# Patient Record
Sex: FEMALE | Race: WHITE | Employment: OTHER | URBAN - NONMETROPOLITAN AREA
[De-identification: names, ages, dates, MRNs, and addresses within clinical notes are randomized per-mention and may not be internally consistent; named-entity substitution may affect disease eponyms.]

---

## 2017-01-13 DIAGNOSIS — E03.9 ACQUIRED HYPOTHYROIDISM: ICD-10-CM

## 2017-01-13 RX ORDER — LEVOTHYROXINE SODIUM 112 UG/1
TABLET ORAL
Qty: 30 TABLET | Refills: 1 | Status: SHIPPED | OUTPATIENT
Start: 2017-01-13 | End: 2017-01-19 | Stop reason: DRUGHIGH

## 2017-01-19 DIAGNOSIS — E03.9 ACQUIRED HYPOTHYROIDISM: Primary | ICD-10-CM

## 2017-01-19 DIAGNOSIS — E03.9 HYPOTHYROIDISM, UNSPECIFIED TYPE: Primary | ICD-10-CM

## 2017-01-19 RX ORDER — LEVOTHYROXINE SODIUM 0.12 MG/1
125 TABLET ORAL DAILY
Qty: 30 TABLET | Refills: 3 | Status: SHIPPED | OUTPATIENT
Start: 2017-01-19 | End: 2017-05-13 | Stop reason: SDUPTHER

## 2017-01-23 ENCOUNTER — OFFICE VISIT (OUTPATIENT)
Dept: FAMILY MEDICINE CLINIC | Age: 67
End: 2017-01-23

## 2017-01-23 VITALS
HEIGHT: 61 IN | HEART RATE: 80 BPM | BODY MASS INDEX: 34.55 KG/M2 | SYSTOLIC BLOOD PRESSURE: 110 MMHG | WEIGHT: 183 LBS | DIASTOLIC BLOOD PRESSURE: 68 MMHG | RESPIRATION RATE: 18 BRPM

## 2017-01-23 DIAGNOSIS — I10 ESSENTIAL HYPERTENSION: ICD-10-CM

## 2017-01-23 DIAGNOSIS — R22.0 SWELLING OF BOTH LIPS: ICD-10-CM

## 2017-01-23 DIAGNOSIS — R73.01 IMPAIRED FASTING GLUCOSE: ICD-10-CM

## 2017-01-23 DIAGNOSIS — E78.00 PURE HYPERCHOLESTEROLEMIA: ICD-10-CM

## 2017-01-23 DIAGNOSIS — R05.9 COUGH: ICD-10-CM

## 2017-01-23 DIAGNOSIS — Z13.9 SCREENING: ICD-10-CM

## 2017-01-23 DIAGNOSIS — Z23 NEED FOR VACCINATION: Primary | ICD-10-CM

## 2017-01-23 DIAGNOSIS — J30.9 ALLERGIC RHINITIS, UNSPECIFIED ALLERGIC RHINITIS TRIGGER, UNSPECIFIED RHINITIS SEASONALITY: ICD-10-CM

## 2017-01-23 DIAGNOSIS — J01.41 ACUTE RECURRENT PANSINUSITIS: ICD-10-CM

## 2017-01-23 PROCEDURE — G8598 ASA/ANTIPLAT THER USED: HCPCS | Performed by: PHYSICIAN ASSISTANT

## 2017-01-23 PROCEDURE — 1090F PRES/ABSN URINE INCON ASSESS: CPT | Performed by: PHYSICIAN ASSISTANT

## 2017-01-23 PROCEDURE — G0008 ADMIN INFLUENZA VIRUS VAC: HCPCS | Performed by: PHYSICIAN ASSISTANT

## 2017-01-23 PROCEDURE — G8419 CALC BMI OUT NRM PARAM NOF/U: HCPCS | Performed by: PHYSICIAN ASSISTANT

## 2017-01-23 PROCEDURE — G8399 PT W/DXA RESULTS DOCUMENT: HCPCS | Performed by: PHYSICIAN ASSISTANT

## 2017-01-23 PROCEDURE — 1123F ACP DISCUSS/DSCN MKR DOCD: CPT | Performed by: PHYSICIAN ASSISTANT

## 2017-01-23 PROCEDURE — 3014F SCREEN MAMMO DOC REV: CPT | Performed by: PHYSICIAN ASSISTANT

## 2017-01-23 PROCEDURE — 90686 IIV4 VACC NO PRSV 0.5 ML IM: CPT | Performed by: PHYSICIAN ASSISTANT

## 2017-01-23 PROCEDURE — 4040F PNEUMOC VAC/ADMIN/RCVD: CPT | Performed by: PHYSICIAN ASSISTANT

## 2017-01-23 PROCEDURE — 3017F COLORECTAL CA SCREEN DOC REV: CPT | Performed by: PHYSICIAN ASSISTANT

## 2017-01-23 PROCEDURE — 99214 OFFICE O/P EST MOD 30 MIN: CPT | Performed by: PHYSICIAN ASSISTANT

## 2017-01-23 PROCEDURE — 1036F TOBACCO NON-USER: CPT | Performed by: PHYSICIAN ASSISTANT

## 2017-01-23 PROCEDURE — G8427 DOCREV CUR MEDS BY ELIG CLIN: HCPCS | Performed by: PHYSICIAN ASSISTANT

## 2017-01-23 PROCEDURE — G8484 FLU IMMUNIZE NO ADMIN: HCPCS | Performed by: PHYSICIAN ASSISTANT

## 2017-01-23 RX ORDER — AZITHROMYCIN 250 MG/1
TABLET, FILM COATED ORAL
Qty: 1 PACKET | Refills: 0 | Status: SHIPPED | OUTPATIENT
Start: 2017-01-23 | End: 2017-02-02

## 2017-01-23 ASSESSMENT — ENCOUNTER SYMPTOMS
SORE THROAT: 0
SINUS PRESSURE: 0
SHORTNESS OF BREATH: 0
RHINORRHEA: 1
CHEST TIGHTNESS: 0
NAUSEA: 0
WHEEZING: 0
DIARRHEA: 0
VOMITING: 0
COUGH: 1

## 2017-01-23 ASSESSMENT — PATIENT HEALTH QUESTIONNAIRE - PHQ9
SUM OF ALL RESPONSES TO PHQ9 QUESTIONS 1 & 2: 0
1. LITTLE INTEREST OR PLEASURE IN DOING THINGS: 0
SUM OF ALL RESPONSES TO PHQ QUESTIONS 1-9: 0
2. FEELING DOWN, DEPRESSED OR HOPELESS: 0

## 2017-01-25 DIAGNOSIS — Z23 NEED FOR VACCINATION: Primary | ICD-10-CM

## 2017-02-22 ENCOUNTER — HOSPITAL ENCOUNTER (OUTPATIENT)
Age: 67
Setting detail: SPECIMEN
Discharge: HOME | End: 2017-02-22
Payer: MEDICARE

## 2017-02-22 ENCOUNTER — OFFICE VISIT (OUTPATIENT)
Dept: ALLERGY | Age: 67
End: 2017-02-22

## 2017-02-22 VITALS
HEIGHT: 61 IN | WEIGHT: 184 LBS | BODY MASS INDEX: 34.74 KG/M2 | DIASTOLIC BLOOD PRESSURE: 72 MMHG | SYSTOLIC BLOOD PRESSURE: 132 MMHG | HEART RATE: 82 BPM

## 2017-02-22 DIAGNOSIS — R05.9 COUGH: Primary | ICD-10-CM

## 2017-02-22 DIAGNOSIS — R06.83 SNORING: ICD-10-CM

## 2017-02-22 DIAGNOSIS — K13.79 ELONGATED UVULA, ACQUIRED: ICD-10-CM

## 2017-02-22 DIAGNOSIS — R09.82 POST-NASAL DRIP: ICD-10-CM

## 2017-02-22 DIAGNOSIS — G47.8 UNREFRESHED BY SLEEP: ICD-10-CM

## 2017-02-22 DIAGNOSIS — G47.30 SLEEP APNEA, UNSPECIFIED TYPE: ICD-10-CM

## 2017-02-22 DIAGNOSIS — R09.89 THROAT CLEARING: ICD-10-CM

## 2017-02-22 PROCEDURE — 3017F COLORECTAL CA SCREEN DOC REV: CPT | Performed by: NURSE PRACTITIONER

## 2017-02-22 PROCEDURE — G8417 CALC BMI ABV UP PARAM F/U: HCPCS | Performed by: NURSE PRACTITIONER

## 2017-02-22 PROCEDURE — 1090F PRES/ABSN URINE INCON ASSESS: CPT | Performed by: NURSE PRACTITIONER

## 2017-02-22 PROCEDURE — 1036F TOBACCO NON-USER: CPT | Performed by: NURSE PRACTITIONER

## 2017-02-22 PROCEDURE — G8598 ASA/ANTIPLAT THER USED: HCPCS | Performed by: NURSE PRACTITIONER

## 2017-02-22 PROCEDURE — 4040F PNEUMOC VAC/ADMIN/RCVD: CPT | Performed by: NURSE PRACTITIONER

## 2017-02-22 PROCEDURE — 99215 OFFICE O/P EST HI 40 MIN: CPT | Performed by: NURSE PRACTITIONER

## 2017-02-22 PROCEDURE — G8427 DOCREV CUR MEDS BY ELIG CLIN: HCPCS | Performed by: NURSE PRACTITIONER

## 2017-02-22 PROCEDURE — G8484 FLU IMMUNIZE NO ADMIN: HCPCS | Performed by: NURSE PRACTITIONER

## 2017-02-22 PROCEDURE — 3014F SCREEN MAMMO DOC REV: CPT | Performed by: NURSE PRACTITIONER

## 2017-02-22 RX ORDER — FLUTICASONE PROPIONATE 50 MCG
SPRAY, SUSPENSION (ML) NASAL
Qty: 1 BOTTLE | Refills: 11 | Status: SHIPPED | OUTPATIENT
Start: 2017-02-22

## 2017-02-24 LAB
2000687N OAK TREE IGE: <0.34 KU/L (ref 0–0.34)
ALLERGEN BERMUDA GRASS IGE: <0.34 KU/L (ref 0–0.34)
ALLERGEN BIRCH IGE: <0.34 KU/L (ref 0–0.34)
ALLERGEN COW MILK IGE: <0.34 KU/L (ref 0–0.34)
ALLERGEN DOG DANDER IGE: <0.34 KU/L (ref 0–0.34)
ALLERGEN GERMAN COCKROACH IGE: <0.34 KU/L (ref 0–0.34)
ALLERGEN HORMODENDRUM IGE: <0.34 KUL/L (ref 0–0.34)
ALLERGEN MOUSE EPITHELIA IGE: <0.34 KU/L (ref 0–0.34)
ALLERGEN PEANUT (F13) IGE: <0.34 KU/L (ref 0–0.34)
ALLERGEN PECAN TREE IGE: <0.34 KU/L (ref 0–0.34)
ALLERGEN PIGWEED ROUGH IGE: <0.34 KU/L (ref 0–0.34)
ALLERGEN SHEEP SORREL (W18) IGE: <0.34 KU/L (ref 0–0.34)
ALLERGEN TREE SYCAMORE: <0.34 KU/L (ref 0–0.34)
ALLERGEN WALNUT TREE IGE: <0.34 KU/L (ref 0–0.34)
ALLERGEN WHITE MULBERRY TREE, IGE: <0.34 KU/L (ref 0–0.34)
ALLERGEN, TREE, WHITE ASH IGE: <0.34 KU/L (ref 0–0.34)
ALTERNARIA ALTERNATA: <0.34 KU/L (ref 0–0.34)
ASPERGILLUS FUMIGATUS: <0.34 KU/L (ref 0–0.34)
CAT DANDER ANTIBODY: <0.34 KU/L (ref 0–0.34)
COTTONWOOD TREE: <0.34 KU/L (ref 0–0.34)
D. FARINAE: <0.34 KU/L (ref 0–0.34)
D. PTERONYSSINUS: <0.34 KU/L (ref 0–0.34)
ELM TREE: <0.34 KU/L (ref 0–0.34)
IGE: 26 IU/ML
MAPLE/BOXELDER TREE: <0.34 KU/L (ref 0–0.34)
MOUNTAIN CEDAR TREE: 1.27 KU/L (ref 0–0.34)
MUCOR RACEMOSUS: <0.34 KU/L (ref 0–0.34)
P. NOTATUM: <0.34 KU/L (ref 0–0.34)
RUSSIAN THISTLE: <0.34 KU/L (ref 0–0.34)
SHORT RAGWD(A ARTEMIS.) IGE: <0.34 KU/L (ref 0–0.34)
TIMOTHY GRASS: <0.34 KU/L (ref 0–0.34)

## 2017-03-14 DIAGNOSIS — E03.9 ACQUIRED HYPOTHYROIDISM: ICD-10-CM

## 2017-03-14 RX ORDER — LEVOTHYROXINE SODIUM 112 UG/1
TABLET ORAL
Qty: 30 TABLET | Refills: 5 | Status: SHIPPED | OUTPATIENT
Start: 2017-03-14 | End: 2017-04-12 | Stop reason: DRUGHIGH

## 2017-03-15 DIAGNOSIS — E03.9 HYPOTHYROIDISM, UNSPECIFIED TYPE: Primary | ICD-10-CM

## 2017-03-21 ENCOUNTER — HOSPITAL ENCOUNTER (OUTPATIENT)
Dept: SLEEP CENTER | Age: 67
Discharge: HOME | End: 2017-03-21
Payer: MEDICARE

## 2017-03-21 PROCEDURE — 95810 POLYSOM 6/> YRS 4/> PARAM: CPT

## 2017-03-22 VITALS
WEIGHT: 181 LBS | HEART RATE: 76 BPM | BODY MASS INDEX: 34.17 KG/M2 | RESPIRATION RATE: 16 BRPM | TEMPERATURE: 97.1 F | DIASTOLIC BLOOD PRESSURE: 83 MMHG | HEIGHT: 61 IN | SYSTOLIC BLOOD PRESSURE: 127 MMHG

## 2017-03-24 DIAGNOSIS — R06.83 SNORING: ICD-10-CM

## 2017-03-24 DIAGNOSIS — G47.8 UNREFRESHED BY SLEEP: ICD-10-CM

## 2017-03-24 DIAGNOSIS — G47.33 OBSTRUCTIVE SLEEP APNEA (ADULT) (PEDIATRIC): Primary | ICD-10-CM

## 2017-03-24 DIAGNOSIS — G47.30 SLEEP APNEA, UNSPECIFIED TYPE: ICD-10-CM

## 2017-04-12 ENCOUNTER — OFFICE VISIT (OUTPATIENT)
Dept: ALLERGY | Age: 67
End: 2017-04-12
Payer: MEDICARE

## 2017-04-12 VITALS
HEART RATE: 76 BPM | WEIGHT: 181 LBS | SYSTOLIC BLOOD PRESSURE: 114 MMHG | RESPIRATION RATE: 18 BRPM | HEIGHT: 61 IN | DIASTOLIC BLOOD PRESSURE: 76 MMHG | BODY MASS INDEX: 34.17 KG/M2

## 2017-04-12 DIAGNOSIS — R09.89 THROAT CLEARING: ICD-10-CM

## 2017-04-12 DIAGNOSIS — G47.33 OBSTRUCTIVE SLEEP APNEA (ADULT) (PEDIATRIC): Primary | ICD-10-CM

## 2017-04-12 DIAGNOSIS — K13.79 ELONGATED UVULA, ACQUIRED: ICD-10-CM

## 2017-04-12 DIAGNOSIS — R05.9 COUGH: ICD-10-CM

## 2017-04-12 DIAGNOSIS — K21.9 LPRD (LARYNGOPHARYNGEAL REFLUX DISEASE): ICD-10-CM

## 2017-04-12 DIAGNOSIS — J30.1 SEASONAL ALLERGIC RHINITIS DUE TO POLLEN: ICD-10-CM

## 2017-04-12 PROCEDURE — 99214 OFFICE O/P EST MOD 30 MIN: CPT | Performed by: NURSE PRACTITIONER

## 2017-04-12 PROCEDURE — 1090F PRES/ABSN URINE INCON ASSESS: CPT | Performed by: NURSE PRACTITIONER

## 2017-04-12 PROCEDURE — 1036F TOBACCO NON-USER: CPT | Performed by: NURSE PRACTITIONER

## 2017-04-12 PROCEDURE — 3014F SCREEN MAMMO DOC REV: CPT | Performed by: NURSE PRACTITIONER

## 2017-04-12 PROCEDURE — 4040F PNEUMOC VAC/ADMIN/RCVD: CPT | Performed by: NURSE PRACTITIONER

## 2017-04-12 PROCEDURE — 1123F ACP DISCUSS/DSCN MKR DOCD: CPT | Performed by: NURSE PRACTITIONER

## 2017-04-12 PROCEDURE — G8598 ASA/ANTIPLAT THER USED: HCPCS | Performed by: NURSE PRACTITIONER

## 2017-04-12 PROCEDURE — G8417 CALC BMI ABV UP PARAM F/U: HCPCS | Performed by: NURSE PRACTITIONER

## 2017-04-12 PROCEDURE — G8427 DOCREV CUR MEDS BY ELIG CLIN: HCPCS | Performed by: NURSE PRACTITIONER

## 2017-04-12 PROCEDURE — G8399 PT W/DXA RESULTS DOCUMENT: HCPCS | Performed by: NURSE PRACTITIONER

## 2017-04-12 PROCEDURE — 3017F COLORECTAL CA SCREEN DOC REV: CPT | Performed by: NURSE PRACTITIONER

## 2017-04-12 RX ORDER — CHOLECALCIFEROL (VITAMIN D3) 125 MCG
5 CAPSULE ORAL NIGHTLY
COMMUNITY
End: 2021-10-27

## 2017-04-19 ENCOUNTER — HOSPITAL ENCOUNTER (OUTPATIENT)
Dept: SLEEP CENTER | Age: 67
Discharge: HOME | End: 2017-04-19
Payer: MEDICARE

## 2017-04-19 DIAGNOSIS — G47.33 OBSTRUCTIVE SLEEP APNEA (ADULT) (PEDIATRIC): ICD-10-CM

## 2017-05-11 ENCOUNTER — OFFICE VISIT (OUTPATIENT)
Dept: NEUROLOGY | Age: 67
End: 2017-05-11
Payer: MEDICARE

## 2017-05-11 VITALS
BODY MASS INDEX: 34.55 KG/M2 | SYSTOLIC BLOOD PRESSURE: 128 MMHG | HEIGHT: 61 IN | HEART RATE: 72 BPM | DIASTOLIC BLOOD PRESSURE: 82 MMHG | WEIGHT: 183 LBS

## 2017-05-11 DIAGNOSIS — H52.4 MYOPIA OF BOTH EYES WITH ASTIGMATISM AND PRESBYOPIA: ICD-10-CM

## 2017-05-11 DIAGNOSIS — I63.81 LACUNAR INFARCTION (HCC): ICD-10-CM

## 2017-05-11 DIAGNOSIS — E78.5 HYPERLIPIDEMIA, UNSPECIFIED HYPERLIPIDEMIA TYPE: ICD-10-CM

## 2017-05-11 DIAGNOSIS — E88.81 INSULIN RESISTANCE: ICD-10-CM

## 2017-05-11 DIAGNOSIS — I63.9 CEREBRAL INFARCTION, UNSPECIFIED MECHANISM (HCC): ICD-10-CM

## 2017-05-11 DIAGNOSIS — G45.9 TRANSIENT CEREBRAL ISCHEMIA, UNSPECIFIED TYPE: ICD-10-CM

## 2017-05-11 DIAGNOSIS — G45.0 VBI (VERTEBROBASILAR INSUFFICIENCY): ICD-10-CM

## 2017-05-11 DIAGNOSIS — G25.81 RLS (RESTLESS LEGS SYNDROME): ICD-10-CM

## 2017-05-11 DIAGNOSIS — R41.3 MEMORY LOSS DUE TO MEDICAL CONDITION: ICD-10-CM

## 2017-05-11 DIAGNOSIS — G57.12 MERALGIA PARESTHETICA, LEFT: ICD-10-CM

## 2017-05-11 DIAGNOSIS — I69.90 LATE EFFECTS OF CVA (CEREBROVASCULAR ACCIDENT): ICD-10-CM

## 2017-05-11 DIAGNOSIS — H52.203 MYOPIA OF BOTH EYES WITH ASTIGMATISM AND PRESBYOPIA: ICD-10-CM

## 2017-05-11 DIAGNOSIS — I63.9 CEREBROVASCULAR ACCIDENT (CVA), UNSPECIFIED MECHANISM (HCC): Primary | ICD-10-CM

## 2017-05-11 DIAGNOSIS — H52.13 MYOPIA OF BOTH EYES WITH ASTIGMATISM AND PRESBYOPIA: ICD-10-CM

## 2017-05-11 DIAGNOSIS — R42 DIZZINESS: ICD-10-CM

## 2017-05-11 PROCEDURE — 4040F PNEUMOC VAC/ADMIN/RCVD: CPT | Performed by: PSYCHIATRY & NEUROLOGY

## 2017-05-11 PROCEDURE — G8598 ASA/ANTIPLAT THER USED: HCPCS | Performed by: PSYCHIATRY & NEUROLOGY

## 2017-05-11 PROCEDURE — 1090F PRES/ABSN URINE INCON ASSESS: CPT | Performed by: PSYCHIATRY & NEUROLOGY

## 2017-05-11 PROCEDURE — 3014F SCREEN MAMMO DOC REV: CPT | Performed by: PSYCHIATRY & NEUROLOGY

## 2017-05-11 PROCEDURE — 1123F ACP DISCUSS/DSCN MKR DOCD: CPT | Performed by: PSYCHIATRY & NEUROLOGY

## 2017-05-11 PROCEDURE — G8427 DOCREV CUR MEDS BY ELIG CLIN: HCPCS | Performed by: PSYCHIATRY & NEUROLOGY

## 2017-05-11 PROCEDURE — G8399 PT W/DXA RESULTS DOCUMENT: HCPCS | Performed by: PSYCHIATRY & NEUROLOGY

## 2017-05-11 PROCEDURE — 3017F COLORECTAL CA SCREEN DOC REV: CPT | Performed by: PSYCHIATRY & NEUROLOGY

## 2017-05-11 PROCEDURE — 99215 OFFICE O/P EST HI 40 MIN: CPT | Performed by: PSYCHIATRY & NEUROLOGY

## 2017-05-11 PROCEDURE — G8417 CALC BMI ABV UP PARAM F/U: HCPCS | Performed by: PSYCHIATRY & NEUROLOGY

## 2017-05-11 PROCEDURE — 1036F TOBACCO NON-USER: CPT | Performed by: PSYCHIATRY & NEUROLOGY

## 2017-05-11 RX ORDER — CLOPIDOGREL BISULFATE 75 MG/1
TABLET ORAL
Qty: 30 TABLET | Refills: 5 | Status: SHIPPED | OUTPATIENT
Start: 2017-05-11 | End: 2017-11-09 | Stop reason: SDUPTHER

## 2017-05-11 RX ORDER — ROPINIROLE 1 MG/1
TABLET, FILM COATED ORAL
Qty: 30 TABLET | Refills: 5 | Status: SHIPPED | OUTPATIENT
Start: 2017-05-11 | End: 2017-11-09 | Stop reason: SDUPTHER

## 2017-05-11 ASSESSMENT — ENCOUNTER SYMPTOMS
PHOTOPHOBIA: 0
CHOKING: 0
WHEEZING: 0
EYE ITCHING: 0
SHORTNESS OF BREATH: 0
EYE REDNESS: 0
CONSTIPATION: 0
BACK PAIN: 0
COLOR CHANGE: 0
TROUBLE SWALLOWING: 0
ABDOMINAL DISTENTION: 0
BLOOD IN STOOL: 0
EYE PAIN: 0
APNEA: 0
EYE DISCHARGE: 0
DIARRHEA: 0
SINUS PRESSURE: 0
CHEST TIGHTNESS: 0
VOICE CHANGE: 0
FACIAL SWELLING: 0

## 2017-05-13 DIAGNOSIS — E03.9 ACQUIRED HYPOTHYROIDISM: ICD-10-CM

## 2017-05-15 RX ORDER — CLOPIDOGREL BISULFATE 75 MG/1
TABLET ORAL
Qty: 30 TABLET | Refills: 0 | OUTPATIENT
Start: 2017-05-15

## 2017-05-15 RX ORDER — ROPINIROLE 1 MG/1
TABLET, FILM COATED ORAL
Qty: 30 TABLET | Refills: 0 | OUTPATIENT
Start: 2017-05-15

## 2017-05-16 RX ORDER — LEVOTHYROXINE SODIUM 0.12 MG/1
TABLET ORAL
Qty: 30 TABLET | Refills: 5 | Status: SHIPPED | OUTPATIENT
Start: 2017-05-16 | End: 2017-11-09 | Stop reason: SDUPTHER

## 2017-06-14 ENCOUNTER — TELEPHONE (OUTPATIENT)
Dept: ALLERGY | Age: 67
End: 2017-06-14

## 2017-06-14 ENCOUNTER — OFFICE VISIT (OUTPATIENT)
Dept: ALLERGY | Age: 67
End: 2017-06-14
Payer: MEDICARE

## 2017-06-14 VITALS
WEIGHT: 181 LBS | SYSTOLIC BLOOD PRESSURE: 116 MMHG | DIASTOLIC BLOOD PRESSURE: 78 MMHG | HEART RATE: 70 BPM | HEIGHT: 61 IN | BODY MASS INDEX: 34.17 KG/M2

## 2017-06-14 DIAGNOSIS — K13.79 ELONGATED UVULA, ACQUIRED: ICD-10-CM

## 2017-06-14 DIAGNOSIS — E03.9 HYPOTHYROIDISM, UNSPECIFIED TYPE: Primary | ICD-10-CM

## 2017-06-14 DIAGNOSIS — G47.33 OBSTRUCTIVE SLEEP APNEA (ADULT) (PEDIATRIC): ICD-10-CM

## 2017-06-14 DIAGNOSIS — J30.1 SEASONAL ALLERGIC RHINITIS DUE TO POLLEN: ICD-10-CM

## 2017-06-14 DIAGNOSIS — K21.9 LPRD (LARYNGOPHARYNGEAL REFLUX DISEASE): Primary | ICD-10-CM

## 2017-06-14 DIAGNOSIS — R09.89 THROAT CLEARING: ICD-10-CM

## 2017-06-14 DIAGNOSIS — R05.9 COUGH: ICD-10-CM

## 2017-06-14 PROCEDURE — G8598 ASA/ANTIPLAT THER USED: HCPCS | Performed by: NURSE PRACTITIONER

## 2017-06-14 PROCEDURE — 3017F COLORECTAL CA SCREEN DOC REV: CPT | Performed by: NURSE PRACTITIONER

## 2017-06-14 PROCEDURE — 1036F TOBACCO NON-USER: CPT | Performed by: NURSE PRACTITIONER

## 2017-06-14 PROCEDURE — 4040F PNEUMOC VAC/ADMIN/RCVD: CPT | Performed by: NURSE PRACTITIONER

## 2017-06-14 PROCEDURE — 1090F PRES/ABSN URINE INCON ASSESS: CPT | Performed by: NURSE PRACTITIONER

## 2017-06-14 PROCEDURE — 99213 OFFICE O/P EST LOW 20 MIN: CPT | Performed by: NURSE PRACTITIONER

## 2017-06-14 PROCEDURE — 1123F ACP DISCUSS/DSCN MKR DOCD: CPT | Performed by: NURSE PRACTITIONER

## 2017-06-14 PROCEDURE — G8427 DOCREV CUR MEDS BY ELIG CLIN: HCPCS | Performed by: NURSE PRACTITIONER

## 2017-06-14 PROCEDURE — G8417 CALC BMI ABV UP PARAM F/U: HCPCS | Performed by: NURSE PRACTITIONER

## 2017-06-14 PROCEDURE — G8399 PT W/DXA RESULTS DOCUMENT: HCPCS | Performed by: NURSE PRACTITIONER

## 2017-06-14 PROCEDURE — 3014F SCREEN MAMMO DOC REV: CPT | Performed by: NURSE PRACTITIONER

## 2017-06-27 ENCOUNTER — OFFICE VISIT (OUTPATIENT)
Dept: CARDIOLOGY | Age: 67
End: 2017-06-27
Payer: MEDICARE

## 2017-06-27 VITALS
DIASTOLIC BLOOD PRESSURE: 60 MMHG | HEIGHT: 61 IN | HEART RATE: 64 BPM | SYSTOLIC BLOOD PRESSURE: 104 MMHG | WEIGHT: 178 LBS | BODY MASS INDEX: 33.61 KG/M2

## 2017-06-27 DIAGNOSIS — G45.9 TRANSIENT CEREBRAL ISCHEMIA, UNSPECIFIED TYPE: ICD-10-CM

## 2017-06-27 DIAGNOSIS — I10 ESSENTIAL HYPERTENSION: ICD-10-CM

## 2017-06-27 DIAGNOSIS — E78.5 HYPERLIPIDEMIA, UNSPECIFIED HYPERLIPIDEMIA TYPE: Primary | ICD-10-CM

## 2017-06-27 PROCEDURE — G8417 CALC BMI ABV UP PARAM F/U: HCPCS | Performed by: INTERNAL MEDICINE

## 2017-06-27 PROCEDURE — 3014F SCREEN MAMMO DOC REV: CPT | Performed by: INTERNAL MEDICINE

## 2017-06-27 PROCEDURE — 99214 OFFICE O/P EST MOD 30 MIN: CPT | Performed by: INTERNAL MEDICINE

## 2017-06-27 PROCEDURE — 1036F TOBACCO NON-USER: CPT | Performed by: INTERNAL MEDICINE

## 2017-06-27 PROCEDURE — 1123F ACP DISCUSS/DSCN MKR DOCD: CPT | Performed by: INTERNAL MEDICINE

## 2017-06-27 PROCEDURE — 1090F PRES/ABSN URINE INCON ASSESS: CPT | Performed by: INTERNAL MEDICINE

## 2017-06-27 PROCEDURE — G8399 PT W/DXA RESULTS DOCUMENT: HCPCS | Performed by: INTERNAL MEDICINE

## 2017-06-27 PROCEDURE — G8598 ASA/ANTIPLAT THER USED: HCPCS | Performed by: INTERNAL MEDICINE

## 2017-06-27 PROCEDURE — 4040F PNEUMOC VAC/ADMIN/RCVD: CPT | Performed by: INTERNAL MEDICINE

## 2017-06-27 PROCEDURE — 93000 ELECTROCARDIOGRAM COMPLETE: CPT | Performed by: INTERNAL MEDICINE

## 2017-06-27 PROCEDURE — 3017F COLORECTAL CA SCREEN DOC REV: CPT | Performed by: INTERNAL MEDICINE

## 2017-06-27 PROCEDURE — G8427 DOCREV CUR MEDS BY ELIG CLIN: HCPCS | Performed by: INTERNAL MEDICINE

## 2017-07-24 ENCOUNTER — OFFICE VISIT (OUTPATIENT)
Dept: FAMILY MEDICINE CLINIC | Age: 67
End: 2017-07-24
Payer: MEDICARE

## 2017-07-24 VITALS
HEIGHT: 61 IN | SYSTOLIC BLOOD PRESSURE: 105 MMHG | WEIGHT: 180 LBS | OXYGEN SATURATION: 98 % | BODY MASS INDEX: 33.99 KG/M2 | DIASTOLIC BLOOD PRESSURE: 68 MMHG | HEART RATE: 82 BPM

## 2017-07-24 DIAGNOSIS — E78.1 PURE HYPERGLYCERIDEMIA: ICD-10-CM

## 2017-07-24 DIAGNOSIS — G25.81 RESTLESS LEG SYNDROME: ICD-10-CM

## 2017-07-24 DIAGNOSIS — E03.9 ACQUIRED HYPOTHYROIDISM: ICD-10-CM

## 2017-07-24 DIAGNOSIS — T78.3XXA ANGIOEDEMA, INITIAL ENCOUNTER: Primary | ICD-10-CM

## 2017-07-24 DIAGNOSIS — Z86.73 HISTORY OF TIA (TRANSIENT ISCHEMIC ATTACK): ICD-10-CM

## 2017-07-24 PROCEDURE — 3014F SCREEN MAMMO DOC REV: CPT | Performed by: PHYSICIAN ASSISTANT

## 2017-07-24 PROCEDURE — 3017F COLORECTAL CA SCREEN DOC REV: CPT | Performed by: PHYSICIAN ASSISTANT

## 2017-07-24 PROCEDURE — 99214 OFFICE O/P EST MOD 30 MIN: CPT | Performed by: PHYSICIAN ASSISTANT

## 2017-07-24 PROCEDURE — 1036F TOBACCO NON-USER: CPT | Performed by: PHYSICIAN ASSISTANT

## 2017-07-24 PROCEDURE — 4040F PNEUMOC VAC/ADMIN/RCVD: CPT | Performed by: PHYSICIAN ASSISTANT

## 2017-07-24 PROCEDURE — G8427 DOCREV CUR MEDS BY ELIG CLIN: HCPCS | Performed by: PHYSICIAN ASSISTANT

## 2017-07-24 PROCEDURE — G8399 PT W/DXA RESULTS DOCUMENT: HCPCS | Performed by: PHYSICIAN ASSISTANT

## 2017-07-24 PROCEDURE — G8598 ASA/ANTIPLAT THER USED: HCPCS | Performed by: PHYSICIAN ASSISTANT

## 2017-07-24 PROCEDURE — G8417 CALC BMI ABV UP PARAM F/U: HCPCS | Performed by: PHYSICIAN ASSISTANT

## 2017-07-24 PROCEDURE — 1123F ACP DISCUSS/DSCN MKR DOCD: CPT | Performed by: PHYSICIAN ASSISTANT

## 2017-07-24 PROCEDURE — 1090F PRES/ABSN URINE INCON ASSESS: CPT | Performed by: PHYSICIAN ASSISTANT

## 2017-07-24 RX ORDER — RANITIDINE 300 MG/1
300 TABLET ORAL NIGHTLY
Qty: 30 TABLET | Refills: 3 | Status: SHIPPED | OUTPATIENT
Start: 2017-07-24 | End: 2017-12-13

## 2017-07-24 RX ORDER — CETIRIZINE HYDROCHLORIDE 10 MG/1
10 TABLET ORAL NIGHTLY PRN
Qty: 30 TABLET | Refills: 3 | Status: SHIPPED | OUTPATIENT
Start: 2017-07-24 | End: 2017-12-13 | Stop reason: ALTCHOICE

## 2017-07-24 ASSESSMENT — ENCOUNTER SYMPTOMS
DIARRHEA: 0
CONSTIPATION: 0
NAUSEA: 0
RESPIRATORY NEGATIVE: 1
VOMITING: 0

## 2017-09-11 ENCOUNTER — OFFICE VISIT (OUTPATIENT)
Dept: OPTOMETRY | Age: 67
End: 2017-09-11
Payer: MEDICARE

## 2017-09-11 DIAGNOSIS — H52.13 MYOPIA OF BOTH EYES WITH ASTIGMATISM AND PRESBYOPIA: ICD-10-CM

## 2017-09-11 DIAGNOSIS — H52.4 MYOPIA OF BOTH EYES WITH ASTIGMATISM AND PRESBYOPIA: ICD-10-CM

## 2017-09-11 DIAGNOSIS — H53.8 BLURRED VISION, BILATERAL: ICD-10-CM

## 2017-09-11 DIAGNOSIS — H25.13 NUCLEAR SCLEROSIS OF BOTH EYES: Primary | ICD-10-CM

## 2017-09-11 DIAGNOSIS — H52.203 MYOPIA OF BOTH EYES WITH ASTIGMATISM AND PRESBYOPIA: ICD-10-CM

## 2017-09-11 PROCEDURE — G8417 CALC BMI ABV UP PARAM F/U: HCPCS | Performed by: OPTOMETRIST

## 2017-09-11 PROCEDURE — G8399 PT W/DXA RESULTS DOCUMENT: HCPCS | Performed by: OPTOMETRIST

## 2017-09-11 PROCEDURE — 4040F PNEUMOC VAC/ADMIN/RCVD: CPT | Performed by: OPTOMETRIST

## 2017-09-11 PROCEDURE — 1036F TOBACCO NON-USER: CPT | Performed by: OPTOMETRIST

## 2017-09-11 PROCEDURE — G8427 DOCREV CUR MEDS BY ELIG CLIN: HCPCS | Performed by: OPTOMETRIST

## 2017-09-11 PROCEDURE — 99213 OFFICE O/P EST LOW 20 MIN: CPT | Performed by: OPTOMETRIST

## 2017-09-11 PROCEDURE — 3014F SCREEN MAMMO DOC REV: CPT | Performed by: OPTOMETRIST

## 2017-09-11 PROCEDURE — 3017F COLORECTAL CA SCREEN DOC REV: CPT | Performed by: OPTOMETRIST

## 2017-09-11 PROCEDURE — G8598 ASA/ANTIPLAT THER USED: HCPCS | Performed by: OPTOMETRIST

## 2017-09-11 PROCEDURE — 1123F ACP DISCUSS/DSCN MKR DOCD: CPT | Performed by: OPTOMETRIST

## 2017-09-11 PROCEDURE — 1090F PRES/ABSN URINE INCON ASSESS: CPT | Performed by: OPTOMETRIST

## 2017-09-11 RX ORDER — BENOXINATE HCL/FLUORESCEIN SOD 0.4%-0.25%
1 DROPS OPHTHALMIC (EYE) ONCE
Status: COMPLETED | OUTPATIENT
Start: 2017-09-11 | End: 2017-09-11

## 2017-09-11 RX ADMIN — Medication 1 DROP: at 09:29

## 2017-09-11 ASSESSMENT — KERATOMETRY
OS_AXISANGLE_DEGREES: 090
OS_AXISANGLE2_DEGREES: 000
OS_K2POWER_DIOPTERS: 44.25
OS_K1POWER_DIOPTERS: 44.25
OD_AXISANGLE_DEGREES: 090
OD_AXISANGLE2_DEGREES: 000
OD_K1POWER_DIOPTERS: 44.25
OD_K2POWER_DIOPTERS: 44.25

## 2017-09-11 ASSESSMENT — SLIT LAMP EXAM - LIDS
COMMENTS: NORMAL
COMMENTS: NORMAL

## 2017-09-11 ASSESSMENT — REFRACTION_WEARINGRX
OD_SPHERE: PLANO
OD_ADD: +2.00
OS_SPHERE: PLANO
OS_ADD: +2.00

## 2017-09-11 ASSESSMENT — REFRACTION_CURRENTRX
OS_BRAND: COOPERVISION: BIOFINITY
OD_BASECURVE: 8.6
OD_SPHERE: -4.75
OS_BASECURVE: 8.6
OS_SPHERE: -5.00
OD_BRAND: COOPERVISION: BIOFINITY

## 2017-09-11 ASSESSMENT — REFRACTION_MANIFEST
OS_CYLINDER: -1.00
OD_AXIS: 090
OD_CYLINDER: -0.75
OD_SPHERE: -5.00
OS_AXIS: 095
OS_SPHERE: -4.50

## 2017-09-11 ASSESSMENT — VISUAL ACUITY
CORRECTION_TYPE: GLASSES, CONTACTS
OD_CC: 20/25 OU
OS_CC: 20/30
METHOD: SNELLEN - LINEAR

## 2017-09-11 ASSESSMENT — TONOMETRY
IOP_METHOD: APPLANATION W FLURESS DROP
OS_IOP_MMHG: 19
OD_IOP_MMHG: 19

## 2017-10-11 RX ORDER — ATORVASTATIN CALCIUM 10 MG/1
TABLET, FILM COATED ORAL
Qty: 30 TABLET | Refills: 11 | Status: SHIPPED | OUTPATIENT
Start: 2017-10-11 | End: 2018-09-12 | Stop reason: SDUPTHER

## 2017-11-09 DIAGNOSIS — E03.9 ACQUIRED HYPOTHYROIDISM: ICD-10-CM

## 2017-11-09 RX ORDER — LEVOTHYROXINE SODIUM 0.12 MG/1
TABLET ORAL
Qty: 30 TABLET | Refills: 5 | Status: SHIPPED | OUTPATIENT
Start: 2017-11-09 | End: 2018-04-18 | Stop reason: SDUPTHER

## 2017-11-09 RX ORDER — CLOPIDOGREL BISULFATE 75 MG/1
TABLET ORAL
Qty: 30 TABLET | Refills: 0 | Status: SHIPPED | OUTPATIENT
Start: 2017-11-09 | End: 2017-12-09 | Stop reason: SDUPTHER

## 2017-11-09 RX ORDER — ROPINIROLE 1 MG/1
TABLET, FILM COATED ORAL
Qty: 30 TABLET | Refills: 0 | Status: SHIPPED | OUTPATIENT
Start: 2017-11-09 | End: 2017-12-09 | Stop reason: SDUPTHER

## 2017-11-14 ENCOUNTER — NURSE ONLY (OUTPATIENT)
Dept: LAB | Age: 67
End: 2017-11-14
Payer: MEDICARE

## 2017-11-14 ENCOUNTER — OFFICE VISIT (OUTPATIENT)
Dept: NEUROLOGY | Age: 67
End: 2017-11-14
Payer: MEDICARE

## 2017-11-14 VITALS
OXYGEN SATURATION: 96 % | HEIGHT: 61 IN | SYSTOLIC BLOOD PRESSURE: 124 MMHG | BODY MASS INDEX: 33.96 KG/M2 | WEIGHT: 179.9 LBS | HEART RATE: 69 BPM | DIASTOLIC BLOOD PRESSURE: 74 MMHG

## 2017-11-14 DIAGNOSIS — E78.5 HYPERLIPIDEMIA, UNSPECIFIED HYPERLIPIDEMIA TYPE: ICD-10-CM

## 2017-11-14 DIAGNOSIS — E03.9 HYPOTHYROIDISM, UNSPECIFIED TYPE: ICD-10-CM

## 2017-11-14 DIAGNOSIS — R42 DIZZINESS: Primary | ICD-10-CM

## 2017-11-14 DIAGNOSIS — I69.90 LATE EFFECTS OF CVA (CEREBROVASCULAR ACCIDENT): ICD-10-CM

## 2017-11-14 DIAGNOSIS — Z23 NEED FOR VACCINATION: Primary | ICD-10-CM

## 2017-11-14 DIAGNOSIS — I63.00 CEREBROVASCULAR ACCIDENT (CVA) DUE TO THROMBOSIS OF PRECEREBRAL ARTERY (HCC): ICD-10-CM

## 2017-11-14 DIAGNOSIS — G45.8 OTHER SPECIFIED TRANSIENT CEREBRAL ISCHEMIAS: ICD-10-CM

## 2017-11-14 DIAGNOSIS — R41.3 MEMORY LOSS DUE TO MEDICAL CONDITION: ICD-10-CM

## 2017-11-14 DIAGNOSIS — I63.81 LACUNAR INFARCTION (HCC): ICD-10-CM

## 2017-11-14 DIAGNOSIS — G25.81 RLS (RESTLESS LEGS SYNDROME): ICD-10-CM

## 2017-11-14 DIAGNOSIS — G57.12 MERALGIA PARESTHETICA OF LEFT SIDE: ICD-10-CM

## 2017-11-14 PROCEDURE — 4040F PNEUMOC VAC/ADMIN/RCVD: CPT | Performed by: PSYCHIATRY & NEUROLOGY

## 2017-11-14 PROCEDURE — G8399 PT W/DXA RESULTS DOCUMENT: HCPCS | Performed by: PSYCHIATRY & NEUROLOGY

## 2017-11-14 PROCEDURE — G8484 FLU IMMUNIZE NO ADMIN: HCPCS | Performed by: PSYCHIATRY & NEUROLOGY

## 2017-11-14 PROCEDURE — 1036F TOBACCO NON-USER: CPT | Performed by: PSYCHIATRY & NEUROLOGY

## 2017-11-14 PROCEDURE — 1123F ACP DISCUSS/DSCN MKR DOCD: CPT | Performed by: PSYCHIATRY & NEUROLOGY

## 2017-11-14 PROCEDURE — 1090F PRES/ABSN URINE INCON ASSESS: CPT | Performed by: PSYCHIATRY & NEUROLOGY

## 2017-11-14 PROCEDURE — G8427 DOCREV CUR MEDS BY ELIG CLIN: HCPCS | Performed by: PSYCHIATRY & NEUROLOGY

## 2017-11-14 PROCEDURE — 90662 IIV NO PRSV INCREASED AG IM: CPT | Performed by: PHYSICIAN ASSISTANT

## 2017-11-14 PROCEDURE — G8417 CALC BMI ABV UP PARAM F/U: HCPCS | Performed by: PSYCHIATRY & NEUROLOGY

## 2017-11-14 PROCEDURE — 99215 OFFICE O/P EST HI 40 MIN: CPT | Performed by: PSYCHIATRY & NEUROLOGY

## 2017-11-14 PROCEDURE — 3014F SCREEN MAMMO DOC REV: CPT | Performed by: PSYCHIATRY & NEUROLOGY

## 2017-11-14 PROCEDURE — 3017F COLORECTAL CA SCREEN DOC REV: CPT | Performed by: PSYCHIATRY & NEUROLOGY

## 2017-11-14 PROCEDURE — G0008 ADMIN INFLUENZA VIRUS VAC: HCPCS | Performed by: PHYSICIAN ASSISTANT

## 2017-11-14 PROCEDURE — G8598 ASA/ANTIPLAT THER USED: HCPCS | Performed by: PSYCHIATRY & NEUROLOGY

## 2017-11-14 ASSESSMENT — ENCOUNTER SYMPTOMS
EYE DISCHARGE: 0
EYE PAIN: 0
VOMITING: 0
CONSTIPATION: 0
ABDOMINAL PAIN: 0
APNEA: 0
NAUSEA: 0
BOWEL INCONTINENCE: 0
EYE REDNESS: 0
CHEST TIGHTNESS: 0
COLOR CHANGE: 0
TROUBLE SWALLOWING: 0
BLOOD IN STOOL: 0
BACK PAIN: 0
WHEEZING: 0
FACIAL SWELLING: 0
SWOLLEN GLANDS: 0
VOICE CHANGE: 0
SINUS PRESSURE: 0
COUGH: 0
EYE ITCHING: 0
PHOTOPHOBIA: 0
VISUAL CHANGE: 0
ABDOMINAL DISTENTION: 0
SHORTNESS OF BREATH: 0
DIARRHEA: 0
CHANGE IN BOWEL HABIT: 0
SORE THROAT: 0
CHOKING: 0

## 2017-11-14 NOTE — PROGRESS NOTES
Have you had an allergic reaction to the flu (influenza) shot? no  Are you allergic to eggs or any component of the flu vaccine? no  Do you have a history of Guillain-Geneva Syndrome (GBS), which is paralysis after receiving the flu vaccine? no  Are you feeling well today? yes  Flu vaccine given as ordered. Patient tolerated it well. No questions re: VIS information.

## 2017-11-14 NOTE — PROGRESS NOTES
Subjective:      Patient ID: Riley Brown is a 69 y.o. RIGHT   HANDED female. Cerebrovascular Accident   This is a chronic problem. Episode onset: TIA  IN  2008   AND  CVA  IN  2014. The problem occurs rarely. The problem has been resolved. Pertinent negatives include no abdominal pain, anorexia, arthralgias, change in bowel habit, chest pain, chills, congestion, coughing, diaphoresis, fatigue, fever, headaches, joint swelling, myalgias, nausea, neck pain, numbness, rash, sore throat, swollen glands, urinary symptoms, vertigo, visual change, vomiting or weakness. Nothing aggravates the symptoms. Treatments tried: ASA,  PLAVIX. The treatment provided significant relief. Neurologic Problem   The patient's pertinent negatives include no altered mental status, clumsiness, focal sensory loss, focal weakness, loss of balance, memory loss, near-syncope, slurred speech, syncope, visual change or weakness. Primary symptoms comment: MILD SHORT  TERM  MEMORY  PROBLEMS  . This is a chronic problem. The current episode started more than 1 year ago. The neurological problem developed insidiously. The problem is unchanged. There was no focality noted. Pertinent negatives include no abdominal pain, auditory change, aura, back pain, bladder incontinence, bowel incontinence, chest pain, confusion, diaphoresis, dizziness, fatigue, fever, headaches, light-headedness, nausea, neck pain, palpitations, shortness of breath, vertigo or vomiting. Past treatments include nothing. The treatment provided no relief. Her past medical history is significant for a CVA. There is no history of a bleeding disorder, a clotting disorder, dementia, head trauma, liver disease, mood changes or seizures. History obtained from  The patient   and other  available medical records were  Also  reviewed. 1)    TIA    WITH  MEMORY  LOSS  AND  CONFUSION     IN      2008    2)   STROKE    IN   January 2014.     HAD   INFARCT  IN MEDIAL absent              focal  Weakness     absent             Tremors         absent              Sleep  Problems     absent             History  Of   Recent   Head  Injury     absent             History  Of   Recent  TIA     absent             History  Of   Recent    Stroke     absent             Neck  Pain and  Neck muscle  Spasms  absent             Radiating  down   And   Weakness           absent            Lower back   Pain  And     Spasms  absent            Radiating    Down   And   Weakness          absent                H/O   FALLS        absent               History  Of   Visual  Symptoms    absent                Associated   Diplopia       absent                                                                    Also   Additional   Symptoms   Present    As  Documented    In   The detailed    Review  Of  Systems   And    Please   Refer   To    Them for   Additional  Information. Any components  That are either  Unobtainable  Or  Limited  In   HPI, ROS  And/or PFSH   Are   Due   To   Patient's  Medical  Problems,  Clinical  Condition and/or lack of other  Alternate resources.             RECORDS   REVIEWED:  historical medical records, lab reports, office notes and radiology reports         INFORMATION   REVIEWED:     MEDICAL   HISTORY,     SURGICAL   HISTORY,   MEDICATIONS   LIST,   ALLERGIES AND  DRUG  INTOLERANCES,     FAMILY   HISTORY,  SOCIAL  HISTORY,    PROBLEM  LIST   FOR  PATIENT  CARE   COORDINATION         Past Medical History:   Diagnosis Date    Acute confusion     CVA (cerebral infarction)     Hyperlipidemia     Hypothyroidism     Lac[facility]ar infarction ([facility] [address]     Memory loss due to medical condition     Meralgia paresthetica     left side    Myopia with astigmatism and presbyopia     Perennial allergic rhinitis     RLS (restless legs syndrome)     TIA (transient ischemic attack) 2008, 1/06/2014    with brief confusion    VBI (vertebrobasilar insufficiency) Past Surgical History:   Procedure Laterality Date    CARD[facility]AC [facility]  9/[facility]    [facility]   nl cath    COLONOSCOPY  02/25/2015    mild diverticulosis:  Kari BERNARD    INSERTABLE CARDIAC MONITOR  4/22/16    removed    PRE-MALIGNANT / BENIGN SKIN LESION EXCISION      hemangioma removal on right foot 2nd toe    WISDOM TOOTH EXTRACTION  1981                 Current Outpatient Prescriptions   Medication Sig Dispense Refill    levothyroxine (SYNTHROID) 125 MCG tablet take 1 tablet by mouth once daily 30 tablet 5    rOPINIRole (REQUIP) 1 MG tablet take 1 tablet by mouth at bedtime 30 tablet 0    clopidogrel (PLAVIX) 75 MG tablet take 1 tablet by mouth once daily 30 tablet 0    atorvastatin (LIPITOR) 10 MG tablet take 1 tablet by mouth once daily 30 tablet 11    ranitidine (ZANTAC) 300 MG tablet Take 1 tablet by mouth nightly 30 tablet 3    cetirizine (ZYRTEC ALLERGY) 10 MG tablet Take 1 tablet by mouth nightly as needed for Allergies 30 tablet 3    metoprolol tartrate (LOPRESSOR) 25 MG tablet Take 0.5 tablets by mouth 2 times daily 180 tablet 3    melatonin 5 MG TABS tablet Take 5 mg by mouth nightly      fluticasone (FLONASE) 50 MCG/ACT nasal spray 2 sprays to each nostril once daily. 1 Bottle 11    FIBER PO Take 1 tablet by mouth daily      albuterol (PROVENTIL HFA) 108 (90 BASE) MCG/ACT inhaler Inhale 2 puffs into the lungs every 6 hours as needed for Wheezing or Shortness of Breath. 1 Inhaler 0    Ascorbic Acid (VITAMIN C) 500 MG tablet Take 1,000 mg by mouth daily.  FISH OIL Take by mouth daily       CORAL CALCIUM PO Take 500 mg by mouth daily.  aspirin 81 MG tablet Take 81 mg by mouth daily.  Vitamin D (CHOLECALCIFEROL) 1000 UNITS CAPS capsule Take 3,000 Units by mouth daily       Multiple Vitamin (MULTI-VITAMINS PO) Take 1 tablet by mouth Daily. No current facility-administered medications for this visit.               Allergies   Allergen Reactions    Sulfamethoxazole-Trimethoprim Swelling     lips    Penicillins Rash               Family History   Problem Relation Age of Onset   [facility] Stroke Mother     Other Mother       from pneumonia    Liver Disease Father      due to alcohol    Heart Disease Father      valve disease    Diabetes Father     Thyroid Disease Sister     Cancer Sister      breast cancer older sister    Diabetes Brother     High Cholesterol Sister     Diabetes Brother     Glaucoma Neg Hx     Cataracts Neg Hx              Social History     Social History    Marital status:      Spouse name: N/A    Number of children: N/A    Years of education: N/A     Occupational History    retired       Social History Main Topics    Smoking status: Never Smoker    Smokeless tobacco: Never Used    Alcohol use No    Drug use: No    Sexual activity: Not on file     Other Topics Concern    Not on file     Social History Narrative    No narrative on file       Vitals:    17 0936   BP: 124/74   Pulse: 69   SpO2: 96%         Wt Readings from Last 3 Encounters:   17 179 lb 14.3 oz (81.6 kg)   17 180 lb (81.6 kg)   17 178 lb (80.7 kg)         BP Readings from Last 3 Encounters:   17 124/74   17 105/68   17 104/60       Hematology and Coagulation  Lab Results   Component Value Date    WBC 7.8 01/15/2016    RBC 4.97 01/15/2016    HGB 14.3 01/15/2016    HCT 42.1 01/15/2016    MCV 84.8 01/15/2016    MCH 28.9 01/15/2016    MCHC 34.1 01/15/2016    RDW 13.6 01/15/2016     01/15/2016    MPV 7.8 01/15/2016       Chemistries  Lab Results   Component Value Date     2017    K 4.2 2017     2017    CO2 26 2017    BUN 19 2017    CREATININE 0.65 2017    CALCIUM 9.2 2017    PROT 6.8 2017    LABALBU 3.8 2017    BILITOT 0.70 2017    ALKPHOS 77 2017    AST 22 2017    ALT 19 2017     Lab Results   Component Value Date homicidal plans. She is communicative. She exhibits normal recent memory and normal remote memory. She is attentive. NEUROLOGICAL EXAMINATION :    A) MENTAL STATUS:                   Alert and  oriented  To time, place  And  Person. No Aphasia. No  Dysarthria. Able   To  Follow three  Step commands without   Any  Difficulty. No right  To left confusion. Normal  Speech  And language function. Insight and  Judgment ,Fund  Of  Knowledge   within normal limits              Recent  And  Remote memory  within normal limits              Attention &Concentration are within normal limits                                                 B) CRANIAL NERVES :             2 CN : Visual  Acuity  And  Visual fields  within normal limits                      Fundi  Could  Not  Be  Could  Not  Be  Evaluated. 3,4,6 CN : Both  Pupils are   Reactive and  Equal.                          Extraocular   Movements  Are  Intact. No  Nystagmus. No  TESSA. No  Afferent  Pupillary  Defect noted. 5 CN :  Normal  Facial sensations and Corneal  Reflexes         7 CN :  Normal  Facial  Symmetry  And  Strength. No facial  Weakness. 8 CN :  Hearing  Appears subnormal        9, 10 CN: Normal spontaneous, reflex palate movements       11 CN:   Normal  Shoulder shrug and  strength       12 CN :   Normal  Tongue movements and  Tongue  In midline                      No tongue   Fasciculations or atrophy     C) MOTOR  EXAM:                 Strength  In upper  And  Lower extremities   within normal limits             No  Drift. No  Atrophy             Rapid alternating  And  repetitions  Movements  within normal limits               Muscle  Tone  In upper  And  Lower  Extremities  within normal limits              No rigidity. No  Spasticity. Bradykinesia   absent               No  Asterixis. Sustention  Tremor , Resting  Tremor   absent                  No other  Abnormal  Movements noted         D) SENSORY :             light touch, pinprick, position  And  Vibration  subnormal      E) REFLEXES:                 Deep  Tendon  Reflexes within normal limits                  No pathological  Reflexes  Bilaterally. F) COORDINATION  AND  GAIT :                              Station and  Gait  within normal limits                                      Romberg's test negative                        Ataxia negative      Assessment:       Patient Active Problem List   Diagnosis    Hypothyroidism    Hyperlipidemia    Cerebral infarction ([address]    Memory loss due to medical condition    TIA (transient ischemic attack)    Meralgia paresthetica    Cataract    Myopia of both eyes with astigmatism and presbyopia    RLS (restless legs syndrome)    Myopia of both eyes    Lacunar infarction ([address]    CVA (cerebral vascular accident) ([address]    VBI (vertebrobasilar insufficiency)    JOHNATHAN due to [facility]           Bilateral carotid and vertebral arteries Doppler ultrasound is performed using grayscale, color Doppler and pulse-wave Doppler. Indication: 38-year-old female with a history of cerebral infarction, meralgia paresthetica, memory loss, hyperlipidemia, hypothyroidism, no vertebral basilar insufficiency and history of prior TIAs. Comparison: Prior carotid ultrasound from 2/16/09. Findings: Mild intimal thickening and slight mostly non-echogenic plaque identified. Left carotid artery:      1. The maximum peak systolic velocity in the proximal CCA is 92 cm/s, mid 66cm/s and distal 65cm/s. There is no diameter reduction. 2. The maximum peak systolic velocity in the proximal ICA 55cm/s, mid ICA 69 cm/s and distal ICA 114cm/s. There is no diameter reduction at the proximal ICA.       3. The maximum peak systolic velocity in the left external carotid artery is 110 cm/s. ICA/CCA =0.8. Right carotid artery:      1. The maximum peak systolic velocity in the proximal CCA is 93 cm/s, mid 64 cm/s and distal 65 cm/s. There is no diameter reduction at the right carotid bulb. 2. The maximum peak systolic velocity in the proximal [id number]/s, mid ICA 64 cm/s and distal ICA 68cm/s. There is no diameter reduction at the proximal ICA. 3. The maximum peak systolic velocity in the left external carotid artery is 64 cm/s. ICA/CCA= 1.2. There is evidence of bilateral antegrade flow in the vertebral arteries. Impression: No carotid artery stenosis or significant plaque disease. Stable examination compared to 2009. Final report electronically signed by Avis Lujan M.D. on 12/4/2015        . Plan:          VISITING DIAGNOSIS:      ICD-10-CM ICD-9-CM    1. Cerebrovascular accident (CVA) due to thrombosis of precerebral artery ([facility] [address] I63.00 433.91    2. Hypothyroidism, unspecified type E03.9 244.9    3. Lacunar infarction ([facility] [address] I63.9 434.91    4. Other specified transient cerebral ischemias G45.8 435.8    5. Memory loss due to medical condition R41.3 780.93    6. Meralgia paresthetica of left side G57.12 355.1    7. RLS (restless legs syndrome) G25.81 333.94    8. Late effects of CVA (cerebrovascular accident) I69.90 438.9    9. Hyperlipidemia, unspecified hyperlipidemia type E78.5 272.4    10. Dizziness R42 780.4               CONCERNS   &   INCREASED   RISK   FOR         * TIA,  CEREBRO  VASCULAR  ISCHEMIA, STROKE       *   COGNITIVE  &   MEMORY PROBLEMS  AND  DEMENTIAS                   VARIOUS  RISK   FACTORS   WERE  REVIEWED   AND   DISCUSSED. PLAN:       Reina Talamantes  Of  The  Diagnoses,  The  Management & Treatment  Options           AND    Care  plan  Were        Reviewed and   Discussed   With  patient. * Goals  And  Expectations  Of  The  Therapy  Discussed   And  Reviewed.        *   Benefits   And   Side  Effect  Profile Of  Medication/s   Were   Discussed             * Need   For  Further   Follow up For  The  Various  Problems  Were discussed. * Results  Of  The  Previous  Diagnostic tests were reviewed and questions answered. patient  understand the same. Medical  Decision  Making  Was  Made  Based on the   Complexity  Of  Above  Mentioned  Diagnoses,    Data reviewed   & diagnostic  Tests  Reviewed,  Risk  Of  Significant   Co morbidities and complicating   Factors. Medical  Decision  Was   High  Complexity  Due   To  The  Patient's  Multiple  Symptoms,  Advancing   Disease,  Complex  Treatment  Regimen,  Multiple medications and   Risk  Of   Side  Effects,  Difficulty  In  Medication  Management  And  Diagnostic  Challenges   In  View  Of  The  Associated   Co  Morbid  Conditions   And  Problems. * FALL   PRECAUTIONS. THESE  REVIEWED   AND  DISCUSSED      *   BE  CAREFUL  WITH  ACTIVITIES         *   ADEQUATE   FLUID  INTAKE   AND  ELECTROLYTE  BALANCE         * KEEP  DAIRY  OF   THE  NEUROLOGICAL  SYMPTOMS      RECORDING THE    DURATION  AND  FREQUENCY. *  AVOID    CONDITIONS  AND  FACTORS   THAT  MAKE   NEUROLOGICAL  SYMPTOMS  WORSE. *  TO  MAINTAIN  REGULAR  SLEEP  WAKE  CYCLES. *   TO  HAVE  ADEQUATE  REST  AND   SLEEP    HOURS.          *    TO   AVOID   TO  SLEEP  IN   SUPINE  POSITION. *      WEIGHT   LOSS. *    AVOID  ANY USAGE OF                 TOBACCO,  EXCESSIVE  ALCOHOL  AND   ILLEGAL   SUBSTANCES      *  CONTINUE MEDICATIONS PRESCRIBED BY NEUROLOGIST AS    RECOMMENDED     *   Compliance   With  Medications   And  Instructions      * CURRENTLY  TOLERATING  THE  PRESCRIBED   MEDICATIONS. WITHOUT  ANY  SIGNIFICANT  SIDE  EFFECTS   &  GETTING BENEFIT. *   MILD  SHORT TERM  MEMORY PROBLEMS    FOR    2  YEARS          PATIENT  NOT  CONCERNED.    IF  THERE  IS  ANY  CONCERN   PATIENT  COULD BE  TRIED  WITH  MEDICATION, IF  PATIENT  IS  WILLING  FOR  THE  SAME. *  May   Use  Pill  Box,    If  Needed      *    To  Continue  The    Antiplatelet  therapy    As   Recommended  Was   Discussed      *    Prophylactic  Use   Of     Vitamin   B   Complex,  Folic  Acid,    Vitamin  B12    Multivitamin   Tablet  Daily    Supplementations   Over  The  Counter  Discussed       *  PATIENT  IS  ALSO   COUNSELED   TO  KEEP    ACTIVITIES       A)   SIMPLE      B)  ORGANIZED      C)  WRITE   DOWN                  Orders Placed This Encounter   Procedures    US Transcranial Doppler Complete    VL DUP CAROTID BILATERAL             *PATIENT   TO  FOLLOW  UP  WITH   PRIMARY  CARE   AND   OTHER  CONSULTANTS  AS  BEFORE. *  Maintain   Healthy  Life Style    With   Periodic  Monitoring  Of    Any  Medical  Conditions  Including   Elevated  Blood  Pressure,  Lipid  Profile,   Blood  Sugar levels  And   Heart  Disease. *   Period   Screening  For  Cancers  Involving  Breast,  Colon,  Prostate, lungs  And  Other  Organs  As  Applicable,  In consultation   With  Your  Primary Care Providers. * Second  Neurological  Opinion  And  Evaluations  In  [facility]  Setting  If  Patient  Is  Interested. *  If  The  Patient remains  Neurologically  Stable   Return   To  [facility] Department   IN  3-6 MONTHS  TIME   FOR  FURTHER  FOLLOW UP.                 *  If   There is  Any  Significant  Worsening   Of  Current  Symptoms  And  Or  If patient  Develops   Any additional  New  Neurological  Symptoms  Or  Significant  Concerns   Should  Call  911 or  Go  To  Emergency  Department  For  Further  Immediate  Evaluation. *   The  Neurological   Findings,  Possible  Diagnosis,  Differential diagnoses   And  Options  For    Further   Investigations   And  management   Are  Discussed  Comprehensively.      Medications   And  Prescription   Risks  And  Side effects  Are Drink more water when you are thirsty. Eat at least 5 servings of fruits and vegetables every day. It may seem like a lot, but it is not hard to reach this goal. A serving or helping is 1 piece of fruit, 1 cup of vegetables, or 2 cups of leafy, raw vegetables. Have an apple or some carrot sticks as an afternoon snack instead of a candy bar. Try to have fruits and/or vegetables at every meal.  Make exercise part of your daily routine. You may want to start with simple activities, such as walking, bicycling, or slow swimming. Try to be active 30 to 60 minutes every day. You do not need to do all 30 to 60 minutes all at once. For example, you can exercise 3 times a day for 10 or 20 minutes. Moderate exercise is safe for most people, but it is always a good idea to talk to your doctor before starting an exercise program.  Keep moving. Estephania Dieudonne the lawn, work in the garden, or [employer]. Take the stairs instead of the elevator at work. If you smoke, quit. People who smoke have an increased risk for heart attack, stroke, cancer, and other lung illnesses. Quitting is hard, but there are ways to boost your chance of quitting tobacco for good. Use nicotine gum, patches, or lozenges. Ask your doctor about stop-smoking programs and medicines. Keep trying. In addition to reducing your risk of diseases in the future, you will notice some benefits soon after you stop using tobacco. If you have shortness of breath or asthma symptoms, they will likely get better within a few weeks after you quit. Limit how much alcohol you drink. Moderate amounts of alcohol (up to 2 drinks a day for men, 1 drink a day for women) are okay. But drinking too much can lead to liver problems, high blood pressure, and other health problems. Family health  If you have a family, there are many things you can do together to improve your health. Eat meals together as a family as often as possible. Eat healthy foods.  This includes fruits, vegetables, lean meats and dairy, and whole grains. Include your family in your fitness plan. Most people think of activities such as jogging or tennis as the way to fitness, but there are many ways you and your family can be more active. Anything that makes you breathe hard and gets your heart pumping is exercise. Here are some tips:  Walk to do errands or to take your child to school or the bus. Go for a family bike ride after dinner instead of watching TV. Where can you learn more? Go to https://[employer]. org and sign in to your [employer] account. Enter P280 in the [employer] box to learn more about \"A Healthy Lifestyle: Care Instructions. \"     If you do not have an account, please click on the \"Sign Up Now\" link. Current as of: July 26, 2016  Content Version: 11.2  © 1596-8982 [employer]. Care instructions adapted under license by [facility] ([facility]). If you have questions about a medical condition or this instruction, always ask your healthcare professional. [address] any warranty or liability for your use of this information.

## 2017-11-20 ENCOUNTER — HOSPITAL ENCOUNTER (OUTPATIENT)
Dept: NEUROLOGY | Age: 67
Discharge: HOME | End: 2017-11-20
Payer: MEDICARE

## 2017-11-20 ENCOUNTER — HOSPITAL ENCOUNTER (OUTPATIENT)
Dept: INTERVENTIONAL RADIOLOGY/VASCULAR | Age: 67
Discharge: HOME | End: 2017-11-20
Payer: MEDICARE

## 2017-11-20 DIAGNOSIS — R42 DIZZINESS: ICD-10-CM

## 2017-11-20 DIAGNOSIS — I63.81 LACUNAR INFARCTION (HCC): ICD-10-CM

## 2017-11-20 DIAGNOSIS — R41.3 MEMORY LOSS DUE TO MEDICAL CONDITION: ICD-10-CM

## 2017-11-20 DIAGNOSIS — I63.00 CEREBROVASCULAR ACCIDENT (CVA) DUE TO THROMBOSIS OF PRECEREBRAL ARTERY (HCC): ICD-10-CM

## 2017-11-20 PROCEDURE — 93886 INTRACRANIAL COMPLETE STUDY: CPT

## 2017-11-20 PROCEDURE — 93880 EXTRACRANIAL BILAT STUDY: CPT

## 2017-11-20 PROCEDURE — 93892 TCD EMBOLI DETECT W/O INJ: CPT

## 2017-12-11 RX ORDER — CLOPIDOGREL BISULFATE 75 MG/1
TABLET ORAL
Qty: 30 TABLET | Refills: 5 | Status: SHIPPED | OUTPATIENT
Start: 2017-12-11 | End: 2018-05-14 | Stop reason: SDUPTHER

## 2017-12-11 RX ORDER — ROPINIROLE 1 MG/1
TABLET, FILM COATED ORAL
Qty: 30 TABLET | Refills: 5 | Status: SHIPPED | OUTPATIENT
Start: 2017-12-11 | End: 2018-05-14 | Stop reason: SDUPTHER

## 2017-12-13 ENCOUNTER — OFFICE VISIT (OUTPATIENT)
Dept: ALLERGY | Age: 67
End: 2017-12-13
Payer: MEDICARE

## 2017-12-13 VITALS
SYSTOLIC BLOOD PRESSURE: 132 MMHG | HEIGHT: 61 IN | WEIGHT: 181 LBS | HEART RATE: 68 BPM | DIASTOLIC BLOOD PRESSURE: 78 MMHG | BODY MASS INDEX: 34.17 KG/M2

## 2017-12-13 DIAGNOSIS — J30.1 CHRONIC SEASONAL ALLERGIC RHINITIS DUE TO POLLEN: ICD-10-CM

## 2017-12-13 DIAGNOSIS — K21.9 LPRD (LARYNGOPHARYNGEAL REFLUX DISEASE): Primary | ICD-10-CM

## 2017-12-13 DIAGNOSIS — G47.30 SLEEP APNEA, UNSPECIFIED TYPE: ICD-10-CM

## 2017-12-13 PROCEDURE — 3014F SCREEN MAMMO DOC REV: CPT | Performed by: NURSE PRACTITIONER

## 2017-12-13 PROCEDURE — 4040F PNEUMOC VAC/ADMIN/RCVD: CPT | Performed by: NURSE PRACTITIONER

## 2017-12-13 PROCEDURE — G8427 DOCREV CUR MEDS BY ELIG CLIN: HCPCS | Performed by: NURSE PRACTITIONER

## 2017-12-13 PROCEDURE — G8399 PT W/DXA RESULTS DOCUMENT: HCPCS | Performed by: NURSE PRACTITIONER

## 2017-12-13 PROCEDURE — 3017F COLORECTAL CA SCREEN DOC REV: CPT | Performed by: NURSE PRACTITIONER

## 2017-12-13 PROCEDURE — G8417 CALC BMI ABV UP PARAM F/U: HCPCS | Performed by: NURSE PRACTITIONER

## 2017-12-13 PROCEDURE — 1090F PRES/ABSN URINE INCON ASSESS: CPT | Performed by: NURSE PRACTITIONER

## 2017-12-13 PROCEDURE — 1123F ACP DISCUSS/DSCN MKR DOCD: CPT | Performed by: NURSE PRACTITIONER

## 2017-12-13 PROCEDURE — G8484 FLU IMMUNIZE NO ADMIN: HCPCS | Performed by: NURSE PRACTITIONER

## 2017-12-13 PROCEDURE — 1036F TOBACCO NON-USER: CPT | Performed by: NURSE PRACTITIONER

## 2017-12-13 PROCEDURE — G8598 ASA/ANTIPLAT THER USED: HCPCS | Performed by: NURSE PRACTITIONER

## 2017-12-13 PROCEDURE — 99213 OFFICE O/P EST LOW 20 MIN: CPT | Performed by: NURSE PRACTITIONER

## 2018-01-25 ENCOUNTER — OFFICE VISIT (OUTPATIENT)
Dept: FAMILY MEDICINE CLINIC | Age: 68
End: 2018-01-25
Payer: MEDICARE

## 2018-01-25 VITALS
OXYGEN SATURATION: 97 % | HEIGHT: 61 IN | WEIGHT: 180 LBS | DIASTOLIC BLOOD PRESSURE: 68 MMHG | BODY MASS INDEX: 33.99 KG/M2 | HEART RATE: 77 BPM | SYSTOLIC BLOOD PRESSURE: 110 MMHG

## 2018-01-25 DIAGNOSIS — E78.00 PURE HYPERCHOLESTEROLEMIA: ICD-10-CM

## 2018-01-25 DIAGNOSIS — G45.9 TRANSIENT CEREBRAL ISCHEMIA, UNSPECIFIED TYPE: ICD-10-CM

## 2018-01-25 DIAGNOSIS — I10 ESSENTIAL HYPERTENSION: ICD-10-CM

## 2018-01-25 DIAGNOSIS — E55.9 VITAMIN D DEFICIENCY: ICD-10-CM

## 2018-01-25 DIAGNOSIS — Z12.39 SCREENING FOR BREAST CANCER: Primary | ICD-10-CM

## 2018-01-25 DIAGNOSIS — R73.01 IMPAIRED FASTING GLUCOSE: ICD-10-CM

## 2018-01-25 DIAGNOSIS — E03.9 ACQUIRED HYPOTHYROIDISM: ICD-10-CM

## 2018-01-25 PROCEDURE — G8427 DOCREV CUR MEDS BY ELIG CLIN: HCPCS | Performed by: PHYSICIAN ASSISTANT

## 2018-01-25 PROCEDURE — 1123F ACP DISCUSS/DSCN MKR DOCD: CPT | Performed by: PHYSICIAN ASSISTANT

## 2018-01-25 PROCEDURE — 99214 OFFICE O/P EST MOD 30 MIN: CPT | Performed by: PHYSICIAN ASSISTANT

## 2018-01-25 PROCEDURE — 4040F PNEUMOC VAC/ADMIN/RCVD: CPT | Performed by: PHYSICIAN ASSISTANT

## 2018-01-25 PROCEDURE — 3017F COLORECTAL CA SCREEN DOC REV: CPT | Performed by: PHYSICIAN ASSISTANT

## 2018-01-25 PROCEDURE — G8417 CALC BMI ABV UP PARAM F/U: HCPCS | Performed by: PHYSICIAN ASSISTANT

## 2018-01-25 PROCEDURE — G8484 FLU IMMUNIZE NO ADMIN: HCPCS | Performed by: PHYSICIAN ASSISTANT

## 2018-01-25 PROCEDURE — 3014F SCREEN MAMMO DOC REV: CPT | Performed by: PHYSICIAN ASSISTANT

## 2018-01-25 PROCEDURE — 1090F PRES/ABSN URINE INCON ASSESS: CPT | Performed by: PHYSICIAN ASSISTANT

## 2018-01-25 PROCEDURE — G8598 ASA/ANTIPLAT THER USED: HCPCS | Performed by: PHYSICIAN ASSISTANT

## 2018-01-25 PROCEDURE — G8399 PT W/DXA RESULTS DOCUMENT: HCPCS | Performed by: PHYSICIAN ASSISTANT

## 2018-01-25 PROCEDURE — 1036F TOBACCO NON-USER: CPT | Performed by: PHYSICIAN ASSISTANT

## 2018-01-25 ASSESSMENT — ENCOUNTER SYMPTOMS
RHINORRHEA: 1
GASTROINTESTINAL NEGATIVE: 1
RESPIRATORY NEGATIVE: 1

## 2018-01-25 ASSESSMENT — PATIENT HEALTH QUESTIONNAIRE - PHQ9
SUM OF ALL RESPONSES TO PHQ9 QUESTIONS 1 & 2: 0
SUM OF ALL RESPONSES TO PHQ QUESTIONS 1-9: 0
1. LITTLE INTEREST OR PLEASURE IN DOING THINGS: 0
2. FEELING DOWN, DEPRESSED OR HOPELESS: 0

## 2018-01-25 NOTE — PROGRESS NOTES
bruits noted. Cardiovascular: Normal rate, regular rhythm and normal heart sounds. No murmur heard. Pulmonary/Chest: Effort normal and breath sounds normal. She has no wheezes. She has no rales. Abdominal: Soft. Bowel sounds are normal. She exhibits no distension and no mass. There is no tenderness. There is no rebound and no guarding. Musculoskeletal: She exhibits no edema. Lymphadenopathy:     She has no cervical adenopathy. Neurological: She is alert and oriented to person, place, and time. Skin: Skin is warm and dry. No rash noted. Psychiatric: She has a normal mood and affect. Her behavior is normal. Judgment and thought content normal.   Nursing note and vitals reviewed. Assessment & Plan:      1. Screening for breast cancer  [facility] DIGITAL SCREEN W CAD BILATERAL   2. Pure hypercholesterolemia  CBC Auto Differential    Comprehensive Metabolic Panel    Lipid Panel   3. Vitamin D deficiency  Vitamin D 25 Hydroxy   4. Impaired fasting glucose  CBC Auto Differential    Comprehensive Metabolic Panel    Hemoglobin A1C   5. Essential hypertension  Comprehensive Metabolic Panel   6. Acquired hypothyroidism  TSH without Reflex    T4, Free   7. Transient cerebral ischemia, unspecified type       She is doing really well follow-up in 6 months sooner if problems  Stay active like she is she still walks every day  Good nutrition hydration  She will be notified of all results  Further treatment based on results  Follow-up with cardiology and neurology as scheduled    Ольга received counseling on the following healthy behaviors: nutrition, exercise and medication adherence    Patient given educational materials on Diabetes, Hyperlipidemia, Nutrition, Exercise and Hypertension    I have instructed Jack Trinidad to complete a self tracking handout on Blood Sugars , Blood Pressures  and Weights and instructed them to bring it with them to her next appointment.      Discussed use, benefit, and side effects of

## 2018-04-18 DIAGNOSIS — E03.9 ACQUIRED HYPOTHYROIDISM: ICD-10-CM

## 2018-04-18 RX ORDER — LEVOTHYROXINE SODIUM 0.12 MG/1
TABLET ORAL
Qty: 30 TABLET | Refills: 5 | Status: SHIPPED | OUTPATIENT
Start: 2018-04-18 | End: 2018-10-08 | Stop reason: SDUPTHER

## 2018-05-14 ENCOUNTER — OFFICE VISIT (OUTPATIENT)
Dept: NEUROLOGY | Age: 68
End: 2018-05-14
Payer: MEDICARE

## 2018-05-14 VITALS
HEIGHT: 61 IN | WEIGHT: 179 LBS | SYSTOLIC BLOOD PRESSURE: 122 MMHG | OXYGEN SATURATION: 98 % | BODY MASS INDEX: 33.79 KG/M2 | HEART RATE: 63 BPM | DIASTOLIC BLOOD PRESSURE: 64 MMHG

## 2018-05-14 DIAGNOSIS — E88.81 INSULIN RESISTANCE: ICD-10-CM

## 2018-05-14 DIAGNOSIS — I63.9 CEREBROVASCULAR ACCIDENT (CVA), UNSPECIFIED MECHANISM (HCC): ICD-10-CM

## 2018-05-14 DIAGNOSIS — I63.00 CEREBRAL INFARCTION DUE TO THROMBOSIS OF PRECEREBRAL ARTERY (HCC): Primary | ICD-10-CM

## 2018-05-14 DIAGNOSIS — G57.12 MERALGIA PARESTHETICA OF LEFT SIDE: ICD-10-CM

## 2018-05-14 DIAGNOSIS — E78.5 HYPERLIPIDEMIA, UNSPECIFIED HYPERLIPIDEMIA TYPE: ICD-10-CM

## 2018-05-14 DIAGNOSIS — R41.3 MEMORY LOSS DUE TO MEDICAL CONDITION: ICD-10-CM

## 2018-05-14 DIAGNOSIS — G45.0 VBI (VERTEBROBASILAR INSUFFICIENCY): ICD-10-CM

## 2018-05-14 DIAGNOSIS — G25.81 RLS (RESTLESS LEGS SYNDROME): ICD-10-CM

## 2018-05-14 DIAGNOSIS — G45.8 OTHER SPECIFIED TRANSIENT CEREBRAL ISCHEMIAS: ICD-10-CM

## 2018-05-14 DIAGNOSIS — E78.49 OTHER HYPERLIPIDEMIA: ICD-10-CM

## 2018-05-14 DIAGNOSIS — R42 DIZZINESS: ICD-10-CM

## 2018-05-14 DIAGNOSIS — E03.9 ACQUIRED HYPOTHYROIDISM: ICD-10-CM

## 2018-05-14 DIAGNOSIS — I63.81 LACUNAR INFARCTION (HCC): ICD-10-CM

## 2018-05-14 DIAGNOSIS — G45.9 TRANSIENT CEREBRAL ISCHEMIA, UNSPECIFIED TYPE: ICD-10-CM

## 2018-05-14 PROCEDURE — G8427 DOCREV CUR MEDS BY ELIG CLIN: HCPCS | Performed by: PSYCHIATRY & NEUROLOGY

## 2018-05-14 PROCEDURE — 3017F COLORECTAL CA SCREEN DOC REV: CPT | Performed by: PSYCHIATRY & NEUROLOGY

## 2018-05-14 PROCEDURE — 1090F PRES/ABSN URINE INCON ASSESS: CPT | Performed by: PSYCHIATRY & NEUROLOGY

## 2018-05-14 PROCEDURE — G8399 PT W/DXA RESULTS DOCUMENT: HCPCS | Performed by: PSYCHIATRY & NEUROLOGY

## 2018-05-14 PROCEDURE — G8417 CALC BMI ABV UP PARAM F/U: HCPCS | Performed by: PSYCHIATRY & NEUROLOGY

## 2018-05-14 PROCEDURE — 4040F PNEUMOC VAC/ADMIN/RCVD: CPT | Performed by: PSYCHIATRY & NEUROLOGY

## 2018-05-14 PROCEDURE — 1036F TOBACCO NON-USER: CPT | Performed by: PSYCHIATRY & NEUROLOGY

## 2018-05-14 PROCEDURE — 99215 OFFICE O/P EST HI 40 MIN: CPT | Performed by: PSYCHIATRY & NEUROLOGY

## 2018-05-14 PROCEDURE — G8598 ASA/ANTIPLAT THER USED: HCPCS | Performed by: PSYCHIATRY & NEUROLOGY

## 2018-05-14 PROCEDURE — 1123F ACP DISCUSS/DSCN MKR DOCD: CPT | Performed by: PSYCHIATRY & NEUROLOGY

## 2018-05-14 RX ORDER — ROPINIROLE 1 MG/1
TABLET, FILM COATED ORAL
Qty: 30 TABLET | Refills: 5 | Status: SHIPPED | OUTPATIENT
Start: 2018-05-14 | End: 2018-11-12 | Stop reason: SDUPTHER

## 2018-05-14 RX ORDER — CLOPIDOGREL BISULFATE 75 MG/1
TABLET ORAL
Qty: 30 TABLET | Refills: 5 | Status: SHIPPED | OUTPATIENT
Start: 2018-05-14 | End: 2018-11-12 | Stop reason: SDUPTHER

## 2018-05-14 ASSESSMENT — ENCOUNTER SYMPTOMS
EYE DISCHARGE: 0
EYE REDNESS: 0
CONSTIPATION: 0
TROUBLE SWALLOWING: 0
CHANGE IN BOWEL HABIT: 0
COUGH: 0
WHEEZING: 0
APNEA: 0
CHEST TIGHTNESS: 0
CHOKING: 0
EYE ITCHING: 0
SWOLLEN GLANDS: 0
VISUAL CHANGE: 0
COLOR CHANGE: 0
VOICE CHANGE: 0
PHOTOPHOBIA: 0
ABDOMINAL PAIN: 0
SINUS PRESSURE: 0
DIARRHEA: 0
NAUSEA: 0
FACIAL SWELLING: 0
EYE PAIN: 0
BLOOD IN STOOL: 0
ABDOMINAL DISTENTION: 0
BACK PAIN: 0
SHORTNESS OF BREATH: 0
VOMITING: 0
BOWEL INCONTINENCE: 0
SORE THROAT: 0

## 2018-05-29 ENCOUNTER — TELEPHONE (OUTPATIENT)
Dept: OPTOMETRY | Age: 68
End: 2018-05-29

## 2018-06-26 ENCOUNTER — OFFICE VISIT (OUTPATIENT)
Dept: CARDIOLOGY | Age: 68
End: 2018-06-26
Payer: MEDICARE

## 2018-06-26 VITALS
BODY MASS INDEX: 33.79 KG/M2 | DIASTOLIC BLOOD PRESSURE: 44 MMHG | SYSTOLIC BLOOD PRESSURE: 110 MMHG | HEIGHT: 61 IN | WEIGHT: 179 LBS | HEART RATE: 75 BPM

## 2018-06-26 DIAGNOSIS — I63.00 CEREBROVASCULAR ACCIDENT (CVA) DUE TO THROMBOSIS OF PRECEREBRAL ARTERY (HCC): ICD-10-CM

## 2018-06-26 DIAGNOSIS — E78.49 OTHER HYPERLIPIDEMIA: Primary | ICD-10-CM

## 2018-06-26 DIAGNOSIS — G45.8 OTHER SPECIFIED TRANSIENT CEREBRAL ISCHEMIAS: ICD-10-CM

## 2018-06-26 PROCEDURE — 93000 ELECTROCARDIOGRAM COMPLETE: CPT | Performed by: INTERNAL MEDICINE

## 2018-06-26 PROCEDURE — 99213 OFFICE O/P EST LOW 20 MIN: CPT | Performed by: INTERNAL MEDICINE

## 2018-07-12 RX ORDER — ROPINIROLE 1 MG/1
TABLET, FILM COATED ORAL
Qty: 30 TABLET | Refills: 5 | Status: SHIPPED | OUTPATIENT
Start: 2018-07-12 | End: 2018-11-12 | Stop reason: SDUPTHER

## 2018-07-16 ASSESSMENT — REFRACTION_MANIFEST
OS_ADD: +2.25
OD_ADD: +2.25

## 2018-07-23 ENCOUNTER — HOSPITAL ENCOUNTER (OUTPATIENT)
Dept: LAB | Age: 68
Setting detail: SPECIMEN
Discharge: HOME | End: 2018-07-23
Payer: MEDICARE

## 2018-07-23 DIAGNOSIS — E55.9 VITAMIN D DEFICIENCY: ICD-10-CM

## 2018-07-23 DIAGNOSIS — E78.00 PURE HYPERCHOLESTEROLEMIA: ICD-10-CM

## 2018-07-23 DIAGNOSIS — E03.9 ACQUIRED HYPOTHYROIDISM: ICD-10-CM

## 2018-07-23 DIAGNOSIS — R73.01 IMPAIRED FASTING GLUCOSE: ICD-10-CM

## 2018-07-23 DIAGNOSIS — I10 ESSENTIAL HYPERTENSION: ICD-10-CM

## 2018-07-23 LAB
ABSOLUTE EOS #: 0.3 K/UL (ref 0–0.4)
ABSOLUTE IMMATURE GRANULOCYTE: ABNORMAL K/UL (ref 0–0.3)
ABSOLUTE LYMPH #: 1.8 K/UL (ref 1–4.8)
ABSOLUTE MONO #: 1 K/UL (ref 0.1–1.2)
ALBUMIN SERPL-MCNC: 4.1 G/DL (ref 3.5–5.2)
ALBUMIN/GLOBULIN RATIO: 1.2 (ref 1–2.5)
ALP BLD-CCNC: 85 U/L (ref 35–104)
ALT SERPL-CCNC: 23 U/L (ref 5–33)
ANION GAP SERPL CALCULATED.3IONS-SCNC: 11 MMOL/L (ref 9–17)
AST SERPL-CCNC: 23 U/L
BASOPHILS # BLD: 1 % (ref 0–1)
BASOPHILS ABSOLUTE: 0.1 K/UL (ref 0–0.2)
BILIRUB SERPL-MCNC: 1.28 MG/DL (ref 0.3–1.2)
BUN BLDV-MCNC: 17 MG/DL (ref 8–23)
BUN/CREAT BLD: 27 (ref 9–20)
CALCIUM SERPL-MCNC: 9.7 MG/DL (ref 8.6–10.4)
CHLORIDE BLD-SCNC: 103 MMOL/L (ref 98–107)
CHOLESTEROL/HDL RATIO: 3.2
CHOLESTEROL: 175 MG/DL
CO2: 28 MMOL/L (ref 20–31)
CREAT SERPL-MCNC: 0.64 MG/DL (ref 0.5–0.9)
DIFFERENTIAL TYPE: ABNORMAL
EOSINOPHILS RELATIVE PERCENT: 4 % (ref 1–7)
ESTIMATED AVERAGE GLUCOSE: 114 MG/DL
GFR AFRICAN AMERICAN: >60 ML/MIN
GFR NON-AFRICAN AMERICAN: >60 ML/MIN
GFR SERPL CREATININE-BSD FRML MDRD: ABNORMAL ML/MIN/{1.73_M2}
GFR SERPL CREATININE-BSD FRML MDRD: ABNORMAL ML/MIN/{1.73_M2}
GLUCOSE BLD-MCNC: 103 MG/DL (ref 70–99)
HBA1C MFR BLD: 5.6 % (ref 4.8–5.9)
HCT VFR BLD CALC: 39.6 % (ref 36–46)
HDLC SERPL-MCNC: 54 MG/DL
HEMOGLOBIN: 13.3 G/DL (ref 12–16)
IMMATURE GRANULOCYTES: ABNORMAL %
LDL CHOLESTEROL: 91 MG/DL (ref 0–130)
LYMPHOCYTES # BLD: 21 % (ref 16–46)
MCH RBC QN AUTO: 28.9 PG (ref 26–34)
MCHC RBC AUTO-ENTMCNC: 33.6 G/DL (ref 31–37)
MCV RBC AUTO: 86.2 FL (ref 80–100)
MONOCYTES # BLD: 12 % (ref 4–11)
NRBC AUTOMATED: ABNORMAL PER 100 WBC
PDW BLD-RTO: 14.3 % (ref 11–14.5)
PLATELET # BLD: 312 K/UL (ref 140–450)
PLATELET ESTIMATE: ABNORMAL
PMV BLD AUTO: 8.3 FL (ref 6–12)
POTASSIUM SERPL-SCNC: 4.3 MMOL/L (ref 3.7–5.3)
RBC # BLD: 4.6 M/UL (ref 4–5.2)
RBC # BLD: ABNORMAL 10*6/UL
SEG NEUTROPHILS: 62 % (ref 43–77)
SEGMENTED NEUTROPHILS ABSOLUTE COUNT: 5.3 K/UL (ref 1.8–7.7)
SODIUM BLD-SCNC: 142 MMOL/L (ref 135–144)
THYROXINE, FREE: 1.49 NG/DL (ref 0.93–1.7)
TOTAL PROTEIN: 7.6 G/DL (ref 6.4–8.3)
TRIGL SERPL-MCNC: 151 MG/DL
TSH SERPL DL<=0.05 MIU/L-ACNC: 3.76 MIU/L (ref 0.3–5)
VITAMIN D 25-HYDROXY: 45.6 NG/ML (ref 30–100)
VLDLC SERPL CALC-MCNC: ABNORMAL MG/DL (ref 1–30)
WBC # BLD: 8.6 K/UL (ref 3.5–11)
WBC # BLD: ABNORMAL 10*3/UL

## 2018-07-23 PROCEDURE — 84439 ASSAY OF FREE THYROXINE: CPT

## 2018-07-23 PROCEDURE — 85025 COMPLETE CBC W/AUTO DIFF WBC: CPT

## 2018-07-23 PROCEDURE — 80053 COMPREHEN METABOLIC PANEL: CPT

## 2018-07-23 PROCEDURE — 82306 VITAMIN D 25 HYDROXY: CPT

## 2018-07-23 PROCEDURE — 84443 ASSAY THYROID STIM HORMONE: CPT

## 2018-07-23 PROCEDURE — 80061 LIPID PANEL: CPT

## 2018-07-23 PROCEDURE — 36415 COLL VENOUS BLD VENIPUNCTURE: CPT

## 2018-07-23 PROCEDURE — 83036 HEMOGLOBIN GLYCOSYLATED A1C: CPT

## 2018-07-26 ENCOUNTER — OFFICE VISIT (OUTPATIENT)
Dept: FAMILY MEDICINE CLINIC | Age: 68
End: 2018-07-26
Payer: MEDICARE

## 2018-07-26 VITALS
SYSTOLIC BLOOD PRESSURE: 120 MMHG | OXYGEN SATURATION: 94 % | HEART RATE: 76 BPM | WEIGHT: 177 LBS | BODY MASS INDEX: 33.42 KG/M2 | HEIGHT: 61 IN | DIASTOLIC BLOOD PRESSURE: 60 MMHG

## 2018-07-26 DIAGNOSIS — I63.9 CEREBELLAR INFARCTION (HCC): ICD-10-CM

## 2018-07-26 DIAGNOSIS — E03.9 HYPOTHYROIDISM, UNSPECIFIED TYPE: ICD-10-CM

## 2018-07-26 DIAGNOSIS — E78.49 OTHER HYPERLIPIDEMIA: Primary | ICD-10-CM

## 2018-07-26 DIAGNOSIS — G25.81 RLS (RESTLESS LEGS SYNDROME): ICD-10-CM

## 2018-07-26 DIAGNOSIS — Z86.73 HISTORY OF TRANSIENT ISCHEMIC ATTACK (TIA): ICD-10-CM

## 2018-07-26 DIAGNOSIS — I63.81 LACUNAR INFARCTION (HCC): ICD-10-CM

## 2018-07-26 DIAGNOSIS — R73.09 ELEVATED GLUCOSE: ICD-10-CM

## 2018-07-26 DIAGNOSIS — Z23 NEED FOR SHINGLES VACCINE: ICD-10-CM

## 2018-07-26 PROCEDURE — 3017F COLORECTAL CA SCREEN DOC REV: CPT | Performed by: PHYSICIAN ASSISTANT

## 2018-07-26 PROCEDURE — 99214 OFFICE O/P EST MOD 30 MIN: CPT | Performed by: PHYSICIAN ASSISTANT

## 2018-07-26 PROCEDURE — 4040F PNEUMOC VAC/ADMIN/RCVD: CPT | Performed by: PHYSICIAN ASSISTANT

## 2018-07-26 PROCEDURE — 1123F ACP DISCUSS/DSCN MKR DOCD: CPT | Performed by: PHYSICIAN ASSISTANT

## 2018-07-26 PROCEDURE — 1101F PT FALLS ASSESS-DOCD LE1/YR: CPT | Performed by: PHYSICIAN ASSISTANT

## 2018-07-26 PROCEDURE — G8598 ASA/ANTIPLAT THER USED: HCPCS | Performed by: PHYSICIAN ASSISTANT

## 2018-07-26 PROCEDURE — 1036F TOBACCO NON-USER: CPT | Performed by: PHYSICIAN ASSISTANT

## 2018-07-26 PROCEDURE — G8417 CALC BMI ABV UP PARAM F/U: HCPCS | Performed by: PHYSICIAN ASSISTANT

## 2018-07-26 PROCEDURE — 1090F PRES/ABSN URINE INCON ASSESS: CPT | Performed by: PHYSICIAN ASSISTANT

## 2018-07-26 PROCEDURE — G8427 DOCREV CUR MEDS BY ELIG CLIN: HCPCS | Performed by: PHYSICIAN ASSISTANT

## 2018-07-26 PROCEDURE — G8399 PT W/DXA RESULTS DOCUMENT: HCPCS | Performed by: PHYSICIAN ASSISTANT

## 2018-07-26 ASSESSMENT — ENCOUNTER SYMPTOMS
GASTROINTESTINAL NEGATIVE: 1
RESPIRATORY NEGATIVE: 1

## 2018-07-26 NOTE — PROGRESS NOTES
[facility]    Subjective:      Patient ID: Toni Wood is a 77 y.o. y.o. female. Patient is seen for six month follow up on HTN she is doing well. Just had recent lab work as well here to review. No recent illness is doing fine and no concerns. No dizziness, numbness tingling or shortness of breath. Following up with neurology and cardiology as schedule and they have not had any concerns either. Is very active and walking a lot as usual with no problems.         Past Medical History:   Diagnosis Date    Acute confusion     CVA (cerebral infarction)     Hyperlipidemia     Hypothyroidism     Lacunar infarction ([facility] [address]     Memory loss due to medical condition     Meralgia paresthetica     left side    Myopia with astigmatism and presbyopia     Perennial allergic rhinitis     RLS (restless legs syndrome)     TIA (transient ischemic attack) , 2014    with brief confusion    VBI (vertebrobasilar insufficiency)        Past Surgical History:   Procedure Laterality Date    CARDIAC CATHETERIZATION  2014    [facility] cath    COLONOSCOPY  2015    mild diverticulosis:  Kari BERNARD    INSERTABLE CARDIAC MONITOR  16    removed    PRE-MALIGNANT / BENIGN SKIN LESION EXCISION      hemangioma removal on right foot 2nd toe    WISDOM TOOTH EXTRACTION  1981       Family History   Problem Relation Age of Onset    Stroke Mother     Other Mother          from pneumonia    Liver Disease Father         due to alcohol    Heart Disease Father         valve disease    Diabetes Father     Thyroid Disease Sister     Cancer Sister         breast cancer older sister    Diabetes Brother     High Cholesterol Sister     Diabetes Brother     Other Paternal Aunt         late onset alzheimers    Glaucoma Neg Hx     Cataracts Neg Hx        Allergies   Allergen Reactions    Sulfamethoxazole-Trimethoprim Swelling     lips    Penicillins Rash       Current Outpatient Prescriptions   Medication Sig Dispense Refill    rOPINIRole (REQUIP) 1 MG tablet take 1 tablet by mouth at bedtime 30 tablet 5    clopidogrel (PLAVIX) 75 MG tablet take 1 tablet by mouth once daily 30 tablet 5    rOPINIRole (REQUIP) 1 MG tablet take 1 tablet by mouth at bedtime 30 tablet 5    levothyroxine (SYNTHROID) 125 MCG tablet take 1 tablet by mouth once daily 30 tablet 5    metoprolol tartrate (LOPRESSOR) 25 MG tablet take 1/2 tablet by mouth twice a day 180 tablet 3    atorvastatin (LIPITOR) 10 MG tablet take 1 tablet by mouth once daily 30 tablet 11    melatonin 5 MG TABS tablet Take 5 mg by mouth nightly      fluticasone (FLONASE) 50 MCG/ACT nasal spray 2 sprays to each nostril once daily. 1 Bottle 11    aspirin 81 MG tablet Take 81 mg by mouth daily.  Vitamin D (CHOLECALCIFEROL) 1000 UNITS CAPS capsule Take 3,000 Units by mouth daily       Multiple Vitamin (MULTI-VITAMINS PO) Take 1 tablet by mouth Daily. No current facility-administered medications for this visit. Review of Systems   Constitutional: Negative. Negative for appetite change, fatigue and fever. HENT: Negative. Negative for congestion, sore throat and trouble swallowing. Eyes: Negative for visual disturbance. ANGELA is scheduled had one this year sees Dr. Nona Pham. Has floaters. Respiratory: Negative. Negative for cough, chest tightness, shortness of breath and wheezing. Cardiovascular: Negative. Negative for chest pain, palpitations and leg swelling. Her varicose veins to not bother. Gastrointestinal: Negative. Negative for constipation, diarrhea, nausea and vomiting. Genitourinary: Negative for difficulty urinating, dysuria and frequency. Musculoskeletal: Negative. Negative for arthralgias, gait problem and myalgias. Skin: Negative. Negative for rash. Neurological: Negative.   Negative for tremors, syncope, speech difficulty, weakness, light-headedness, numbness and headaches. Occasionally will feel a tremor slightly in the left hand. Nothing to impressive. Psychiatric/Behavioral: Negative for sleep disturbance. The patient is not nervous/anxious. Has SAMUEL will not use CPAP. Lives with . Objective:      /60   Pulse 76   Ht 5' 1\" (1.549 m)   Wt 177 lb (80.3 kg)   LMP 01/01/1998   SpO2 94%   BMI 33.44 kg/m²     Physical Exam   Constitutional: She is oriented to person, place, and time. She appears well-developed and well-nourished. No distress. HENT:   Head: Normocephalic and atraumatic. Nose: Nose normal.   Mouth/Throat: Oropharynx is clear and moist. No oropharyngeal exudate. Erythema face and nasal bridge noted and frontal area. Eyes: Conjunctivae are normal. No scleral icterus. Neck: Normal range of motion. Neck supple. No JVD present. No thyromegaly present. Cardiovascular: Normal rate, regular rhythm and normal heart sounds. No murmur heard. Pulmonary/Chest: Effort normal and breath sounds normal. She has no wheezes. Abdominal: Soft. Bowel sounds are normal. She exhibits no distension and no mass. There is no tenderness. Musculoskeletal: She exhibits no edema. Lymphadenopathy:     She has no cervical adenopathy. Neurological: She is alert and oriented to person, place, and time. Skin: Skin is warm and dry. No rash noted. Psychiatric: She has a normal mood and affect. Her behavior is normal. Judgment and thought content normal.   Nursing note and vitals reviewed.     Hospital Outpatient Visit on 07/23/2018   Component Date Value Ref Range Status    WBC 07/23/2018 8.6  3.5 - 11.0 k/uL Final    RBC 07/23/2018 4.60  4.0 - 5.2 m/uL Final    Hemoglobin 07/23/2018 13.3  12.0 - 16.0 g/dL Final    Hematocrit 07/23/2018 39.6  36 - 46 % Final    MCV 07/23/2018 86.2  80 - 100 fL Final    MCH 07/23/2018 28.9  26 - 34 pg Final    MCHC 07/23/2018 33.6  31 - 37 g/dL Final    RDW 07/23/2018 14.3  11.0 - 14.5 % Final    Platelets 52/48/9193 312  140 - 450 k/uL Final    MPV 07/23/2018 8.3  6.0 - 12.0 fL Final    NRBC Automated 07/23/2018 NOT REPORTED  per 100 WBC Final    Differential Type 07/23/2018 NOT REPORTED   Final    Immature Granulocytes 07/23/2018 NOT REPORTED  0 % Final    Absolute Immature Granulocyte 07/23/2018 NOT REPORTED  0.00 - 0.30 k/uL Final    WBC Morphology 07/23/2018 NOT REPORTED   Final    RBC Morphology 07/23/2018 NOT REPORTED   Final    Platelet Estimate 91/39/7338 NOT REPORTED   Final    Seg Neutrophils 07/23/2018 62  43 - 77 % Final    Lymphocytes 07/23/2018 21  16 - 46 % Final    Monocytes 07/23/2018 12* 4 - 11 % Final    Eosinophils % 07/23/2018 4  1 - 7 % Final    Basophils 07/23/2018 1  0 - 1 % Final    Segs Absolute 07/23/2018 5.30  1.8 - 7.7 k/uL Final    Absolute Lymph # 07/23/2018 1.80  1.0 - 4.8 k/uL Final    Absolute Mono # 07/23/2018 1.00  0.1 - 1.2 k/uL Final    Absolute Eos # 07/23/2018 0.30  0.0 - 0.4 k/uL Final    Basophils # 07/23/2018 0.10  0.0 - 0.2 k/uL Final    Glucose 07/23/2018 103* 70 - 99 mg/dL Final    BUN 07/23/2018 17  8 - 23 mg/dL Final    CREATININE 07/23/2018 0.64  0.50 - 0.90 mg/dL Final    Bun/Cre Ratio 07/23/2018 27* 9 - 20 Final    Calcium 07/23/2018 9.7  8.6 - 10.4 mg/dL Final    Sodium 07/23/2018 142  135 - 144 mmol/L Final    Potassium 07/23/2018 4.3  3.7 - 5.3 mmol/L Final    Chloride 07/23/2018 103  98 - 107 mmol/L Final    CO2 07/23/2018 28  20 - 31 mmol/L Final    Anion Gap 07/23/2018 11  9 - 17 mmol/L Final    Alkaline Phosphatase 07/23/2018 85  35 - 104 U/L Final    ALT 07/23/2018 23  5 - 33 U/L Final    AST 07/23/2018 23  <32 U/L Final    Total Bilirubin 07/23/2018 1.28* 0.3 - 1.2 mg/dL Final    Total Protein 07/23/2018 7.6  6.4 - 8.3 g/dL Final    Alb 07/23/2018 4.1  3.5 - 5.2 g/dL Final    Albumin/Globulin Ratio 07/23/2018 1.2  1.0 - 2.5 Final    GFR Non- 07/23/2018 >60  >60 mL/min Final   

## 2018-08-01 ASSESSMENT — ENCOUNTER SYMPTOMS
TROUBLE SWALLOWING: 0
NAUSEA: 0
SHORTNESS OF BREATH: 0
CONSTIPATION: 0
DIARRHEA: 0
CHEST TIGHTNESS: 0
SORE THROAT: 0
WHEEZING: 0
VOMITING: 0
COUGH: 0

## 2018-09-12 ENCOUNTER — OFFICE VISIT (OUTPATIENT)
Dept: OPTOMETRY | Age: 68
End: 2018-09-12
Payer: MEDICARE

## 2018-09-12 DIAGNOSIS — H52.203 MYOPIA OF BOTH EYES WITH ASTIGMATISM: ICD-10-CM

## 2018-09-12 DIAGNOSIS — H52.13 MYOPIA OF BOTH EYES WITH ASTIGMATISM: ICD-10-CM

## 2018-09-12 DIAGNOSIS — H25.13 NUCLEAR SCLEROSIS OF BOTH EYES: Primary | ICD-10-CM

## 2018-09-12 PROCEDURE — 1090F PRES/ABSN URINE INCON ASSESS: CPT | Performed by: OPTOMETRIST

## 2018-09-12 PROCEDURE — G8417 CALC BMI ABV UP PARAM F/U: HCPCS | Performed by: OPTOMETRIST

## 2018-09-12 PROCEDURE — 1101F PT FALLS ASSESS-DOCD LE1/YR: CPT | Performed by: OPTOMETRIST

## 2018-09-12 PROCEDURE — 1036F TOBACCO NON-USER: CPT | Performed by: OPTOMETRIST

## 2018-09-12 PROCEDURE — 1123F ACP DISCUSS/DSCN MKR DOCD: CPT | Performed by: OPTOMETRIST

## 2018-09-12 PROCEDURE — 99213 OFFICE O/P EST LOW 20 MIN: CPT | Performed by: OPTOMETRIST

## 2018-09-12 PROCEDURE — G8598 ASA/ANTIPLAT THER USED: HCPCS | Performed by: OPTOMETRIST

## 2018-09-12 PROCEDURE — 4040F PNEUMOC VAC/ADMIN/RCVD: CPT | Performed by: OPTOMETRIST

## 2018-09-12 PROCEDURE — G8427 DOCREV CUR MEDS BY ELIG CLIN: HCPCS | Performed by: OPTOMETRIST

## 2018-09-12 PROCEDURE — 3017F COLORECTAL CA SCREEN DOC REV: CPT | Performed by: OPTOMETRIST

## 2018-09-12 PROCEDURE — G8399 PT W/DXA RESULTS DOCUMENT: HCPCS | Performed by: OPTOMETRIST

## 2018-09-12 RX ORDER — ATORVASTATIN CALCIUM 10 MG/1
TABLET, FILM COATED ORAL
Qty: 30 TABLET | Refills: 11 | Status: SHIPPED | OUTPATIENT
Start: 2018-09-12 | End: 2019-10-31 | Stop reason: SDUPTHER

## 2018-09-12 ASSESSMENT — REFRACTION_WEARINGRX
OS_SPHERE: PLANO
SPECS_TYPE: BIFOCAL
OD_SPHERE: PLANO
OD_ADD: +2.25
OS_ADD: +2.25

## 2018-09-12 ASSESSMENT — VISUAL ACUITY
OD_CC: 20/20 OU
OS_CC: 20/25
METHOD: SNELLEN - LINEAR
OS_CC+: -1
CORRECTION_TYPE: CONTACTS

## 2018-09-12 ASSESSMENT — KERATOMETRY
OS_AXISANGLE_DEGREES: 090
OD_K1POWER_DIOPTERS: 44.75
OD_K2POWER_DIOPTERS: 44.75
OS_K1POWER_DIOPTERS: 44.25
OD_AXISANGLE_DEGREES: 090
OS_K2POWER_DIOPTERS: 44.25
OD_AXISANGLE2_DEGREES: 000
OS_AXISANGLE2_DEGREES: 000

## 2018-09-12 ASSESSMENT — REFRACTION_MANIFEST
OS_SPHERE: -4.50
OD_AXIS: 090
OD_SPHERE: -5.00
OD_ADD: +2.25
OS_AXIS: 100
OS_CYLINDER: +0.75
OD_CYLINDER: -0.75
OS_ADD: +2.25

## 2018-09-12 ASSESSMENT — TONOMETRY
OD_IOP_MMHG: 17
OS_IOP_MMHG: 21
IOP_METHOD: NON-CONTACT AIR PUFF

## 2018-09-12 ASSESSMENT — SLIT LAMP EXAM - LIDS
COMMENTS: NORMAL
COMMENTS: NORMAL

## 2018-09-12 ASSESSMENT — REFRACTION_CURRENTRX
OD_SPHERE: -4.75
OD_BRAND: COOPERVISION: BIOFINITY
OS_SPHERE: -5.00
OS_BRAND: COOPERVISION: BIOFINITY

## 2018-09-12 NOTE — PROGRESS NOTES
-4.50 -1.25 108                   Final Rx       Sphere Cylinder Axis Add    Right -5.00 -0.75 090 +2.25    Left -4.50 -0.75 100 +2.25    Expiration Date:  9/12/2020           Contact Lens Current Rx     Current Contact Lens Rx (Ordered)       Brand Sphere    Right Coopervision: Biofinity -4.75    Left Coopervision: Biofinity -5.00                CALL TO ORDER WHEN IN NEED   Final Contact Lens Rx       Brand Sphere    Right Coopervision: Biofinity -4.75    Left Coopervision: Biofinity -5.00    Expiration Date:  9/13/2019    Replacement:  Monthly    Wearing Schedule:  Daily wear    Final  Call to order            Plan:     1. Nuclear sclerosis of both eyes    2.  Myopia of both eyes with astigmatism             Patient Instructions   New contact lenses given ;     Return in about 1 year (around 9/12/2019) for complete eye exam.

## 2018-09-26 ENCOUNTER — HOSPITAL ENCOUNTER (OUTPATIENT)
Dept: MAMMOGRAPHY | Age: 68
Discharge: HOME | End: 2018-09-28
Payer: MEDICARE

## 2018-09-26 DIAGNOSIS — Z12.39 SCREENING FOR BREAST CANCER: ICD-10-CM

## 2018-09-26 PROCEDURE — 77067 SCR MAMMO BI INCL CAD: CPT

## 2018-10-08 DIAGNOSIS — E03.9 ACQUIRED HYPOTHYROIDISM: ICD-10-CM

## 2018-10-09 RX ORDER — LEVOTHYROXINE SODIUM 0.12 MG/1
TABLET ORAL
Qty: 30 TABLET | Refills: 5 | Status: SHIPPED | OUTPATIENT
Start: 2018-10-09 | End: 2019-04-08 | Stop reason: SDUPTHER

## 2018-10-26 ENCOUNTER — HOSPITAL ENCOUNTER (OUTPATIENT)
Dept: LAB | Age: 68
Discharge: HOME | End: 2018-10-26
Payer: MEDICARE

## 2018-10-26 DIAGNOSIS — E03.9 HYPOTHYROIDISM, UNSPECIFIED TYPE: ICD-10-CM

## 2018-10-26 LAB
THYROXINE, FREE: 1.48 NG/DL (ref 0.93–1.7)
TSH SERPL DL<=0.05 MIU/L-ACNC: 1.22 MIU/L (ref 0.3–5)

## 2018-10-26 PROCEDURE — 84439 ASSAY OF FREE THYROXINE: CPT

## 2018-10-26 PROCEDURE — 84443 ASSAY THYROID STIM HORMONE: CPT

## 2018-10-26 PROCEDURE — 36415 COLL VENOUS BLD VENIPUNCTURE: CPT

## 2018-11-12 ENCOUNTER — OFFICE VISIT (OUTPATIENT)
Dept: NEUROLOGY | Age: 68
End: 2018-11-12
Payer: MEDICARE

## 2018-11-12 VITALS
WEIGHT: 182 LBS | OXYGEN SATURATION: 98 % | DIASTOLIC BLOOD PRESSURE: 74 MMHG | SYSTOLIC BLOOD PRESSURE: 128 MMHG | BODY MASS INDEX: 34.36 KG/M2 | HEART RATE: 62 BPM | HEIGHT: 61 IN

## 2018-11-12 DIAGNOSIS — I63.9 CEREBRAL INFARCTION, UNSPECIFIED MECHANISM (HCC): ICD-10-CM

## 2018-11-12 DIAGNOSIS — E03.9 HYPOTHYROIDISM, UNSPECIFIED TYPE: ICD-10-CM

## 2018-11-12 DIAGNOSIS — I63.019 CEREBRAL INFARCTION DUE TO THROMBOSIS OF VERTEBRAL ARTERY, UNSPECIFIED BLOOD VESSEL LATERALITY (HCC): ICD-10-CM

## 2018-11-12 DIAGNOSIS — E78.49 OTHER HYPERLIPIDEMIA: ICD-10-CM

## 2018-11-12 DIAGNOSIS — I63.81 LACUNAR INFARCTION (HCC): ICD-10-CM

## 2018-11-12 DIAGNOSIS — I63.00 CEREBRAL INFARCTION DUE TO THROMBOSIS OF PRECEREBRAL ARTERY (HCC): ICD-10-CM

## 2018-11-12 DIAGNOSIS — G57.10 MERALGIA PARESTHETICA, UNSPECIFIED LATERALITY: ICD-10-CM

## 2018-11-12 DIAGNOSIS — I69.90 LATE EFFECTS OF CVA (CEREBROVASCULAR ACCIDENT): ICD-10-CM

## 2018-11-12 DIAGNOSIS — G45.0 VBI (VERTEBROBASILAR INSUFFICIENCY): ICD-10-CM

## 2018-11-12 DIAGNOSIS — E78.2 MIXED HYPERLIPIDEMIA: ICD-10-CM

## 2018-11-12 DIAGNOSIS — G25.81 RLS (RESTLESS LEGS SYNDROME): ICD-10-CM

## 2018-11-12 DIAGNOSIS — I63.00 CEREBROVASCULAR ACCIDENT (CVA) DUE TO THROMBOSIS OF PRECEREBRAL ARTERY (HCC): Primary | ICD-10-CM

## 2018-11-12 DIAGNOSIS — R42 DIZZINESS: ICD-10-CM

## 2018-11-12 DIAGNOSIS — R41.3 MEMORY LOSS DUE TO MEDICAL CONDITION: ICD-10-CM

## 2018-11-12 DIAGNOSIS — E03.9 ACQUIRED HYPOTHYROIDISM: ICD-10-CM

## 2018-11-12 DIAGNOSIS — G45.9 TIA (TRANSIENT ISCHEMIC ATTACK): ICD-10-CM

## 2018-11-12 PROCEDURE — 1123F ACP DISCUSS/DSCN MKR DOCD: CPT | Performed by: PSYCHIATRY & NEUROLOGY

## 2018-11-12 PROCEDURE — G8417 CALC BMI ABV UP PARAM F/U: HCPCS | Performed by: PSYCHIATRY & NEUROLOGY

## 2018-11-12 PROCEDURE — G8399 PT W/DXA RESULTS DOCUMENT: HCPCS | Performed by: PSYCHIATRY & NEUROLOGY

## 2018-11-12 PROCEDURE — 4040F PNEUMOC VAC/ADMIN/RCVD: CPT | Performed by: PSYCHIATRY & NEUROLOGY

## 2018-11-12 PROCEDURE — G8598 ASA/ANTIPLAT THER USED: HCPCS | Performed by: PSYCHIATRY & NEUROLOGY

## 2018-11-12 PROCEDURE — 1090F PRES/ABSN URINE INCON ASSESS: CPT | Performed by: PSYCHIATRY & NEUROLOGY

## 2018-11-12 PROCEDURE — G8484 FLU IMMUNIZE NO ADMIN: HCPCS | Performed by: PSYCHIATRY & NEUROLOGY

## 2018-11-12 PROCEDURE — 3017F COLORECTAL CA SCREEN DOC REV: CPT | Performed by: PSYCHIATRY & NEUROLOGY

## 2018-11-12 PROCEDURE — 99215 OFFICE O/P EST HI 40 MIN: CPT | Performed by: PSYCHIATRY & NEUROLOGY

## 2018-11-12 PROCEDURE — 1036F TOBACCO NON-USER: CPT | Performed by: PSYCHIATRY & NEUROLOGY

## 2018-11-12 PROCEDURE — 1101F PT FALLS ASSESS-DOCD LE1/YR: CPT | Performed by: PSYCHIATRY & NEUROLOGY

## 2018-11-12 PROCEDURE — G8427 DOCREV CUR MEDS BY ELIG CLIN: HCPCS | Performed by: PSYCHIATRY & NEUROLOGY

## 2018-11-12 RX ORDER — ROPINIROLE 1 MG/1
TABLET, FILM COATED ORAL
Qty: 30 TABLET | Refills: 5 | Status: SHIPPED | OUTPATIENT
Start: 2018-11-12 | End: 2019-07-08 | Stop reason: SDUPTHER

## 2018-11-12 RX ORDER — CLOPIDOGREL BISULFATE 75 MG/1
TABLET ORAL
Qty: 30 TABLET | Refills: 5 | Status: SHIPPED | OUTPATIENT
Start: 2018-11-12 | End: 2019-04-08 | Stop reason: SDUPTHER

## 2018-11-12 ASSESSMENT — ENCOUNTER SYMPTOMS
SHORTNESS OF BREATH: 0
SWOLLEN GLANDS: 0
EYE PAIN: 0
BOWEL INCONTINENCE: 0
DIARRHEA: 0
CHANGE IN BOWEL HABIT: 0
ABDOMINAL PAIN: 0
PHOTOPHOBIA: 0
NAUSEA: 0
CONSTIPATION: 0
COLOR CHANGE: 0
COUGH: 0
BACK PAIN: 0
VISUAL CHANGE: 0
EYE DISCHARGE: 0
BLOOD IN STOOL: 0
TROUBLE SWALLOWING: 0
CHEST TIGHTNESS: 0
VOICE CHANGE: 0
CHOKING: 0
SINUS PRESSURE: 0
FACIAL SWELLING: 0
EYE REDNESS: 0
WHEEZING: 0
SORE THROAT: 0
APNEA: 0
EYE ITCHING: 0
VOMITING: 0
ABDOMINAL DISTENTION: 0

## 2018-11-12 NOTE — PROGRESS NOTES
fatigue, fever, headaches, light-headedness, nausea, neck pain, palpitations, shortness of breath, vertigo or vomiting. Past treatments include nothing. The treatment provided no relief. Her past medical history is significant for a CVA. There is no history of a bleeding disorder, a clotting disorder, dementia, head trauma, liver disease, mood changes or seizures. History obtained from  The patient   and other  available medical records were  Also  reviewed. 1)    TIA    WITH  MEMORY  LOSS  AND  CONFUSION     IN      2008                      -   RESOLVED          2)   STROKE    IN   January 2014. H/O   INFARCT  IN MEDIAL   TEMPORAL  LOBE               HAD    CONFUSION  AND  MEMORY  LOSS                        FOR   5  HOURS  AT  THAT  TIME. 3)  ON    ASA   AND  PLAVIX    AND  TOLERATING  THE  SAME. NO  RECURRENCE   OF  TIA OR  CVA      4)  RESTLESS  LEG  SYNDROME               FOR  MORE  THAN    30  YEARS           BETTER  CONTROLLED   ON  REQUIP          5)   NO   H/O  CLINICAL  SEIZURE  ACTIVITY. NO  RECURRENCE  OF  MEMORY  LOSS                     OR  CONFUSION. 6)   NO   EVIDENCE  OF  PARKINSONIAN  FEATURES          PATIENT  DENIES  ANY  GAIT  DIFFICULTIES                      OR  BALANCE  PROBLEMS          7)   MERALGIA   PARESTHETICA  ON   THE  LEFT  THIGH                       -   STABLE          8)  MULTIPLE   CO  MORBID  MEDICAL  CONDITIONS. BEING  FOLLOWED  BY  HER  PCP. 9)   MILD  SHORT TERM  MEMORY PROBLEMS                         --  FOR    2  YEARS          PATIENT  NOT  CONCERNED. PATIENT  NOT  INTERESTED  IN  MEDICATION  FOR  DEMENTIA           PATIENT  COULD BE  TRIED  WITH  MEDICATION,           IF  PATIENT  IS  WILLING  FOR  THE  SAME.           10)     H/O    DIZZINESS  IN  THE  PAST                     -   NO  RECURRENCE          11)   PATIENT  AWARE  OF  VARIOUS  RISK daily 30 tablet 11    metoprolol tartrate (LOPRESSOR) 25 MG tablet take 1/2 tablet by mouth twice a day 180 tablet 3    melatonin 5 MG TABS tablet Take 5 mg by mouth nightly      aspirin 81 MG tablet Take 81 mg by mouth daily.  Vitamin D (CHOLECALCIFEROL) 1000 UNITS CAPS capsule Take 3,000 Units by mouth daily       Multiple Vitamin (MULTI-VITAMINS PO) Take 1 tablet by mouth Daily.  fluticasone (FLONASE) 50 MCG/ACT nasal spray 2 sprays to each nostril once daily. 1 Bottle 11     No current facility-administered medications for this visit.               Allergies   Allergen Reactions    Sulfamethoxazole-Trimethoprim Swelling     lips    Penicillins Rash               Family History   Problem Relation Age of Onset   [facility] Stroke Mother     Other Mother          from pneumonia    Liver Disease Father         due to alcohol    Heart Disease Father         valve disease    Diabetes Father     Thyroid Disease Sister     Cancer Sister         breast cancer older sister   [facility] Diabetes Brother     High Cholesterol Sister     Diabetes Brother     Other Paternal Aunt         late onset alzheimers    Glaucoma Neg Hx     Cataracts Neg Hx              Social History     Social History    Marital status:      Spouse name: N/A    Number of children: N/A    Years of education: N/A     Occupational History    retired       Social History Main Topics    Smoking status: Never Smoker    Smokeless tobacco: Never Used    Alcohol use No    Drug use: No    Sexual activity: Not on file     Other Topics Concern    Not on file     Social History Narrative    No narrative on file       Vitals:    18 0850   BP: 128/74   Pulse: 62   SpO2: 98%         Wt Readings from Last 3 Encounters:   18 182 lb (82.6 kg)   18 177 lb (80.3 kg)   18 179 lb (81.2 kg)         BP Readings from Last 3 Encounters:   18 128/74   18 120/60   18 (!) 110/44       Hematology and infarction, unspecified mechanism ([facility] [address] I63.9    11. Late effects of CVA (cerebrovascular accident) I69.90    12. VBI (vertebrobasilar insufficiency) G45.0    13. Dizziness R42    14. Other hyperlipidemia E78.49    15. Acquired hypothyroidism E03.9    16. Cerebral infarction due to thrombosis of precerebral artery (HCC) I63.00               CONCERNS   &   INCREASED   RISK   FOR         * TIA,  CEREBRO  VASCULAR  ISCHEMIA, STROKE       *   COGNITIVE  &   MEMORY PROBLEMS  AND  DEMENTIAS               VARIOUS  RISK   FACTORS   WERE  REVIEWED   AND   DISCUSSED. PLAN:       Beverly Yi  Of  The  Diagnoses,  The  Management & Treatment  Options           AND    Care  plan  Were        Reviewed and   Discussed   With  patient. * Goals  And  Expectations  Of  The  Therapy  Discussed   And  Reviewed. *   Benefits   And   Side  Effect  Profile  Of  Medication/s   Were   Discussed             * Need   For  Further   Follow up For  The  Various  Problems  Were discussed. * Results  Of  The  Previous  Diagnostic tests were reviewed and questions answered. patient  understand the same. Medical  Decision  Making  Was  Made  Based on the   Complexity  Of  Above  Mentioned  Diagnoses,    Data reviewed   & diagnostic  Tests  Reviewed,  Risk  Of  Significant   Co morbidities and complicating   Factors. Medical  Decision  Was   High  Complexity  Due   To  The  Patient's  Multiple  Symptoms,  Advancing   Disease,  Complex  Treatment  Regimen,  Multiple medications and   Risk  Of   Side  Effects,  Difficulty  In  Medication  Management  And  Diagnostic  Challenges   In  View  Of  The  Associated   Co  Morbid  Conditions   And  Problems. * FALL   PRECAUTIONS.       THESE  REVIEWED   AND  DISCUSSED      *   BE  CAREFUL  WITH  ACTIVITIES         *   ADEQUATE   FLUID  INTAKE   AND  ELECTROLYTE  BALANCE         * KEEP  DAIRY  OF   THE  NEUROLOGICAL  SYMPTOMS      RECORDING

## 2019-01-11 ENCOUNTER — HOSPITAL ENCOUNTER (OUTPATIENT)
Dept: INTERVENTIONAL RADIOLOGY/VASCULAR | Age: 69
Discharge: HOME | End: 2019-01-13
Payer: MEDICARE

## 2019-01-11 ENCOUNTER — TELEPHONE (OUTPATIENT)
Dept: OPTOMETRY | Age: 69
End: 2019-01-11

## 2019-01-11 ENCOUNTER — HOSPITAL ENCOUNTER (OUTPATIENT)
Dept: NEUROLOGY | Age: 69
Discharge: HOME | End: 2019-01-11
Payer: MEDICARE

## 2019-01-11 DIAGNOSIS — I63.81 LACUNAR INFARCTION (HCC): ICD-10-CM

## 2019-01-11 DIAGNOSIS — I63.019 CEREBRAL INFARCTION DUE TO THROMBOSIS OF VERTEBRAL ARTERY, UNSPECIFIED BLOOD VESSEL LATERALITY (HCC): ICD-10-CM

## 2019-01-11 DIAGNOSIS — G45.0 VBI (VERTEBROBASILAR INSUFFICIENCY): ICD-10-CM

## 2019-01-11 DIAGNOSIS — I63.00 CEREBRAL INFARCTION DUE TO THROMBOSIS OF PRECEREBRAL ARTERY (HCC): ICD-10-CM

## 2019-01-11 DIAGNOSIS — G45.9 TIA (TRANSIENT ISCHEMIC ATTACK): ICD-10-CM

## 2019-01-11 DIAGNOSIS — R42 DIZZINESS: ICD-10-CM

## 2019-01-11 DIAGNOSIS — I69.90 LATE EFFECTS OF CVA (CEREBROVASCULAR ACCIDENT): ICD-10-CM

## 2019-01-11 DIAGNOSIS — I63.9 CEREBRAL INFARCTION, UNSPECIFIED MECHANISM (HCC): ICD-10-CM

## 2019-01-11 DIAGNOSIS — I63.00 CEREBROVASCULAR ACCIDENT (CVA) DUE TO THROMBOSIS OF PRECEREBRAL ARTERY (HCC): ICD-10-CM

## 2019-01-11 PROCEDURE — 93880 EXTRACRANIAL BILAT STUDY: CPT

## 2019-01-11 PROCEDURE — 93886 INTRACRANIAL COMPLETE STUDY: CPT

## 2019-01-11 PROCEDURE — 93892 TCD EMBOLI DETECT W/O INJ: CPT

## 2019-01-15 ENCOUNTER — OFFICE VISIT (OUTPATIENT)
Dept: CARDIOLOGY | Age: 69
End: 2019-01-15
Payer: MEDICARE

## 2019-01-15 VITALS
BODY MASS INDEX: 34.36 KG/M2 | WEIGHT: 182 LBS | SYSTOLIC BLOOD PRESSURE: 120 MMHG | HEIGHT: 61 IN | HEART RATE: 69 BPM | DIASTOLIC BLOOD PRESSURE: 70 MMHG

## 2019-01-15 DIAGNOSIS — I63.00 CEREBROVASCULAR ACCIDENT (CVA) DUE TO THROMBOSIS OF PRECEREBRAL ARTERY (HCC): ICD-10-CM

## 2019-01-15 DIAGNOSIS — Z01.818 PRE-OP EVALUATION: ICD-10-CM

## 2019-01-15 DIAGNOSIS — E78.2 MIXED HYPERLIPIDEMIA: Primary | ICD-10-CM

## 2019-01-15 DIAGNOSIS — G45.9 TIA (TRANSIENT ISCHEMIC ATTACK): ICD-10-CM

## 2019-01-15 PROCEDURE — 93000 ELECTROCARDIOGRAM COMPLETE: CPT | Performed by: INTERNAL MEDICINE

## 2019-01-15 PROCEDURE — 1101F PT FALLS ASSESS-DOCD LE1/YR: CPT | Performed by: INTERNAL MEDICINE

## 2019-01-15 PROCEDURE — 99213 OFFICE O/P EST LOW 20 MIN: CPT | Performed by: INTERNAL MEDICINE

## 2019-01-15 PROCEDURE — G8399 PT W/DXA RESULTS DOCUMENT: HCPCS | Performed by: INTERNAL MEDICINE

## 2019-01-15 PROCEDURE — 1036F TOBACCO NON-USER: CPT | Performed by: INTERNAL MEDICINE

## 2019-01-15 PROCEDURE — G8427 DOCREV CUR MEDS BY ELIG CLIN: HCPCS | Performed by: INTERNAL MEDICINE

## 2019-01-15 PROCEDURE — 1123F ACP DISCUSS/DSCN MKR DOCD: CPT | Performed by: INTERNAL MEDICINE

## 2019-01-15 PROCEDURE — G8484 FLU IMMUNIZE NO ADMIN: HCPCS | Performed by: INTERNAL MEDICINE

## 2019-01-15 PROCEDURE — G8598 ASA/ANTIPLAT THER USED: HCPCS | Performed by: INTERNAL MEDICINE

## 2019-01-15 PROCEDURE — 4040F PNEUMOC VAC/ADMIN/RCVD: CPT | Performed by: INTERNAL MEDICINE

## 2019-01-15 PROCEDURE — 1090F PRES/ABSN URINE INCON ASSESS: CPT | Performed by: INTERNAL MEDICINE

## 2019-01-15 PROCEDURE — G8417 CALC BMI ABV UP PARAM F/U: HCPCS | Performed by: INTERNAL MEDICINE

## 2019-01-15 PROCEDURE — 3017F COLORECTAL CA SCREEN DOC REV: CPT | Performed by: INTERNAL MEDICINE

## 2019-01-28 ENCOUNTER — OFFICE VISIT (OUTPATIENT)
Dept: FAMILY MEDICINE CLINIC | Age: 69
End: 2019-01-28
Payer: MEDICARE

## 2019-01-28 VITALS
HEIGHT: 61 IN | WEIGHT: 183 LBS | HEART RATE: 78 BPM | BODY MASS INDEX: 34.55 KG/M2 | OXYGEN SATURATION: 96 % | DIASTOLIC BLOOD PRESSURE: 62 MMHG | RESPIRATION RATE: 16 BRPM | SYSTOLIC BLOOD PRESSURE: 114 MMHG

## 2019-01-28 DIAGNOSIS — Z86.73 HISTORY OF CVA (CEREBROVASCULAR ACCIDENT): ICD-10-CM

## 2019-01-28 DIAGNOSIS — G25.81 RLS (RESTLESS LEGS SYNDROME): ICD-10-CM

## 2019-01-28 DIAGNOSIS — Z23 NEED FOR VACCINATION: Primary | ICD-10-CM

## 2019-01-28 DIAGNOSIS — E03.9 ACQUIRED HYPOTHYROIDISM: ICD-10-CM

## 2019-01-28 DIAGNOSIS — E78.00 PURE HYPERCHOLESTEROLEMIA: ICD-10-CM

## 2019-01-28 DIAGNOSIS — Z86.73 HISTORY OF TRANSIENT ISCHEMIC ATTACK (TIA): ICD-10-CM

## 2019-01-28 PROCEDURE — G8399 PT W/DXA RESULTS DOCUMENT: HCPCS | Performed by: PHYSICIAN ASSISTANT

## 2019-01-28 PROCEDURE — 90715 TDAP VACCINE 7 YRS/> IM: CPT | Performed by: PHYSICIAN ASSISTANT

## 2019-01-28 PROCEDURE — 4040F PNEUMOC VAC/ADMIN/RCVD: CPT | Performed by: PHYSICIAN ASSISTANT

## 2019-01-28 PROCEDURE — 1101F PT FALLS ASSESS-DOCD LE1/YR: CPT | Performed by: PHYSICIAN ASSISTANT

## 2019-01-28 PROCEDURE — 3017F COLORECTAL CA SCREEN DOC REV: CPT | Performed by: PHYSICIAN ASSISTANT

## 2019-01-28 PROCEDURE — 1090F PRES/ABSN URINE INCON ASSESS: CPT | Performed by: PHYSICIAN ASSISTANT

## 2019-01-28 PROCEDURE — 1036F TOBACCO NON-USER: CPT | Performed by: PHYSICIAN ASSISTANT

## 2019-01-28 PROCEDURE — 1123F ACP DISCUSS/DSCN MKR DOCD: CPT | Performed by: PHYSICIAN ASSISTANT

## 2019-01-28 PROCEDURE — G8427 DOCREV CUR MEDS BY ELIG CLIN: HCPCS | Performed by: PHYSICIAN ASSISTANT

## 2019-01-28 PROCEDURE — 90471 IMMUNIZATION ADMIN: CPT | Performed by: PHYSICIAN ASSISTANT

## 2019-01-28 PROCEDURE — G8417 CALC BMI ABV UP PARAM F/U: HCPCS | Performed by: PHYSICIAN ASSISTANT

## 2019-01-28 PROCEDURE — G8482 FLU IMMUNIZE ORDER/ADMIN: HCPCS | Performed by: PHYSICIAN ASSISTANT

## 2019-01-28 PROCEDURE — G8598 ASA/ANTIPLAT THER USED: HCPCS | Performed by: PHYSICIAN ASSISTANT

## 2019-01-28 PROCEDURE — 99214 OFFICE O/P EST MOD 30 MIN: CPT | Performed by: PHYSICIAN ASSISTANT

## 2019-01-28 ASSESSMENT — ENCOUNTER SYMPTOMS
DIARRHEA: 0
WHEEZING: 0
COUGH: 1
NAUSEA: 0
RHINORRHEA: 1
SORE THROAT: 1
VOMITING: 0
SHORTNESS OF BREATH: 0

## 2019-01-28 ASSESSMENT — PATIENT HEALTH QUESTIONNAIRE - PHQ9
SUM OF ALL RESPONSES TO PHQ9 QUESTIONS 1 & 2: 0
SUM OF ALL RESPONSES TO PHQ QUESTIONS 1-9: 0
1. LITTLE INTEREST OR PLEASURE IN DOING THINGS: 0
SUM OF ALL RESPONSES TO PHQ QUESTIONS 1-9: 0
2. FEELING DOWN, DEPRESSED OR HOPELESS: 0

## 2019-02-04 ENCOUNTER — OFFICE VISIT (OUTPATIENT)
Dept: FAMILY MEDICINE CLINIC | Age: 69
End: 2019-02-04
Payer: MEDICARE

## 2019-02-04 VITALS
HEIGHT: 61 IN | BODY MASS INDEX: 34.74 KG/M2 | RESPIRATION RATE: 14 BRPM | DIASTOLIC BLOOD PRESSURE: 62 MMHG | TEMPERATURE: 98.3 F | WEIGHT: 184 LBS | SYSTOLIC BLOOD PRESSURE: 104 MMHG | HEART RATE: 88 BPM

## 2019-02-04 DIAGNOSIS — J01.41 ACUTE RECURRENT PANSINUSITIS: Primary | ICD-10-CM

## 2019-02-04 DIAGNOSIS — H66.001 ACUTE SUPPURATIVE OTITIS MEDIA OF RIGHT EAR WITHOUT SPONTANEOUS RUPTURE OF TYMPANIC MEMBRANE, RECURRENCE NOT SPECIFIED: ICD-10-CM

## 2019-02-04 DIAGNOSIS — R05.9 COUGH: ICD-10-CM

## 2019-02-04 DIAGNOSIS — J20.9 ACUTE BRONCHITIS, UNSPECIFIED ORGANISM: ICD-10-CM

## 2019-02-04 PROCEDURE — G8399 PT W/DXA RESULTS DOCUMENT: HCPCS | Performed by: PHYSICIAN ASSISTANT

## 2019-02-04 PROCEDURE — G8427 DOCREV CUR MEDS BY ELIG CLIN: HCPCS | Performed by: PHYSICIAN ASSISTANT

## 2019-02-04 PROCEDURE — 1036F TOBACCO NON-USER: CPT | Performed by: PHYSICIAN ASSISTANT

## 2019-02-04 PROCEDURE — 3017F COLORECTAL CA SCREEN DOC REV: CPT | Performed by: PHYSICIAN ASSISTANT

## 2019-02-04 PROCEDURE — 1101F PT FALLS ASSESS-DOCD LE1/YR: CPT | Performed by: PHYSICIAN ASSISTANT

## 2019-02-04 PROCEDURE — 1090F PRES/ABSN URINE INCON ASSESS: CPT | Performed by: PHYSICIAN ASSISTANT

## 2019-02-04 PROCEDURE — G8482 FLU IMMUNIZE ORDER/ADMIN: HCPCS | Performed by: PHYSICIAN ASSISTANT

## 2019-02-04 PROCEDURE — G8510 SCR DEP NEG, NO PLAN REQD: HCPCS | Performed by: PHYSICIAN ASSISTANT

## 2019-02-04 PROCEDURE — G8598 ASA/ANTIPLAT THER USED: HCPCS | Performed by: PHYSICIAN ASSISTANT

## 2019-02-04 PROCEDURE — 4040F PNEUMOC VAC/ADMIN/RCVD: CPT | Performed by: PHYSICIAN ASSISTANT

## 2019-02-04 PROCEDURE — 99213 OFFICE O/P EST LOW 20 MIN: CPT | Performed by: PHYSICIAN ASSISTANT

## 2019-02-04 PROCEDURE — 1123F ACP DISCUSS/DSCN MKR DOCD: CPT | Performed by: PHYSICIAN ASSISTANT

## 2019-02-04 PROCEDURE — G8417 CALC BMI ABV UP PARAM F/U: HCPCS | Performed by: PHYSICIAN ASSISTANT

## 2019-02-04 RX ORDER — BENZONATATE 200 MG/1
200 CAPSULE ORAL 3 TIMES DAILY PRN
Qty: 30 CAPSULE | Refills: 1 | Status: SHIPPED | OUTPATIENT
Start: 2019-02-04 | End: 2019-02-11

## 2019-02-04 RX ORDER — PREDNISONE 10 MG/1
10 TABLET ORAL 2 TIMES DAILY
Qty: 10 TABLET | Refills: 0 | Status: SHIPPED | OUTPATIENT
Start: 2019-02-04 | End: 2019-02-09

## 2019-02-04 RX ORDER — AZITHROMYCIN 250 MG/1
250 TABLET, FILM COATED ORAL SEE ADMIN INSTRUCTIONS
Qty: 6 TABLET | Refills: 0 | Status: SHIPPED | OUTPATIENT
Start: 2019-02-04 | End: 2019-02-09

## 2019-02-04 ASSESSMENT — ENCOUNTER SYMPTOMS
WHEEZING: 1
SINUS PAIN: 1
GASTROINTESTINAL NEGATIVE: 1
SHORTNESS OF BREATH: 0
SORE THROAT: 0
SINUS PRESSURE: 1
RHINORRHEA: 1
COUGH: 1

## 2019-02-04 ASSESSMENT — PATIENT HEALTH QUESTIONNAIRE - PHQ9
SUM OF ALL RESPONSES TO PHQ QUESTIONS 1-9: 0
2. FEELING DOWN, DEPRESSED OR HOPELESS: 0
SUM OF ALL RESPONSES TO PHQ QUESTIONS 1-9: 0
1. LITTLE INTEREST OR PLEASURE IN DOING THINGS: 0
SUM OF ALL RESPONSES TO PHQ9 QUESTIONS 1 & 2: 0

## 2019-02-05 ENCOUNTER — HOSPITAL ENCOUNTER (OUTPATIENT)
Dept: LAB | Age: 69
Discharge: HOME | End: 2019-02-05
Payer: MEDICARE

## 2019-02-05 DIAGNOSIS — E78.49 OTHER HYPERLIPIDEMIA: ICD-10-CM

## 2019-02-05 DIAGNOSIS — R73.09 ELEVATED GLUCOSE: ICD-10-CM

## 2019-02-05 DIAGNOSIS — E03.9 HYPOTHYROIDISM, UNSPECIFIED TYPE: ICD-10-CM

## 2019-02-05 LAB
ALBUMIN SERPL-MCNC: 4.4 G/DL (ref 3.5–5.2)
ALBUMIN/GLOBULIN RATIO: 1.4 (ref 1–2.5)
ALP BLD-CCNC: 101 U/L (ref 35–104)
ALT SERPL-CCNC: 24 U/L (ref 5–33)
ANION GAP SERPL CALCULATED.3IONS-SCNC: 13 MMOL/L (ref 9–17)
AST SERPL-CCNC: 22 U/L
BILIRUB SERPL-MCNC: 1.1 MG/DL (ref 0.3–1.2)
BUN BLDV-MCNC: 16 MG/DL (ref 8–23)
BUN/CREAT BLD: 23 (ref 9–20)
CALCIUM SERPL-MCNC: 9.6 MG/DL (ref 8.6–10.4)
CHLORIDE BLD-SCNC: 106 MMOL/L (ref 98–107)
CHOLESTEROL/HDL RATIO: 3.2
CHOLESTEROL: 166 MG/DL
CO2: 28 MMOL/L (ref 20–31)
CREAT SERPL-MCNC: 0.7 MG/DL (ref 0.5–0.9)
ESTIMATED AVERAGE GLUCOSE: 114 MG/DL
GFR AFRICAN AMERICAN: >60 ML/MIN
GFR NON-AFRICAN AMERICAN: >60 ML/MIN
GFR SERPL CREATININE-BSD FRML MDRD: ABNORMAL ML/MIN/{1.73_M2}
GFR SERPL CREATININE-BSD FRML MDRD: ABNORMAL ML/MIN/{1.73_M2}
GLUCOSE BLD-MCNC: 106 MG/DL (ref 70–99)
HBA1C MFR BLD: 5.6 % (ref 4.8–5.9)
HDLC SERPL-MCNC: 52 MG/DL
LDL CHOLESTEROL: 88 MG/DL (ref 0–130)
POTASSIUM SERPL-SCNC: 4.1 MMOL/L (ref 3.7–5.3)
SODIUM BLD-SCNC: 147 MMOL/L (ref 135–144)
TOTAL PROTEIN: 7.5 G/DL (ref 6.4–8.3)
TRIGL SERPL-MCNC: 130 MG/DL
VLDLC SERPL CALC-MCNC: NORMAL MG/DL (ref 1–30)

## 2019-02-05 PROCEDURE — 83036 HEMOGLOBIN GLYCOSYLATED A1C: CPT

## 2019-02-05 PROCEDURE — 80053 COMPREHEN METABOLIC PANEL: CPT

## 2019-02-05 PROCEDURE — 36415 COLL VENOUS BLD VENIPUNCTURE: CPT

## 2019-02-05 PROCEDURE — 80061 LIPID PANEL: CPT

## 2019-04-08 DIAGNOSIS — E03.9 ACQUIRED HYPOTHYROIDISM: ICD-10-CM

## 2019-04-08 RX ORDER — CLOPIDOGREL BISULFATE 75 MG/1
TABLET ORAL
Qty: 90 TABLET | Refills: 1 | Status: SHIPPED | OUTPATIENT
Start: 2019-04-08 | End: 2019-11-19 | Stop reason: SDUPTHER

## 2019-04-08 RX ORDER — LEVOTHYROXINE SODIUM 0.12 MG/1
TABLET ORAL
Qty: 30 TABLET | Refills: 5 | Status: SHIPPED | OUTPATIENT
Start: 2019-04-08 | End: 2019-10-31 | Stop reason: SDUPTHER

## 2019-04-08 NOTE — TELEPHONE ENCOUNTER
Dr. Floyd Goes is out of the office until April 15,2019. Can you please provide a short term supply of Plavix for this patient. Thanks .

## 2019-05-14 ENCOUNTER — OFFICE VISIT (OUTPATIENT)
Dept: NEUROLOGY | Age: 69
End: 2019-05-14
Payer: MEDICARE

## 2019-05-14 VITALS
DIASTOLIC BLOOD PRESSURE: 80 MMHG | WEIGHT: 183 LBS | HEIGHT: 61 IN | HEART RATE: 70 BPM | BODY MASS INDEX: 34.55 KG/M2 | OXYGEN SATURATION: 95 % | SYSTOLIC BLOOD PRESSURE: 126 MMHG

## 2019-05-14 DIAGNOSIS — G57.10 MERALGIA PARESTHETICA, UNSPECIFIED LATERALITY: ICD-10-CM

## 2019-05-14 DIAGNOSIS — E03.9 ACQUIRED HYPOTHYROIDISM: ICD-10-CM

## 2019-05-14 DIAGNOSIS — R42 DIZZINESS: ICD-10-CM

## 2019-05-14 DIAGNOSIS — G45.0 VBI (VERTEBROBASILAR INSUFFICIENCY): ICD-10-CM

## 2019-05-14 DIAGNOSIS — G45.9 TIA (TRANSIENT ISCHEMIC ATTACK): ICD-10-CM

## 2019-05-14 DIAGNOSIS — I63.81 LACUNAR INFARCTION (HCC): ICD-10-CM

## 2019-05-14 DIAGNOSIS — I63.019 CEREBRAL INFARCTION DUE TO THROMBOSIS OF VERTEBRAL ARTERY, UNSPECIFIED BLOOD VESSEL LATERALITY (HCC): Primary | ICD-10-CM

## 2019-05-14 DIAGNOSIS — I63.00 CEREBROVASCULAR ACCIDENT (CVA) DUE TO THROMBOSIS OF PRECEREBRAL ARTERY (HCC): ICD-10-CM

## 2019-05-14 DIAGNOSIS — I69.90 LATE EFFECTS OF CVA (CEREBROVASCULAR ACCIDENT): ICD-10-CM

## 2019-05-14 DIAGNOSIS — G25.81 RLS (RESTLESS LEGS SYNDROME): ICD-10-CM

## 2019-05-14 DIAGNOSIS — E03.9 HYPOTHYROIDISM, UNSPECIFIED TYPE: ICD-10-CM

## 2019-05-14 DIAGNOSIS — E78.49 OTHER HYPERLIPIDEMIA: ICD-10-CM

## 2019-05-14 DIAGNOSIS — R41.3 MEMORY LOSS DUE TO MEDICAL CONDITION: ICD-10-CM

## 2019-05-14 DIAGNOSIS — E78.2 MIXED HYPERLIPIDEMIA: ICD-10-CM

## 2019-05-14 PROCEDURE — G8417 CALC BMI ABV UP PARAM F/U: HCPCS | Performed by: PSYCHIATRY & NEUROLOGY

## 2019-05-14 PROCEDURE — 1123F ACP DISCUSS/DSCN MKR DOCD: CPT | Performed by: PSYCHIATRY & NEUROLOGY

## 2019-05-14 PROCEDURE — G8399 PT W/DXA RESULTS DOCUMENT: HCPCS | Performed by: PSYCHIATRY & NEUROLOGY

## 2019-05-14 PROCEDURE — 4040F PNEUMOC VAC/ADMIN/RCVD: CPT | Performed by: PSYCHIATRY & NEUROLOGY

## 2019-05-14 PROCEDURE — G8427 DOCREV CUR MEDS BY ELIG CLIN: HCPCS | Performed by: PSYCHIATRY & NEUROLOGY

## 2019-05-14 PROCEDURE — 99215 OFFICE O/P EST HI 40 MIN: CPT | Performed by: PSYCHIATRY & NEUROLOGY

## 2019-05-14 PROCEDURE — 1036F TOBACCO NON-USER: CPT | Performed by: PSYCHIATRY & NEUROLOGY

## 2019-05-14 PROCEDURE — 3017F COLORECTAL CA SCREEN DOC REV: CPT | Performed by: PSYCHIATRY & NEUROLOGY

## 2019-05-14 PROCEDURE — G8598 ASA/ANTIPLAT THER USED: HCPCS | Performed by: PSYCHIATRY & NEUROLOGY

## 2019-05-14 PROCEDURE — 1090F PRES/ABSN URINE INCON ASSESS: CPT | Performed by: PSYCHIATRY & NEUROLOGY

## 2019-05-14 RX ORDER — ROPINIROLE 1 MG/1
TABLET, FILM COATED ORAL
Qty: 30 TABLET | Refills: 5 | Status: CANCELLED | OUTPATIENT
Start: 2019-05-14

## 2019-05-14 ASSESSMENT — ENCOUNTER SYMPTOMS
CONSTIPATION: 0
TROUBLE SWALLOWING: 0
ABDOMINAL PAIN: 0
SWOLLEN GLANDS: 0
SORE THROAT: 0
VISUAL CHANGE: 0
WHEEZING: 0
APNEA: 0
BOWEL INCONTINENCE: 0
CHEST TIGHTNESS: 0
ABDOMINAL DISTENTION: 0
COLOR CHANGE: 0
VOMITING: 0
COUGH: 0
CHOKING: 0
DIARRHEA: 0
CHANGE IN BOWEL HABIT: 0
NAUSEA: 0
EYE DISCHARGE: 0
SHORTNESS OF BREATH: 0
SINUS PRESSURE: 0
BLOOD IN STOOL: 0
EYE ITCHING: 0
FACIAL SWELLING: 0
EYE REDNESS: 0
BACK PAIN: 0
VOICE CHANGE: 0
EYE PAIN: 0
PHOTOPHOBIA: 0

## 2019-05-14 NOTE — PROGRESS NOTES
Subjective:      Patient ID: Tong Baldwin is a 77 y.o. RIGHT   HANDED female. Cerebrovascular Accident   This is a chronic problem. Episode onset: TIA  IN  2008   AND  CVA  IN  2014. The problem occurs rarely. The problem has been resolved. Pertinent negatives include no abdominal pain, anorexia, arthralgias, change in bowel habit, chest pain, chills, congestion, coughing, diaphoresis, fatigue, fever, headaches, joint swelling, myalgias, nausea, neck pain, numbness, rash, sore throat, swollen glands, urinary symptoms, vertigo, visual change, vomiting or weakness. Nothing aggravates the symptoms. Treatments tried: ASA,  PLAVIX. The treatment provided significant relief. Dizziness   This is a chronic problem. Episode onset: MORE  THAN   2 YEARS. The problem occurs intermittently. The problem has been resolved. Pertinent negatives include no abdominal pain, anorexia, arthralgias, change in bowel habit, chest pain, chills, congestion, coughing, diaphoresis, fatigue, fever, headaches, joint swelling, myalgias, nausea, neck pain, numbness, rash, sore throat, swollen glands, urinary symptoms, vertigo, visual change, vomiting or weakness. Nothing aggravates the symptoms. She has tried nothing for the symptoms. The treatment provided significant relief. Neurologic Problem   The patient's pertinent negatives include no altered mental status, clumsiness, focal sensory loss, focal weakness, loss of balance, memory loss, near-syncope, slurred speech, syncope, visual change or weakness. Primary symptoms comment: MILD SHORT  TERM  MEMORY  PROBLEMS  . This is a chronic problem. The current episode started more than 1 year ago. The neurological problem developed insidiously. The problem is unchanged. There was no focality noted. Associated symptoms include dizziness.  Pertinent negatives include no abdominal pain, auditory change, aura, back pain, bladder incontinence, bowel incontinence, chest pain, confusion, diaphoresis, fatigue, fever, headaches, light-headedness, nausea, neck pain, palpitations, shortness of breath, vertigo or vomiting. Past treatments include nothing. The treatment provided no relief. Her past medical history is significant for a CVA. There is no history of a bleeding disorder, a clotting disorder, dementia, head trauma, liver disease, mood changes or seizures. History obtained from  The patient   and other  available medical records were  Also  reviewed. 1)    TIA    WITH  MEMORY  LOSS  AND  CONFUSION     IN      2008                      -   RESOLVED          2)   STROKE    IN   January 2014. H/O   INFARCT  IN MEDIAL   TEMPORAL  LOBE               HAD    CONFUSION  AND  MEMORY  LOSS                        FOR   5  HOURS  AT  THAT  TIME. 3)  ON    ASA   AND  PLAVIX    AND  TOLERATING  THE  SAME. NO  RECURRENCE   OF  TIA OR  CVA          4)  RESTLESS  LEG  SYNDROME               FOR  MORE  THAN    30  YEARS           BETTER  CONTROLLED   ON  REQUIP            5)   NO   H/O  CLINICAL  SEIZURE  ACTIVITY. NO  RECURRENCE  OF  MEMORY  LOSS                     OR  CONFUSION. 6)   NO   EVIDENCE  OF  PARKINSONIAN  FEATURES          PATIENT  DENIES  ANY  GAIT  DIFFICULTIES                      OR  BALANCE  PROBLEMS          7)   MERALGIA   PARESTHETICA  ON   THE  LEFT  THIGH                       -   STABLE          8)  MULTIPLE   CO  MORBID  MEDICAL  CONDITIONS. BEING  FOLLOWED  BY  HER  PCP. 9)   MILD  SHORT TERM  MEMORY PROBLEMS                         --  FOR    2  YEARS                 PATIENT  NOT  CONCERNED. PATIENT  NOT  INTERESTED  IN  MEDICATION  FOR  DEMENTIA           PATIENT  COULD BE  TRIED  WITH  MEDICATION,           IF  PATIENT  IS  WILLING  FOR  THE  SAME.           10)     H/O    DIZZINESS  IN  THE  PAST                     -   NO  RECURRENCE        11)       CAROTID  DOPPLER,  TCD  IN JAN. 2019      SHOWED                            NO  SIGNIFICANT  ABNORMALITIES                 12)   PATIENT  AWARE  OF  VARIOUS  RISK  FACTORS  FOR  TIA  /  STROKE                    SAME  REVIEWED,  DISCUSSED   WITH  PATIENT  IN  DETAIL. 13)    PATIENT  DENIES    ANY  NEW  NEUROLOGICAL   CONCERNS. The  Duration,  Quality,  Severity,  Location,  Timing,  Context,  Modifying  Factors   Of   The   Chief   Complaint   And  Present  Illness   Was   Reviewed   In   Chronological   [facility].              Patient   Indicates   The  Presence   And  The  Absence  Of  The  Following  Associated  And   Additional  Neurological    Symptoms:                                Balance  And coordination problems  absent           Gait problems     absent            Headaches      absent              Migraines           absent           Memory problems        absent           Confusion        absent            Paresthesia numbness          absent           Seizures  And  Starring  Episodes           absent           Syncope,  Near  syncopal episodes         absent           Speech problems           absent             Swallowing  Problems      absent            Dizziness,  Light headedness           absent                        Vertigo        absent             Generalized   Weakness    absent              focal  Weakness     absent             Tremors         absent              Sleep  Problems     absent             History  Of   Recent   Head  Injury     absent             History  Of   Recent  TIA     absent             History  Of   Recent    Stroke     absent             Neck  Pain and  Neck muscle  Spasms  absent             Radiating  down   And   Weakness           absent            Lower back   Pain  And     Spasms  absent            Radiating    Down   And   Weakness          absent                H/O   FALLS        absent               History  Of   Visual  Symptoms    absent metoprolol tartrate (LOPRESSOR) 25 MG tablet Take 1 tablet by mouth 2 times daily 180 tablet 3    rOPINIRole (REQUIP) 1 MG tablet take 1 tablet by mouth at bedtime 30 tablet 5    atorvastatin (LIPITOR) 10 MG tablet take 1 tablet by mouth once daily 30 tablet 11    melatonin 5 MG TABS tablet Take 5 mg by mouth nightly      fluticasone (FLONASE) 50 MCG/ACT nasal spray 2 sprays to each nostril once daily. 1 Bottle 11    aspirin 81 MG tablet Take 81 mg by mouth daily.  Vitamin D (CHOLECALCIFEROL) 1000 UNITS CAPS capsule Take 3,000 Units by mouth daily       Multiple Vitamin (MULTI-VITAMINS PO) Take 1 tablet by mouth Daily. No current facility-administered medications for this visit.               Allergies   Allergen Reactions    Sulfamethoxazole-Trimethoprim Swelling     lips    Penicillins Rash               Family History   Problem Relation Age of Onset   Antonia Márquez Stroke Mother     Other Mother          from pneumonia    Liver Disease Father         due to alcohol    Heart Disease Father         valve disease    Diabetes Father     Thyroid Disease Sister     Cancer Sister         breast cancer older sister   Antonia Márquez Diabetes Brother     High Cholesterol Sister     Diabetes Brother     Other Paternal Aunt         late onset alzheimers    Glaucoma Neg Hx     Cataracts Neg Hx              Social History     Socioeconomic History    Marital status:      Spouse name: Not on file    Number of children: Not on file    Years of education: Not on file    Highest education level: Not on file   Occupational History    Occupation: retired    Social Needs    Financial resource strain: Not on file    Food insecurity:     Worry: Not on file     Inability: Not on file   Boxed needs:     Medical: Not on file     Non-medical: Not on file   Tobacco Use    Smoking status: Never Smoker    Smokeless tobacco: Never Used   Substance and Sexual Activity    Alcohol use: No    Drug use: No    Sexual activity: Not on file   Lifestyle    Physical activity:     Days per week: Not on file     Minutes per session: Not on file    Stress: Not on file   Relationships    Social connections:     Talks on phone: Not on file     Gets together: Not on file     Attends Protestant service: Not on file     Active member of club or organization: Not on file     Attends meetings of clubs or organizations: Not on file     Relationship status: Not on file    Intimate partner violence:     Fear of current or ex partner: Not on file     Emotionally abused: Not on file     Physically abused: Not on file     Forced sexual activity: Not on file   Other Topics Concern    Not on file   Social History Narrative    Not on file       Vitals:    05/14/19 0912   BP: 126/80   Pulse: 70   SpO2: 95%         Wt Readings from Last 3 Encounters:   05/14/19 183 lb (83 kg)   02/04/19 184 lb (83.5 kg)   01/28/19 183 lb (83 kg)         BP Readings from Last 3 Encounters:   05/14/19 126/80   02/04/19 104/62   01/28/19 114/62       Hematology and Coagulation  Lab Results   Component Value Date    WBC 8.6 07/23/2018    RBC 4.60 07/23/2018    HGB 13.3 07/23/2018    HCT 39.6 07/23/2018    MCV 86.2 07/23/2018    MCH 28.9 07/23/2018    MCHC 33.6 07/23/2018    RDW 14.3 07/23/2018     07/23/2018    MPV 8.3 07/23/2018       Chemistries  Lab Results   Component Value Date     02/05/2019    K 4.1 02/05/2019     02/05/2019    CO2 28 02/05/2019    BUN 16 02/05/2019    CREATININE 0.70 02/05/2019    CALCIUM 9.6 02/05/2019    PROT 7.5 02/05/2019    LABALBU 4.4 02/05/2019    BILITOT 1.10 02/05/2019    ALKPHOS 101 02/05/2019    AST 22 02/05/2019    ALT 24 02/05/2019     Lab Results   Component Value Date    ALKPHOS 101 02/05/2019    ALT 24 02/05/2019    AST 22 02/05/2019    PROT 7.5 02/05/2019    BILITOT 1.10 02/05/2019    LABALBU 4.4 02/05/2019     Lab Results   Component Value Date    BUN 16 02/05/2019    CREATININE 0.70 02/05/2019 Lab Results   Component Value Date    CALCIUM 9.6 02/05/2019    MG 2.1 01/06/2014     Lab Results   Component Value Date    AST 22 02/05/2019    ALT 24 02/05/2019       Lab Results   Component Value Date    CKTOTAL 59 11/08/2014                 Review of Systems   Constitutional: Negative for appetite change, chills, diaphoresis, fatigue, fever and unexpected weight change. HENT: Negative for congestion, dental problem, drooling, ear discharge, ear pain, facial swelling, hearing loss, mouth sores, nosebleeds, postnasal drip, sinus pressure, sore throat, tinnitus, trouble swallowing and voice change. Eyes: Negative for photophobia, pain, discharge, redness, itching and visual disturbance. Respiratory: Negative for apnea, cough, choking, chest tightness, shortness of breath and wheezing. Cardiovascular: Negative for chest pain, palpitations, leg swelling and near-syncope. Gastrointestinal: Negative for abdominal distention, abdominal pain, anorexia, blood in stool, bowel incontinence, change in bowel habit, constipation, diarrhea, nausea and vomiting. Endocrine: Negative for cold intolerance, heat intolerance, polydipsia, polyphagia and polyuria. Genitourinary: Negative for bladder incontinence. Musculoskeletal: Negative for arthralgias, back pain, gait problem, joint swelling, myalgias, neck pain and neck stiffness. Skin: Negative for color change, pallor, rash and wound. Allergic/Immunologic: Negative for environmental allergies, food allergies and immunocompromised state. Neurological: Positive for dizziness. Negative for vertigo, tremors, focal weakness, seizures, syncope, facial asymmetry, speech difficulty, weakness, light-headedness, numbness, headaches and loss of balance. Hematological: Negative for adenopathy. Does not bruise/bleed easily. Psychiatric/Behavioral: Positive for decreased concentration.  Negative for agitation, behavioral problems, confusion, dysphoric mood, hallucinations, memory loss, self-injury, sleep disturbance and suicidal ideas. The patient is not nervous/anxious and is not hyperactive. Objective:   Physical Exam   Constitutional: She appears well-developed and well-nourished. She is cooperative. HENT:   Head: Normocephalic and atraumatic. Head is without raccoon's eyes and without Abernathy's sign. Right Ear: External ear normal.   Left Ear: External ear normal.   Nose: Nose normal.   Mouth/Throat: Oropharynx is clear and moist.   Eyes: Pupils are equal, round, and reactive to light. Conjunctivae and EOM are normal.   Neck: Normal range of motion. Neck supple. No muscular tenderness present. Carotid bruit is not present. No neck rigidity. No tracheal deviation and normal range of motion present. No Brudzinski's sign and no Kernig's sign noted. No thyroid mass and no thyromegaly present. Cardiovascular: Normal rate and regular rhythm. Pulmonary/Chest: Effort normal.   Musculoskeletal: Normal range of motion. She exhibits no edema or tenderness. Skin: Skin is warm. No rash noted. No cyanosis or erythema. No pallor. Nails show no clubbing. Psychiatric: Her mood appears not anxious. Her affect is not blunt, not labile and not inappropriate. Her speech is not rapid and/or pressured, not delayed, not tangential and not slurred. She is not agitated, not aggressive, not hyperactive, not slowed, not withdrawn, not actively hallucinating and not combative. Thought content is not paranoid and not delusional. Cognition and memory are not impaired. She does not express impulsivity or inappropriate judgment. She does not exhibit a depressed mood. She expresses no homicidal and no suicidal ideation. She expresses no suicidal plans and no homicidal plans. She is communicative. She exhibits normal recent memory and normal remote memory. She is attentive.        NEUROLOGICAL EXAMINATION :    A) MENTAL STATUS:                   Alert and  oriented  To time, place And  Person. No Aphasia. No  Dysarthria. Able   To  Follow three  Step commands without   Any  Difficulty. No right  To left confusion. Normal  Speech  And language function. Insight and  Judgment ,Fund  Of  Knowledge   within normal limits              Recent  And  Remote memory  within normal limits              Attention &Concentration are within normal limits                                                 B) CRANIAL NERVES :             2 CN : Visual  Acuity  And  Visual fields  within normal limits                      Fundi  Could  Not  Be  Could  Not  Be  Evaluated. 3,4,6 CN : Both  Pupils are   Reactive and  Equal.                          Extraocular   Movements  Are  Intact. No  Nystagmus. No  TESSA. No  Afferent  Pupillary  Defect noted. 5 CN :  Normal  Facial sensations and Corneal  Reflexes         7 CN :  Normal  Facial  Symmetry  And  Strength. No facial  Weakness. 8 CN :  Hearing  Appears subnormal        9, 10 CN: Normal spontaneous, reflex palate movements       11 CN:   Normal  Shoulder shrug and  strength       12 CN :   Normal  Tongue movements and  Tongue  In midline                      No tongue   Fasciculations or atrophy     C) MOTOR  EXAM:                 Strength  In upper  And  Lower extremities   within normal limits             No  Drift. No  Atrophy             Rapid alternating  And  repetitions  Movements  within normal limits               Muscle  Tone  In upper  And  Lower  Extremities  within normal limits              No rigidity. No  Spasticity. Bradykinesia   absent               No  Asterixis.             Sustention  Tremor , Resting  Tremor   absent                  No other  Abnormal  Movements noted         D) SENSORY :             light touch, pinprick, position  And  Vibration  subnormal      E) REFLEXES: left internal carotid artery demonstrates the systolic velocities of 63,   94, 90 cm/sec in the proximal, mid and distal segments respectively.       The external carotid artery is patent.  The vertebral artery demonstrates   normal antegrade flow.       No evidence of focal atherosclerotic plaque.       ICA/CCA ratio of 1.0.           Impression   The right internal carotid artery demonstrates 0-50% stenosis .       The left internal carotid artery demonstrates 0-50% stenosis .       Bilateral vertebral arteries are patent with flow in the normal direction.       There is no significant change from prior exam.  No significant plaque   formation is noted. Teresita  is no evidence of hemodynamically significant   stenosis.               Bilateral carotid and vertebral arteries Doppler ultrasound is performed using grayscale, color Doppler and pulse-wave Doppler. Indication: 53-year-old female with a history of cerebral infarction, meralgia paresthetica, memory loss, hyperlipidemia, hypothyroidism, no vertebral basilar insufficiency and history of prior TIAs. Comparison: Prior carotid ultrasound from 09. Findings: Mild intimal thickening and slight mostly non-echogenic plaque identified. Left carotid artery:      1. The maximum peak systolic velocity in the proximal CCA is 92 cm/s, mid 66cm/s and distal 65cm/s. There is no diameter reduction. 2. The maximum peak systolic velocity in the proximal ICA 55cm/s, mid ICA 69 cm/s and distal ICA 114cm/s. There is no diameter reduction at the proximal ICA. 3. The maximum peak systolic velocity in the left external carotid artery is 110 cm/s. ICA/CCA =0.8. Right carotid artery:      1. The maximum peak systolic velocity in the proximal CCA is 93 cm/s, mid 64 cm/s and distal 65 cm/s. There is no diameter reduction at the right carotid bulb.       2. The maximum peak systolic velocity in the proximal WLW49la/s, mid ICA 64 cm/s and distal ICA 68cm/s. There is no diameter reduction at the proximal ICA. 3. The maximum peak systolic velocity in the left external carotid artery is 64 cm/s. ICA/CCA= 1.2. There is evidence of bilateral antegrade flow in the vertebral arteries. Impression: No carotid artery stenosis or significant plaque disease. Stable examination compared to 2009. Final report electronically signed by Kofi Brito M.D. on 12/4/2015        . Plan:          VISITING DIAGNOSIS:      ICD-10-CM    1. 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 RLS (restless legs syndrome) G25.81    3. Meralgia paresthetica, unspecified laterality G57.10    4. Mixed hyperlipidemia E78.2    5. VBI (vertebrobasilar insufficiency) G45.0    6. Late effects of CVA (cerebrovascular accident) I69.90    7. Dizziness R42    8. Memory loss due to medical condition R41.3    9. Cerebrovascular accident (CVA) due to thrombosis of precerebral artery ([address] I63.00    10. Acquired hypothyroidism E03.9    11. Lacunar infarction I63.81    12. Other hyperlipidemia E78.49    13. TIA (transient ischemic attack) G45.9    14. Hypothyroidism, unspecified type E03.9               CONCERNS   &   INCREASED   RISK   FOR         * TIA,  CEREBRO  VASCULAR  ISCHEMIA, STROKE       *   COGNITIVE  &   MEMORY PROBLEMS  AND  DEMENTIA                 VARIOUS  RISK   FACTORS   WERE  REVIEWED   AND   DISCUSSED. PLAN:       Dru Madrigal  Of  The  Diagnoses,  The  Management & Treatment  Options           AND    Care  plan  Were        Reviewed and   Discussed   With  patient. * Goals  And  Expectations  Of  The  Therapy  Discussed   And  Reviewed. *   Benefits   And   Side  Effect  Profile  Of  Medication/s   Were   Discussed             * Need   For  Further   Follow up For  The  Various  Problems  Were discussed.        * Results  Of  The  Previous  Diagnostic tests were reviewed and questions answered. patient  understand the same. Medical  Decision  Making  Was  Made  Based on the   Complexity  Of  Above  Mentioned  Diagnoses,    Data reviewed   & diagnostic  Tests  Reviewed,  Risk  Of  Significant   Co morbidities and complicating   Factors. Medical  Decision  Was   High  Complexity  Due   To  The  Patient's  Multiple  Symptoms,  Advancing   Disease,  Complex  Treatment  Regimen,  Multiple medications and   Risk  Of   Side  Effects,  Difficulty  In  Medication  Management  And  Diagnostic  Challenges   In  View  Of  The  Associated   Co  Morbid  Conditions   And  Problems. * FALL   PRECAUTIONS. *   BE  CAREFUL  WITH  ACTIVITIES         *   ADEQUATE   FLUID  INTAKE   AND  ELECTROLYTE  BALANCE         * KEEP  DAIRY  OF   THE  NEUROLOGICAL  SYMPTOMS        RECORDING THE    DURATION  AND  FREQUENCY. *  AVOID    CONDITIONS  AND  FACTORS   THAT  MAKE   NEUROLOGICAL  SYMPTOMS  WORSE. *  TO  MAINTAIN  REGULAR  SLEEP  WAKE  CYCLES. *   TO  HAVE  ADEQUATE  REST  AND   SLEEP    HOURS.          *    TO   AVOID   TO  SLEEP  IN   SUPINE  POSITION. *      WEIGHT   LOSS. *    AVOID  ANY USAGE OF                   TOBACCO,  EXCESSIVE  ALCOHOL  AND   ILLEGAL   SUBSTANCES      *  CONTINUE MEDICATIONS PRESCRIBED BY NEUROLOGIST AS    RECOMMENDED     *   Compliance   With  Medications   And  Instructions      * CURRENTLY  TOLERATING  THE  PRESCRIBED   MEDICATIONS. WITHOUT  ANY  SIGNIFICANT  SIDE  EFFECTS   &  GETTING BENEFIT. *  MILD  SHORT TERM  MEMORY PROBLEMS    FOR    2  YEARS              PATIENT  NOT  CONCERNED. IF  THERE  IS  ANY  CONCERN   PATIENT  COULD BE  TRIED  WITH  MEDICATION,           IF  PATIENT  IS  WILLING  FOR  THE  SAME. * PATIENT  AWARE  OF  VARIOUS  RISK  FACTORS  FOR  TIA  /  STROKE                    SAME  REVIEWED,  DISCUSSED   WITH  PATIENT  IN  DETAIL. *  May   Use  Pill  Box,    If  Needed      *    To  Continue  The    Antiplatelet  therapy    As   Recommended  Was   Discussed      *    Prophylactic  Use   Of     Vitamin   B   Complex,  Folic  Acid,    Vitamin  B12    Multivitamin   Tablet  Daily    Supplementations   Over  The  Counter  Discussed       *  PATIENT  IS  ALSO   COUNSELED   TO  KEEP    ACTIVITIES       A)   SIMPLE      B)  ORGANIZED      C)  WRITE   DOWN                          *PATIENT   TO  FOLLOW  UP  WITH   PRIMARY  CARE   AND   OTHER  CONSULTANTS  AS  BEFORE. *  Maintain   Healthy  Life Style    With   Periodic  Monitoring  Of    Any  Medical  Conditions  Including   Elevated  Blood  Pressure,  Lipid  Profile,   Blood  Sugar levels  And   Heart  Disease. *   Period   Screening  For  Cancers  Involving  Breast,  Colon,  Prostate, lungs  And  Other  Organs  As  Applicable,  In consultation   With  Your  Primary Care Providers. * Second  Neurological  Opinion  And  Evaluations  In  [facility]  Setting  If  Patient  Is  Interested. *  If  The  Patient remains  Neurologically  Stable   Return   To  [facility] Neurology Department       IN     6 MONTHS  TIME   FOR  FURTHER  FOLLOW UP.                 *  If   There is  Any  Significant  Worsening   Of  Current  Symptoms  And  Or  If patient  Develops   Any additional  New  Neurological  Symptoms  Or  Significant  Concerns   Should  Call  911 or  Go  To  Emergency  Department  For  Further  Immediate  Evaluation. *   The  Neurological   Findings,  Possible  Diagnosis,  Differential diagnoses   And  Options  For    Further   Investigations   And  management   Are  Discussed  Comprehensively. Medications   And  Prescription   Risks  And  Side effects  Are   Also  Discussed. The  Above  Were  Reviewed  With  patient and     questions  Answered  In  Detail.                  More   Than   50% of face  To face Time   Was  Spent  On  Counseling   And   Coordination  Of  Care   Of   Patient's multiple   Neurological  Problems   And   Comorbid  Medical   Conditions. Electronically signed by Luis Tran MD   Board Certified in  Neurology &  In  [facility] ([employer])      DISCLAIMER:   Although every effort was made to ensure the accuracy of this  electronic transcription, some errors in transcription may have occurred. GENERAL PATIENT INSTRUCTIONS:     A Healthy Lifestyle: Care Instructions  Your Care Instructions  A healthy lifestyle can help you feel good, stay at a healthy weight, and have plenty of energy for both work and play. A healthy lifestyle is something you can share with your whole family. A healthy lifestyle also can lower your risk for serious health problems, such as high blood pressure, heart disease, and diabetes. You can follow a few steps listed below to improve your health and the health of your family. Follow-up care is a key part of your treatment and safety. Be sure to make and go to all appointments, and call your doctor if you are having problems. Its also a good idea to know your test results and keep a list of the medicines you take. How can you care for yourself at home? Do not eat too much sugar, fat, or fast foods. You can still have dessert and treats now and then. The goal is moderation. Start small to improve your eating habits. Pay attention to portion sizes, drink less juice and soda pop, and eat more fruits and vegetables. Eat a healthy amount of food. A 3-ounce serving of meat, for example, is about the size of a deck of cards. Fill the rest of your plate with vegetables and whole grains. Limit the amount of soda and sports drinks you have every day. Drink more water when you are thirsty. Eat at least 5 servings of fruits and vegetables every day.  It may seem like a lot, but it is not hard to reach this goal. A serving or helping is 1 piece of fruit, 1 cup of vegetables, or 2 cups of leafy, raw vegetables. Have an apple or some carrot sticks as an afternoon snack instead of a candy bar. Try to have fruits and/or vegetables at every meal.  Make exercise part of your daily routine. You may want to start with simple activities, such as walking, bicycling, or slow swimming. Try to be active 30 to 60 minutes every day. You do not need to do all 30 to 60 minutes all at once. For example, you can exercise 3 times a day for 10 or 20 minutes. Moderate exercise is safe for most people, but it is always a good idea to talk to your doctor before starting an exercise program.  Keep moving. Sharlynn Fothergill the lawn, work in the garden, or [employer]. Take the stairs instead of the elevator at work. If you smoke, quit. People who smoke have an increased risk for heart attack, stroke, cancer, and other lung illnesses. Quitting is hard, but there are ways to boost your chance of quitting tobacco for good. Use nicotine gum, patches, or lozenges. Ask your doctor about stop-smoking programs and medicines. Keep trying. In addition to reducing your risk of diseases in the future, you will notice some benefits soon after you stop using tobacco. If you have shortness of breath or asthma symptoms, they will likely get better within a few weeks after you quit. Limit how much alcohol you drink. Moderate amounts of alcohol (up to 2 drinks a day for men, 1 drink a day for women) are okay. But drinking too much can lead to liver problems, high blood pressure, and other health problems. Family health  If you have a family, there are many things you can do together to improve your health. Eat meals together as a family as often as possible. Eat healthy foods. This includes fruits, vegetables, lean meats and dairy, and whole grains. Include your family in your fitness plan.  Most people think of activities such as jogging or tennis as the way to fitness, but there are many ways you and your family can be more active. Anything that makes you breathe hard and gets your heart pumping is exercise. Here are some tips:  Walk to do errands or to take your child to school or the bus. Go for a family bike ride after dinner instead of watching TV. Where can you learn more? Go to https://Field Nationpepiceweb.[employer]eg[employer]. org and sign in to your [employer] account. Enter M352 in the [employer] box to learn more about \"A Healthy Lifestyle: Care Instructions. \"     If you do not have an account, please click on the \"Sign Up Now\" link. Current as of: July 26, 2016  Content Version: 11.2  © 2956-5991 [employer]. Care instructions adapted under license by [facility] ([facility] [place] If you have questions about a medical condition or this instruction, always ask your healthcare professional. [address] any warranty or liability for your use of this information.

## 2019-07-01 ENCOUNTER — TELEPHONE (OUTPATIENT)
Dept: OPTOMETRY | Age: 69
End: 2019-07-01

## 2019-07-08 RX ORDER — ROPINIROLE 1 MG/1
TABLET, FILM COATED ORAL
Qty: 30 TABLET | Refills: 5 | Status: SHIPPED | OUTPATIENT
Start: 2019-07-08 | End: 2019-12-19 | Stop reason: SDUPTHER

## 2019-07-31 ENCOUNTER — OFFICE VISIT (OUTPATIENT)
Dept: FAMILY MEDICINE CLINIC | Age: 69
End: 2019-07-31
Payer: MEDICARE

## 2019-07-31 VITALS
HEART RATE: 71 BPM | RESPIRATION RATE: 16 BRPM | WEIGHT: 179 LBS | OXYGEN SATURATION: 98 % | HEIGHT: 61 IN | SYSTOLIC BLOOD PRESSURE: 114 MMHG | DIASTOLIC BLOOD PRESSURE: 64 MMHG | TEMPERATURE: 98.4 F | BODY MASS INDEX: 33.79 KG/M2

## 2019-07-31 DIAGNOSIS — R73.01 IFG (IMPAIRED FASTING GLUCOSE): ICD-10-CM

## 2019-07-31 DIAGNOSIS — Z00.00 ROUTINE GENERAL MEDICAL EXAMINATION AT A HEALTH CARE FACILITY: Primary | ICD-10-CM

## 2019-07-31 DIAGNOSIS — E78.00 PURE HYPERCHOLESTEROLEMIA: ICD-10-CM

## 2019-07-31 DIAGNOSIS — Z12.11 SCREEN FOR COLON CANCER: ICD-10-CM

## 2019-07-31 DIAGNOSIS — E03.9 ACQUIRED HYPOTHYROIDISM: ICD-10-CM

## 2019-07-31 DIAGNOSIS — E55.9 VITAMIN D DEFICIENCY: ICD-10-CM

## 2019-07-31 DIAGNOSIS — I63.9 CEREBRAL INFARCTION, UNSPECIFIED MECHANISM (HCC): ICD-10-CM

## 2019-07-31 DIAGNOSIS — G45.9 TIA (TRANSIENT ISCHEMIC ATTACK): ICD-10-CM

## 2019-07-31 PROCEDURE — G8598 ASA/ANTIPLAT THER USED: HCPCS | Performed by: PHYSICIAN ASSISTANT

## 2019-07-31 PROCEDURE — 3017F COLORECTAL CA SCREEN DOC REV: CPT | Performed by: PHYSICIAN ASSISTANT

## 2019-07-31 PROCEDURE — G0438 PPPS, INITIAL VISIT: HCPCS | Performed by: PHYSICIAN ASSISTANT

## 2019-07-31 PROCEDURE — 1123F ACP DISCUSS/DSCN MKR DOCD: CPT | Performed by: PHYSICIAN ASSISTANT

## 2019-07-31 PROCEDURE — 4040F PNEUMOC VAC/ADMIN/RCVD: CPT | Performed by: PHYSICIAN ASSISTANT

## 2019-07-31 ASSESSMENT — PATIENT HEALTH QUESTIONNAIRE - PHQ9
SUM OF ALL RESPONSES TO PHQ QUESTIONS 1-9: 0
SUM OF ALL RESPONSES TO PHQ9 QUESTIONS 1 & 2: 0
1. LITTLE INTEREST OR PLEASURE IN DOING THINGS: 0
SUM OF ALL RESPONSES TO PHQ QUESTIONS 1-9: 0
2. FEELING DOWN, DEPRESSED OR HOPELESS: 0

## 2019-07-31 ASSESSMENT — ENCOUNTER SYMPTOMS
SHORTNESS OF BREATH: 0
NAUSEA: 0
COLOR CHANGE: 1
COUGH: 0
VOMITING: 0
WHEEZING: 0
DIARRHEA: 0

## 2019-07-31 ASSESSMENT — LIFESTYLE VARIABLES: HOW OFTEN DO YOU HAVE A DRINK CONTAINING ALCOHOL: 0

## 2019-07-31 NOTE — PROGRESS NOTES
[facility]    Subjective:      Patient ID: Lachelle Madrigal is a 68 y.o. y.o. female. Patient is seen for Medicare wellness and six month follow up on HTN and other health issues. No recent illness is doing really well. Her left wrist hurt last week ulnar aspect after pulling weeds no problems now. She still walks as much as she can. Denies chest pain shortness of breath or any neurologic symptoms. Follows regularly every six months with neurology and cardiology.           Past Medical History:   Diagnosis Date    Acute confusion     CVA (cerebral infarction)     Hyperlipidemia     Hypothyroidism     [facility]unar infarction ([facility] [address]     Memory loss due to medical condition     Meralgia paresthetica     left side    Myopia with astigmatism and presbyopia     Perennial allergic rhinitis     RLS (restless legs syndrome)     TIA (transient ischemic attack) , 2014    with brief confusion    VBI (vertebrobasilar insufficiency)        Past Surgical History:   Procedure Laterality Date    CARDIAC CATHETERIZATION  2014    [facility] cath    COLONOSCOPY  2015    mild diverticulosis:  Kari BERNARD    INSERTABLE CARDIAC MONITOR  16    removed    PRE-MALIGNANT / BENIGN SKIN LESION EXCISION      hemangioma removal on right foot 2nd toe    WISDOM TOOTH EXTRACTION         Family History   Problem Relation Age of Onset    Stroke Mother     Other Mother          from pneumonia    Liver Disease Father         due to alcohol    Heart Disease Father         valve disease    Diabetes Father     Thyroid Disease Sister     Cancer Sister         breast cancer older sister    Diabetes Brother     High Cholesterol Sister     Diabetes Brother     Other Paternal Aunt         late onset alzheimers    Glaucoma Neg Hx     Cataracts Neg Hx        Allergies   Allergen Reactions    Sulfamethoxazole-Trimethoprim Swelling     lips    Penicillins Rash Current Outpatient Medications   Medication Sig Dispense Refill    rOPINIRole (REQUIP) 1 MG tablet take 1 tablet by mouth at bedtime 30 tablet 5    clopidogrel (PLAVIX) 75 MG tablet take 1 tablet by mouth once daily 90 tablet 1    levothyroxine (SYNTHROID) 125 MCG tablet take 1 tablet by mouth once daily 30 tablet 5    metoprolol tartrate (LOPRESSOR) 25 MG tablet Take 1 tablet by mouth 2 times daily 180 tablet 3    atorvastatin (LIPITOR) 10 MG tablet take 1 tablet by mouth once daily 30 tablet 11    melatonin 5 MG TABS tablet Take 5 mg by mouth nightly      fluticasone (FLONASE) 50 MCG/ACT nasal spray 2 sprays to each nostril once daily. 1 Bottle 11    aspirin 81 MG tablet Take 81 mg by mouth daily.  Vitamin D (CHOLECALCIFEROL) 1000 UNITS CAPS capsule Take 3,000 Units by mouth daily       Multiple Vitamin (MULTI-VITAMINS PO) Take 1 tablet by mouth Daily. No current facility-administered medications for this visit. Review of Systems   Constitutional: Negative for activity change, appetite change, chills, fatigue and fever. HENT: Negative for congestion, nosebleeds, rhinorrhea, sinus pressure, sinus pain, sneezing, sore throat and trouble swallowing. Eyes: Negative for visual disturbance. ANGELA 9/18 and already scheduled with Dr. Kate Sanderson this year. Wears glasses. Respiratory: Negative for cough, chest tightness, shortness of breath and wheezing. Cardiovascular: Negative for chest pain, palpitations and leg swelling. Gastrointestinal: Negative for diarrhea, nausea and vomiting. Genitourinary: Negative for difficulty urinating. Musculoskeletal: Negative for arthralgias, back pain and myalgias. Skin: Positive for color change. Negative for rash. Has redness under breasts. Using lotrisone prn. This occurs occasionally. Neurological: Negative for dizziness, tremors, seizures, syncope, speech difficulty, weakness, light-headedness, numbness and headaches.  Total Protein 2019 7.5  6.4 - 8.3 g/dL Final    Alb 2019 4.4  3.5 - 5.2 g/dL Final    Albumin/Globulin Ratio 2019 1.4  1.0 - 2.5 Final    GFR Non- 2019 >60  >60 mL/min Final    GFR  2019 >60  >60 mL/min Final    GFR Comment 2019        Final    Comment: Average GFR for 79or more years old:   76 mL/min/1.73sq m  Chronic Kidney Disease:   <60 mL/min/1.73sq m  Kidney failure:   <15 mL/min/1.73sq m              eGFR calculated using average adult body mass. Additional eGFR calculator   available at:        [email or web address]            GFR Staging 2019 NOT REPORTED   Final         Assessment & Plan:      Diagnosis Orders   1. Routine general medical examination at a health care facility     2. Screen for colon cancer  Blood Occult Screening FIT   3. Acquired hypothyroidism     4. Pure hypercholesterolemia     5. TIA (transient ischemic attack)     6. Cerebral infarction, unspecified mechanism ([address]         Mammogram   dexa   Does aerobics at [facility] 3 days a week. Stay active  All fasting labs due  annually since have been very stable  Answered her questions  Injury prevention  Ice heat biofreeze to left forearm and wrist Tiera Rosado  2019 11:28 AM    (Pleasenote that portions of this note were completed with a voice recognition program.Efforts were made to edit the dictations but occasionally words are mis-transcribed.)        Medicare Annual Wellness Visit  Name: Magdalene Weber Date: 2019   MRN: [id number] Sex: Female   Age: 68 y.o. Ethnicity: Non-/Non    : 1950 Race: Raimundo Matos is here for Other (6 month follow up )    Screenings for behavioral, psychosocial and functional/safety risks, and cognitive dysfunction are all negative except as indicated below.  These results, as well as other patient data from the Health Risk Assessment form, are documented in Flowsheets linked to this Encounter. Allergies   Allergen Reactions    Sulfamethoxazole-Trimethoprim Swelling     lips    Penicillins Rash     Prior to Visit Medications    Medication Sig Taking? Authorizing Provider   rOPINIRole (REQUIP) 1 MG tablet take 1 tablet by mouth at bedtime Yes Jeri Zaidi MD   clopidogrel (PLAVIX) 75 MG tablet take 1 tablet by mouth once daily Yes ARCENIO Valentin   levothyroxine (SYNTHROID) 125 MCG tablet take 1 tablet by mouth once daily Yes ARCENIO Valentin   metoprolol tartrate (LOPRESSOR) 25 MG tablet Take 1 tablet by mouth 2 times daily Yes Teodora Braun MD   atorvastatin (LIPITOR) 10 MG tablet take 1 tablet by mouth once daily Yes Eli Trinidad MD   melatonin 5 MG TABS tablet Take 5 mg by mouth nightly Yes Historical Provider, MD   fluticasone (FLONASE) 50 MCG/ACT nasal spray 2 sprays to each nostril once daily. Yes NORMAN Melo CNP   aspirin 81 MG tablet Take 81 mg by mouth daily. Yes Historical Provider, MD   Vitamin D (CHOLECALCIFEROL) 1000 UNITS CAPS capsule Take 3,000 Units by mouth daily  Yes Historical Provider, MD   Multiple Vitamin (MULTI-VITAMINS PO) Take 1 tablet by mouth Daily.  Yes Historical Provider, MD     Past Medical History:   Diagnosis Date    Acute confusion     CVA (cerebral infarction)     Hyperlipidemia     Hypothyroidism     [facility] infarction ([facility] [address]     Memory loss due to medical condition     Meralgia paresthetica     left side    Myopia with astigmatism and presbyopia     Perennial allergic rhinitis     RLS (restless legs syndrome)     TIA (transient ischemic attack) 2008, 1/06/2014    with brief confusion    VBI (vertebrobasilar insufficiency)      Past Surgical History:   Procedure Laterality Date   Keary Roup CARDIAC CATHETERIZATION  9/[facility]    [facility]   [facility] cath    COLONOSCOPY  02/25/2015    mild diverticulosis:  Nadja Matthews MD    INSERTABLE CARDIAC

## 2019-07-31 NOTE — PROGRESS NOTES
02/25/2025    DTaP/Tdap/Td vaccine (3 - Td) 01/28/2029    DEXA (modify frequency per FRAX score)  Completed    Pneumococcal 65+ years Vaccine  Completed     Recommendations for Preventive Services Due: see orders and patient instructions/AVS.  . Recommended screening schedule for the next 5-10 years is provided to the patient in written form: see Patient Instructions/AVS.    Magdalena PATEL Amy, LPN, 5/08/6619, performed the documented evaluation under the direct supervision of the attending physician.

## 2019-07-31 NOTE — PATIENT INSTRUCTIONS
Personalized Preventive Plan for Mary Hinojosa - 7/31/2019  Medicare offers a range of preventive health benefits. Some of the tests and screenings are paid in full while other may be subject to a deductible, co-insurance, and/or copay. Some of these benefits include a comprehensive review of your medical history including lifestyle, illnesses that may run in your family, and various assessments and screenings as appropriate. After reviewing your medical record and screening and assessments performed today your provider may have ordered immunizations, labs, imaging, and/or referrals for you. A list of these orders (if applicable) as well as your Preventive Care list are included within your After Visit Summary for your review. Other Preventive Recommendations:    · A preventive eye exam performed by an eye specialist is recommended every 1-2 years to screen for glaucoma; cataracts, macular degeneration, and other eye disorders. · A preventive dental visit is recommended every 6 months. · Try to get at least 150 minutes of exercise per week or 10,000 steps per day on a pedometer . · Order or download the FREE \"Exercise & Physical Activity: Your Everyday Guide\" from [employer]. Call 8-128.112.7282 or search [employer] online. · You need 8024-7764 mg of calcium and 7462-1315 IU of vitamin D per day. It is possible to meet your calcium requirement with diet alone, but a vitamin D supplement is usually necessary to meet this goal.  · When exposed to the sun, use a sunscreen that protects against both UVA and UVB radiation with an SPF of 30 or greater. Reapply every 2 to 3 hours or after sweating, drying off with a towel, or swimming. · Always wear a seat belt when traveling in a car. Always wear a helmet when riding a bicycle or motorcycle. Personalized Preventive Plan for Mary Hinojosa - 7/31/2019  Medicare offers a range of preventive health benefits.  Some of

## 2019-08-06 ASSESSMENT — ENCOUNTER SYMPTOMS
RHINORRHEA: 0
TROUBLE SWALLOWING: 0
SINUS PAIN: 0
BACK PAIN: 0
CHEST TIGHTNESS: 0
SINUS PRESSURE: 0
SORE THROAT: 0

## 2019-08-19 ENCOUNTER — HOSPITAL ENCOUNTER (OUTPATIENT)
Age: 69
Setting detail: SPECIMEN
Discharge: HOME | End: 2019-08-19
Payer: MEDICARE

## 2019-08-19 DIAGNOSIS — Z12.11 SCREEN FOR COLON CANCER: ICD-10-CM

## 2019-08-19 LAB
DATE, STOOL #1: NORMAL
DATE, STOOL #2: NORMAL
DATE, STOOL #3: NORMAL
HEMOCCULT SP1 STL QL: NEGATIVE
HEMOCCULT SP2 STL QL: NORMAL
HEMOCCULT SP3 STL QL: NORMAL
TIME, STOOL #1: 730
TIME, STOOL #2: NORMAL
TIME, STOOL #3: NORMAL

## 2019-08-19 PROCEDURE — 82274 ASSAY TEST FOR BLOOD FECAL: CPT

## 2019-09-10 ENCOUNTER — OFFICE VISIT (OUTPATIENT)
Dept: OPTOMETRY | Age: 69
End: 2019-09-10
Payer: MEDICARE

## 2019-09-10 DIAGNOSIS — H52.4 MYOPIA OF BOTH EYES WITH ASTIGMATISM AND PRESBYOPIA: ICD-10-CM

## 2019-09-10 DIAGNOSIS — H52.13 MYOPIA OF BOTH EYES WITH ASTIGMATISM AND PRESBYOPIA: ICD-10-CM

## 2019-09-10 DIAGNOSIS — H52.203 MYOPIA OF BOTH EYES WITH ASTIGMATISM AND PRESBYOPIA: ICD-10-CM

## 2019-09-10 DIAGNOSIS — H25.13 NUCLEAR SCLEROSIS OF BOTH EYES: ICD-10-CM

## 2019-09-10 DIAGNOSIS — H25.043 POSTERIOR SUBCAPSULAR POLAR SENILE CATARACT, BILATERAL: ICD-10-CM

## 2019-09-10 DIAGNOSIS — H53.8 BLURRY VISION, BILATERAL: Primary | ICD-10-CM

## 2019-09-10 PROCEDURE — G8598 ASA/ANTIPLAT THER USED: HCPCS | Performed by: OPTOMETRIST

## 2019-09-10 PROCEDURE — 1090F PRES/ABSN URINE INCON ASSESS: CPT | Performed by: OPTOMETRIST

## 2019-09-10 PROCEDURE — 99213 OFFICE O/P EST LOW 20 MIN: CPT | Performed by: OPTOMETRIST

## 2019-09-10 PROCEDURE — G8427 DOCREV CUR MEDS BY ELIG CLIN: HCPCS | Performed by: OPTOMETRIST

## 2019-09-10 PROCEDURE — 3017F COLORECTAL CA SCREEN DOC REV: CPT | Performed by: OPTOMETRIST

## 2019-09-10 PROCEDURE — 4040F PNEUMOC VAC/ADMIN/RCVD: CPT | Performed by: OPTOMETRIST

## 2019-09-10 PROCEDURE — 1123F ACP DISCUSS/DSCN MKR DOCD: CPT | Performed by: OPTOMETRIST

## 2019-09-10 PROCEDURE — G8417 CALC BMI ABV UP PARAM F/U: HCPCS | Performed by: OPTOMETRIST

## 2019-09-10 PROCEDURE — G8399 PT W/DXA RESULTS DOCUMENT: HCPCS | Performed by: OPTOMETRIST

## 2019-09-10 PROCEDURE — 1036F TOBACCO NON-USER: CPT | Performed by: OPTOMETRIST

## 2019-09-10 ASSESSMENT — SLIT LAMP EXAM - LIDS
COMMENTS: NORMAL
COMMENTS: NORMAL

## 2019-09-10 ASSESSMENT — REFRACTION_WEARINGRX
OS_SPHERE: PLANO
OS_ADD: +2.25
SPECS_TYPE: BIFOCAL
OD_ADD: +2.25
OD_SPHERE: PLANO

## 2019-09-10 ASSESSMENT — REFRACTION_MANIFEST
OS_CYLINDER: -1.00
OS_AXIS: 100
OD_CYLINDER: -0.75
OS_SPHERE: -4.00
OS_ADD: +2.25
OD_AXIS: 090
OD_SPHERE: -5.75
OD_ADD: +2.25

## 2019-09-10 ASSESSMENT — VISUAL ACUITY
CORRECTION_TYPE: CONTACTS
OS_CC: 20/40
OD_CC+: -1
METHOD: SNELLEN - LINEAR
OS_CC+: -1
OD_CC: 20/30 OU

## 2019-09-10 ASSESSMENT — REFRACTION_CURRENTRX
OS_BASECURVE: 8.6
OS_BRAND: COOPERVISION: BIOFINITY
OD_BASECURVE: 8.6
OD_BRAND: COOPERVISION: BIOFINITY
OS_SPHERE: -5.00
OD_SPHERE: -4.75

## 2019-09-10 ASSESSMENT — ENCOUNTER SYMPTOMS
GASTROINTESTINAL NEGATIVE: 0
RESPIRATORY NEGATIVE: 0
EYES NEGATIVE: 0
ALLERGIC/IMMUNOLOGIC NEGATIVE: 0

## 2019-09-10 ASSESSMENT — TONOMETRY
IOP_METHOD: NON-CONTACT AIR PUFF
OS_IOP_MMHG: 20
OD_IOP_MMHG: 18

## 2019-09-10 NOTE — PROGRESS NOTES
Karrie Abdul presents today for   Chief Complaint   Patient presents with    Vision Exam   .    HPI     Last Vision Exam: 2018 AW  Last Ophthalmology Exam: n/a  Last Filled Glasses Rx: Nieves Mari 2017  Insurance: Medicare  Update: Contacts  Distance and reading are getting more blurry.   Contact lenses full time and readers over the top  Especially noticing here that the vision is changing; otherwise just somewhat blurry              ROS     Negative for: Constitutional, Gastrointestinal, Neurological, Skin, Genitourinary, Musculoskeletal, HENT, Endocrine, Cardiovascular, Eyes, Respiratory, Psychiatric, Allergic/Imm, Heme/Lymph          Family History   Problem Relation Age of Onset    Stroke Mother     Other Mother          from pneumonia    Liver Disease Father         due to alcohol    Heart Disease Father         valve disease    Diabetes Father     Thyroid Disease Sister     Cancer Sister         breast cancer older sister    Diabetes Brother     High Cholesterol Sister     Diabetes Brother     Other Paternal Aunt         late onset alzheimers    Glaucoma Neg Hx     Cataracts Neg Hx        Social History     Socioeconomic History    Marital status:      Spouse name: None    Number of children: None    Years of education: None    Highest education level: None   Occupational History    Occupation: retired    Social Needs    Financial resource strain: None    Food insecurity:     Worry: None     Inability: None    Transportation needs:     Medical: None     Non-medical: None   Tobacco Use    Smoking status: Never Smoker    Smokeless tobacco: Never Used   Substance and Sexual Activity    Alcohol use: No    Drug use: No    Sexual activity: None   Lifestyle    Physical activity:     Days per week: None     Minutes per session: None    Stress: None   Relationships    Social connections:     Talks on phone: None     Gets together: None     Attends Shinto service: None Right [place] +2.25    Left [place] +2.25    Age:  1yrs    Type:  Bifocal                Manifest Refraction     Manifest Refraction       Sphere Cylinder Axis Dist VA Add    Right -5.75 -0.75 090 20/25 +2.25    Left -4.00 -1.00 100 20/25- +2.25          Manifest Refraction #2 (Auto)       Sphere Cylinder Axis Dist VA Add    Right -6.00 -0.50 093      Left -3.50 -1.75 096                   Final Rx       Sphere Cylinder Axis Add    Right -5.75 -0.75 090 +2.25    Left -4.00 -1.00 100 +2.25    Expiration Date:  9/10/2021           Contact Lens Current Rx     Current Contact Lens Rx       Brand Base Curve Sphere    Right Coopervision: Biofinity 8.6 -4.75    Left Coopervision: Biofinity 8.6 -5.00                Order when ready   Final Contact Lens Rx       Brand Base Curve Sphere    Right Coopervision: Biofinity 8.6 -5.25    Left Coopervision: Biofinity 8.6 -4.75    Expiration Date:  9/10/2020    Replacement:  Monthly    Wearing Schedule:  Daily wear   Trials given; Ok to order after using the trials           Plan:     1. Blurry vision, bilateral    2. Myopia of both eyes with astigmatism and presbyopia    3. Nuclear sclerosis of both eyes    4. Posterior subcapsular polar senile cataract, bilateral             Patient Instructions   New trials given in the office; order when need more;  Return if any problems with the new prescription of contact lenses;        Return in about 1 year (around 9/10/2020) for complete eye exam.

## 2019-09-10 NOTE — PATIENT INSTRUCTIONS
New trials given in the office; order when need more;  Return if any problems with the new prescription of contact lenses;

## 2019-10-31 DIAGNOSIS — E03.9 ACQUIRED HYPOTHYROIDISM: ICD-10-CM

## 2019-10-31 RX ORDER — LEVOTHYROXINE SODIUM 0.12 MG/1
TABLET ORAL
Qty: 90 TABLET | Refills: 1 | Status: SHIPPED | OUTPATIENT
Start: 2019-10-31 | End: 2020-04-27

## 2019-10-31 RX ORDER — ATORVASTATIN CALCIUM 10 MG/1
TABLET, FILM COATED ORAL
Qty: 30 TABLET | Refills: 12 | Status: SHIPPED | OUTPATIENT
Start: 2019-10-31 | End: 2020-11-04

## 2019-11-19 ENCOUNTER — OFFICE VISIT (OUTPATIENT)
Dept: NEUROLOGY | Age: 69
End: 2019-11-19
Payer: MEDICARE

## 2019-11-19 VITALS
HEIGHT: 61 IN | SYSTOLIC BLOOD PRESSURE: 110 MMHG | HEART RATE: 67 BPM | OXYGEN SATURATION: 96 % | DIASTOLIC BLOOD PRESSURE: 80 MMHG | BODY MASS INDEX: 35.38 KG/M2 | WEIGHT: 187.4 LBS

## 2019-11-19 DIAGNOSIS — G45.9 TIA (TRANSIENT ISCHEMIC ATTACK): ICD-10-CM

## 2019-11-19 DIAGNOSIS — R42 DIZZINESS: ICD-10-CM

## 2019-11-19 DIAGNOSIS — G45.0 VBI (VERTEBROBASILAR INSUFFICIENCY): ICD-10-CM

## 2019-11-19 DIAGNOSIS — G25.81 RLS (RESTLESS LEGS SYNDROME): ICD-10-CM

## 2019-11-19 DIAGNOSIS — I63.81 LACUNAR INFARCTION (HCC): ICD-10-CM

## 2019-11-19 DIAGNOSIS — I63.00 CEREBROVASCULAR ACCIDENT (CVA) DUE TO THROMBOSIS OF PRECEREBRAL ARTERY (HCC): Primary | ICD-10-CM

## 2019-11-19 DIAGNOSIS — I69.90 LATE EFFECTS OF CVA (CEREBROVASCULAR ACCIDENT): ICD-10-CM

## 2019-11-19 DIAGNOSIS — I63.019 CEREBRAL INFARCTION DUE TO THROMBOSIS OF VERTEBRAL ARTERY, UNSPECIFIED BLOOD VESSEL LATERALITY (HCC): ICD-10-CM

## 2019-11-19 DIAGNOSIS — E78.49 OTHER HYPERLIPIDEMIA: ICD-10-CM

## 2019-11-19 DIAGNOSIS — R41.3 MEMORY LOSS DUE TO MEDICAL CONDITION: ICD-10-CM

## 2019-11-19 DIAGNOSIS — G57.10 MERALGIA PARESTHETICA, UNSPECIFIED LATERALITY: ICD-10-CM

## 2019-11-19 DIAGNOSIS — I63.9 CEREBRAL INFARCTION, UNSPECIFIED MECHANISM (HCC): ICD-10-CM

## 2019-11-19 DIAGNOSIS — E78.2 MIXED HYPERLIPIDEMIA: ICD-10-CM

## 2019-11-19 DIAGNOSIS — E03.9 ACQUIRED HYPOTHYROIDISM: ICD-10-CM

## 2019-11-19 PROCEDURE — 99215 OFFICE O/P EST HI 40 MIN: CPT | Performed by: PSYCHIATRY & NEUROLOGY

## 2019-11-19 PROCEDURE — 4040F PNEUMOC VAC/ADMIN/RCVD: CPT | Performed by: PSYCHIATRY & NEUROLOGY

## 2019-11-19 PROCEDURE — G8399 PT W/DXA RESULTS DOCUMENT: HCPCS | Performed by: PSYCHIATRY & NEUROLOGY

## 2019-11-19 PROCEDURE — 3017F COLORECTAL CA SCREEN DOC REV: CPT | Performed by: PSYCHIATRY & NEUROLOGY

## 2019-11-19 PROCEDURE — 1090F PRES/ABSN URINE INCON ASSESS: CPT | Performed by: PSYCHIATRY & NEUROLOGY

## 2019-11-19 PROCEDURE — G8482 FLU IMMUNIZE ORDER/ADMIN: HCPCS | Performed by: PSYCHIATRY & NEUROLOGY

## 2019-11-19 PROCEDURE — G8598 ASA/ANTIPLAT THER USED: HCPCS | Performed by: PSYCHIATRY & NEUROLOGY

## 2019-11-19 PROCEDURE — 1036F TOBACCO NON-USER: CPT | Performed by: PSYCHIATRY & NEUROLOGY

## 2019-11-19 PROCEDURE — G8427 DOCREV CUR MEDS BY ELIG CLIN: HCPCS | Performed by: PSYCHIATRY & NEUROLOGY

## 2019-11-19 PROCEDURE — G8417 CALC BMI ABV UP PARAM F/U: HCPCS | Performed by: PSYCHIATRY & NEUROLOGY

## 2019-11-19 PROCEDURE — 1123F ACP DISCUSS/DSCN MKR DOCD: CPT | Performed by: PSYCHIATRY & NEUROLOGY

## 2019-11-19 RX ORDER — CLOPIDOGREL BISULFATE 75 MG/1
TABLET ORAL
Qty: 90 TABLET | Refills: 1 | Status: SHIPPED | OUTPATIENT
Start: 2019-11-19 | End: 2020-05-15 | Stop reason: SDUPTHER

## 2019-11-19 ASSESSMENT — ENCOUNTER SYMPTOMS
ABDOMINAL PAIN: 0
DIARRHEA: 0
FACIAL SWELLING: 0
NAUSEA: 0
SORE THROAT: 0
EYE ITCHING: 0
BACK PAIN: 0
CHOKING: 0
EYE REDNESS: 0
CONSTIPATION: 0
CHANGE IN BOWEL HABIT: 0
COLOR CHANGE: 0
EYE PAIN: 0
VOMITING: 0
TROUBLE SWALLOWING: 0
BOWEL INCONTINENCE: 0
SWOLLEN GLANDS: 0
SHORTNESS OF BREATH: 0
WHEEZING: 0
PHOTOPHOBIA: 0
SINUS PRESSURE: 0
CHEST TIGHTNESS: 0
COUGH: 0
BLOOD IN STOOL: 0
ABDOMINAL DISTENTION: 0
EYE DISCHARGE: 0
APNEA: 0
VISUAL CHANGE: 0
VOICE CHANGE: 0

## 2019-11-22 ENCOUNTER — TELEPHONE (OUTPATIENT)
Dept: OPTOMETRY | Age: 69
End: 2019-11-22

## 2019-12-16 ENCOUNTER — OFFICE VISIT (OUTPATIENT)
Dept: PRIMARY CARE CLINIC | Age: 69
End: 2019-12-16
Payer: MEDICARE

## 2019-12-16 VITALS
TEMPERATURE: 98.8 F | OXYGEN SATURATION: 97 % | RESPIRATION RATE: 18 BRPM | SYSTOLIC BLOOD PRESSURE: 122 MMHG | HEART RATE: 72 BPM | DIASTOLIC BLOOD PRESSURE: 60 MMHG | HEIGHT: 61 IN | WEIGHT: 182.2 LBS | BODY MASS INDEX: 34.4 KG/M2

## 2019-12-16 DIAGNOSIS — R05.9 COUGH: Primary | ICD-10-CM

## 2019-12-16 PROCEDURE — 1123F ACP DISCUSS/DSCN MKR DOCD: CPT | Performed by: NURSE PRACTITIONER

## 2019-12-16 PROCEDURE — 1036F TOBACCO NON-USER: CPT | Performed by: NURSE PRACTITIONER

## 2019-12-16 PROCEDURE — 1090F PRES/ABSN URINE INCON ASSESS: CPT | Performed by: NURSE PRACTITIONER

## 2019-12-16 PROCEDURE — 3017F COLORECTAL CA SCREEN DOC REV: CPT | Performed by: NURSE PRACTITIONER

## 2019-12-16 PROCEDURE — G8427 DOCREV CUR MEDS BY ELIG CLIN: HCPCS | Performed by: NURSE PRACTITIONER

## 2019-12-16 PROCEDURE — G8482 FLU IMMUNIZE ORDER/ADMIN: HCPCS | Performed by: NURSE PRACTITIONER

## 2019-12-16 PROCEDURE — 99213 OFFICE O/P EST LOW 20 MIN: CPT | Performed by: NURSE PRACTITIONER

## 2019-12-16 PROCEDURE — 94640 AIRWAY INHALATION TREATMENT: CPT | Performed by: NURSE PRACTITIONER

## 2019-12-16 PROCEDURE — G8399 PT W/DXA RESULTS DOCUMENT: HCPCS | Performed by: NURSE PRACTITIONER

## 2019-12-16 PROCEDURE — 4040F PNEUMOC VAC/ADMIN/RCVD: CPT | Performed by: NURSE PRACTITIONER

## 2019-12-16 PROCEDURE — G8598 ASA/ANTIPLAT THER USED: HCPCS | Performed by: NURSE PRACTITIONER

## 2019-12-16 PROCEDURE — G8417 CALC BMI ABV UP PARAM F/U: HCPCS | Performed by: NURSE PRACTITIONER

## 2019-12-16 RX ORDER — PREDNISONE 10 MG/1
TABLET ORAL
Qty: 20 TABLET | Refills: 0 | Status: SHIPPED | OUTPATIENT
Start: 2019-12-16 | End: 2020-05-15 | Stop reason: ALTCHOICE

## 2019-12-16 RX ORDER — ALBUTEROL SULFATE 90 UG/1
2 AEROSOL, METERED RESPIRATORY (INHALATION) EVERY 6 HOURS PRN
Qty: 1 INHALER | Refills: 0 | Status: SHIPPED | OUTPATIENT
Start: 2019-12-16 | End: 2022-06-29 | Stop reason: ALTCHOICE

## 2019-12-16 RX ORDER — ALBUTEROL SULFATE 2.5 MG/3ML
2.5 SOLUTION RESPIRATORY (INHALATION) ONCE
Status: COMPLETED | OUTPATIENT
Start: 2019-12-16 | End: 2019-12-16

## 2019-12-16 RX ADMIN — ALBUTEROL SULFATE 2.5 MG: 2.5 SOLUTION RESPIRATORY (INHALATION) at 10:09

## 2019-12-16 ASSESSMENT — ENCOUNTER SYMPTOMS
SINUS PRESSURE: 0
WHEEZING: 1
CHEST TIGHTNESS: 1
COUGH: 1
RHINORRHEA: 0
SINUS PAIN: 0

## 2019-12-19 DIAGNOSIS — G25.81 RLS (RESTLESS LEGS SYNDROME): Primary | ICD-10-CM

## 2019-12-19 RX ORDER — ROPINIROLE 1 MG/1
TABLET, FILM COATED ORAL
Qty: 30 TABLET | Refills: 5 | Status: SHIPPED | OUTPATIENT
Start: 2019-12-19 | End: 2020-05-15 | Stop reason: SDUPTHER

## 2020-01-13 ENCOUNTER — OFFICE VISIT (OUTPATIENT)
Dept: CARDIOLOGY | Age: 70
End: 2020-01-13
Payer: MEDICARE

## 2020-01-13 VITALS
WEIGHT: 186 LBS | SYSTOLIC BLOOD PRESSURE: 130 MMHG | DIASTOLIC BLOOD PRESSURE: 80 MMHG | BODY MASS INDEX: 35.12 KG/M2 | HEIGHT: 61 IN

## 2020-01-13 PROCEDURE — 99214 OFFICE O/P EST MOD 30 MIN: CPT | Performed by: INTERNAL MEDICINE

## 2020-01-13 PROCEDURE — 3017F COLORECTAL CA SCREEN DOC REV: CPT | Performed by: INTERNAL MEDICINE

## 2020-01-13 PROCEDURE — G8399 PT W/DXA RESULTS DOCUMENT: HCPCS | Performed by: INTERNAL MEDICINE

## 2020-01-13 PROCEDURE — 1090F PRES/ABSN URINE INCON ASSESS: CPT | Performed by: INTERNAL MEDICINE

## 2020-01-13 PROCEDURE — 1123F ACP DISCUSS/DSCN MKR DOCD: CPT | Performed by: INTERNAL MEDICINE

## 2020-01-13 PROCEDURE — G8482 FLU IMMUNIZE ORDER/ADMIN: HCPCS | Performed by: INTERNAL MEDICINE

## 2020-01-13 PROCEDURE — 1036F TOBACCO NON-USER: CPT | Performed by: INTERNAL MEDICINE

## 2020-01-13 PROCEDURE — 4040F PNEUMOC VAC/ADMIN/RCVD: CPT | Performed by: INTERNAL MEDICINE

## 2020-01-13 PROCEDURE — 93010 ELECTROCARDIOGRAM REPORT: CPT | Performed by: INTERNAL MEDICINE

## 2020-01-13 PROCEDURE — 93005 ELECTROCARDIOGRAM TRACING: CPT | Performed by: INTERNAL MEDICINE

## 2020-01-13 PROCEDURE — G8417 CALC BMI ABV UP PARAM F/U: HCPCS | Performed by: INTERNAL MEDICINE

## 2020-01-13 PROCEDURE — G8427 DOCREV CUR MEDS BY ELIG CLIN: HCPCS | Performed by: INTERNAL MEDICINE

## 2020-01-13 NOTE — PROGRESS NOTES
2014 and extracted 04/2016 with no documented arrhythmias. 6. CVA/TIA on 1/14/14 at hospital: on ASA and Plavix. 7. Hypothyroidism. 8. Aggressive risk factors modifications. 9. Diet, exercise and loose weight. Thank you for allowing me to participate in the care of this patient, please do not hesitate to call if you have any questions. Raymond Ortega, [id number] [facility]  [facility]  [phone]

## 2020-02-11 ENCOUNTER — HOSPITAL ENCOUNTER (OUTPATIENT)
Dept: LAB | Age: 70
Discharge: HOME | End: 2020-02-11
Payer: MEDICARE

## 2020-02-11 LAB
ABSOLUTE EOS #: 0.31 K/UL (ref 0–0.44)
ABSOLUTE IMMATURE GRANULOCYTE: <0.03 K/UL (ref 0–0.3)
ABSOLUTE LYMPH #: 1.81 K/UL (ref 1.1–3.7)
ABSOLUTE MONO #: 0.82 K/UL (ref 0.1–1.2)
BASOPHILS # BLD: 1 % (ref 0–2)
BASOPHILS ABSOLUTE: 0.07 K/UL (ref 0–0.2)
CHOLESTEROL, FASTING: 163 MG/DL
CHOLESTEROL/HDL RATIO: 3.5
DIFFERENTIAL TYPE: ABNORMAL
EOSINOPHILS RELATIVE PERCENT: 4 % (ref 1–4)
ESTIMATED AVERAGE GLUCOSE: 120 MG/DL
FOLATE: >20 NG/ML
HBA1C MFR BLD: 5.8 % (ref 4.8–5.9)
HCT VFR BLD CALC: 44.3 % (ref 36.3–47.1)
HDLC SERPL-MCNC: 46 MG/DL
HEMOGLOBIN: 14.1 G/DL (ref 11.9–15.1)
IMMATURE GRANULOCYTES: 0 %
LDL CHOLESTEROL: 93 MG/DL (ref 0–130)
LYMPHOCYTES # BLD: 22 % (ref 24–43)
MCH RBC QN AUTO: 27.4 PG (ref 25.2–33.5)
MCHC RBC AUTO-ENTMCNC: 31.8 G/DL (ref 25.2–33.5)
MCV RBC AUTO: 86 FL (ref 82.6–102.9)
MONOCYTES # BLD: 10 % (ref 3–12)
NRBC AUTOMATED: 0 PER 100 WBC
PDW BLD-RTO: 12.5 % (ref 11.8–14.4)
PLATELET # BLD: 302 K/UL (ref 138–453)
PLATELET ESTIMATE: ABNORMAL
PMV BLD AUTO: 9.8 FL (ref 8.1–13.5)
RBC # BLD: 5.15 M/UL (ref 3.95–5.11)
RBC # BLD: ABNORMAL 10*6/UL
SEG NEUTROPHILS: 63 % (ref 36–65)
SEGMENTED NEUTROPHILS ABSOLUTE COUNT: 5.18 K/UL (ref 1.5–8.1)
THYROXINE, FREE: 1.5 NG/DL (ref 0.93–1.7)
TRIGLYCERIDE, FASTING: 118 MG/DL
TSH SERPL DL<=0.05 MIU/L-ACNC: 1.47 MIU/L (ref 0.3–5)
VITAMIN B-12: 592 PG/ML (ref 232–1245)
VITAMIN D 25-HYDROXY: 40.4 NG/ML (ref 30–100)
VLDLC SERPL CALC-MCNC: NORMAL MG/DL (ref 1–30)
WBC # BLD: 8.2 K/UL (ref 3.5–11.3)
WBC # BLD: ABNORMAL 10*3/UL

## 2020-02-11 PROCEDURE — 84443 ASSAY THYROID STIM HORMONE: CPT

## 2020-02-11 PROCEDURE — 36415 COLL VENOUS BLD VENIPUNCTURE: CPT

## 2020-02-11 PROCEDURE — 85025 COMPLETE CBC W/AUTO DIFF WBC: CPT

## 2020-02-11 PROCEDURE — 82607 VITAMIN B-12: CPT

## 2020-02-11 PROCEDURE — 82746 ASSAY OF FOLIC ACID SERUM: CPT

## 2020-02-11 PROCEDURE — 83036 HEMOGLOBIN GLYCOSYLATED A1C: CPT

## 2020-02-11 PROCEDURE — 82306 VITAMIN D 25 HYDROXY: CPT

## 2020-02-11 PROCEDURE — 80061 LIPID PANEL: CPT

## 2020-02-11 PROCEDURE — 84439 ASSAY OF FREE THYROXINE: CPT

## 2020-03-16 ENCOUNTER — OFFICE VISIT (OUTPATIENT)
Dept: PRIMARY CARE CLINIC | Age: 70
End: 2020-03-16
Payer: MEDICARE

## 2020-03-16 ENCOUNTER — HOSPITAL ENCOUNTER (OUTPATIENT)
Dept: GENERAL RADIOLOGY | Age: 70
Discharge: HOME | End: 2020-03-18
Payer: MEDICARE

## 2020-03-16 ENCOUNTER — HOSPITAL ENCOUNTER (OUTPATIENT)
Age: 70
Setting detail: SPECIMEN
Discharge: HOME | End: 2020-03-16
Payer: MEDICARE

## 2020-03-16 VITALS
WEIGHT: 184 LBS | HEART RATE: 91 BPM | RESPIRATION RATE: 18 BRPM | HEIGHT: 61 IN | BODY MASS INDEX: 34.74 KG/M2 | DIASTOLIC BLOOD PRESSURE: 78 MMHG | SYSTOLIC BLOOD PRESSURE: 115 MMHG | OXYGEN SATURATION: 96 % | TEMPERATURE: 100.3 F

## 2020-03-16 LAB
DIRECT EXAM: ABNORMAL
DIRECT EXAM: ABNORMAL
Lab: ABNORMAL
SPECIMEN DESCRIPTION: ABNORMAL

## 2020-03-16 PROCEDURE — 4040F PNEUMOC VAC/ADMIN/RCVD: CPT | Performed by: FAMILY MEDICINE

## 2020-03-16 PROCEDURE — 1036F TOBACCO NON-USER: CPT | Performed by: FAMILY MEDICINE

## 2020-03-16 PROCEDURE — 99213 OFFICE O/P EST LOW 20 MIN: CPT

## 2020-03-16 PROCEDURE — G8482 FLU IMMUNIZE ORDER/ADMIN: HCPCS | Performed by: FAMILY MEDICINE

## 2020-03-16 PROCEDURE — G8399 PT W/DXA RESULTS DOCUMENT: HCPCS | Performed by: FAMILY MEDICINE

## 2020-03-16 PROCEDURE — 87804 INFLUENZA ASSAY W/OPTIC: CPT

## 2020-03-16 PROCEDURE — G8417 CALC BMI ABV UP PARAM F/U: HCPCS | Performed by: FAMILY MEDICINE

## 2020-03-16 PROCEDURE — 71046 X-RAY EXAM CHEST 2 VIEWS: CPT

## 2020-03-16 PROCEDURE — 99214 OFFICE O/P EST MOD 30 MIN: CPT | Performed by: FAMILY MEDICINE

## 2020-03-16 PROCEDURE — G8427 DOCREV CUR MEDS BY ELIG CLIN: HCPCS | Performed by: FAMILY MEDICINE

## 2020-03-16 PROCEDURE — 3017F COLORECTAL CA SCREEN DOC REV: CPT | Performed by: FAMILY MEDICINE

## 2020-03-16 PROCEDURE — 1090F PRES/ABSN URINE INCON ASSESS: CPT | Performed by: FAMILY MEDICINE

## 2020-03-16 PROCEDURE — 1123F ACP DISCUSS/DSCN MKR DOCD: CPT | Performed by: FAMILY MEDICINE

## 2020-03-16 RX ORDER — OSELTAMIVIR PHOSPHATE 75 MG/1
75 CAPSULE ORAL 2 TIMES DAILY
Qty: 10 CAPSULE | Refills: 0 | Status: SHIPPED | OUTPATIENT
Start: 2020-03-16 | End: 2020-05-15 | Stop reason: ALTCHOICE

## 2020-03-16 ASSESSMENT — ENCOUNTER SYMPTOMS
SHORTNESS OF BREATH: 1
NAUSEA: 0
EYE REDNESS: 0
EYE DISCHARGE: 0
VOMITING: 0
DIARRHEA: 0
TROUBLE SWALLOWING: 0
WHEEZING: 1
SORE THROAT: 0
CONSTIPATION: 0
ABDOMINAL PAIN: 0
SINUS PRESSURE: 0
RHINORRHEA: 0
COUGH: 1

## 2020-03-16 NOTE — PROGRESS NOTES
3/16/2020     Ofe Jean (:  1950) is a 68 y.o. female, here for evaluation of the following medical concerns:    Cough   This is a new problem. The current episode started in the past 7 days. The problem has been gradually worsening. The problem occurs constantly. The cough is productive of sputum. Associated symptoms include a fever, myalgias, shortness of breath and wheezing. Pertinent negatives include no chest pain, chills, ear pain, eye redness, headaches, nasal congestion, postnasal drip, rash, rhinorrhea or sore throat. Treatments tried: tylenol, coricidan. The treatment provided mild relief. There is no history of environmental allergies. Did review patient's med list, allergies, social history,pmhx and pshx today as noted in the record. Review of Systems   Constitutional: Positive for fever. Negative for chills and fatigue. HENT: Negative for congestion, ear pain, postnasal drip, rhinorrhea, sinus pressure, sore throat and trouble swallowing. Eyes: Negative for discharge and redness. Respiratory: Positive for cough, shortness of breath and wheezing. Cardiovascular: Negative for chest pain. Gastrointestinal: Negative for abdominal pain, constipation, diarrhea, nausea and vomiting. Genitourinary: Negative for dysuria, flank pain, frequency and urgency. Musculoskeletal: Positive for myalgias. Negative for arthralgias and neck pain. Skin: Negative for rash and wound. Allergic/Immunologic: Negative for environmental allergies. Neurological: Negative for dizziness, weakness, light-headedness and headaches. Hematological: Negative for adenopathy. Psychiatric/Behavioral: Negative. Prior to Visit Medications    Medication Sig Taking?  Authorizing Provider   rOPINIRole (REQUIP) 1 MG tablet take 1 tablet by mouth at bedtime Yes Ash Lopez MD   albuterol sulfate HFA (PROVENTIL HFA) 108 (90 Base) MCG/ACT inhaler Inhale 2 puffs into the lungs every 6 Left Ear: External ear normal.      Ears:      Comments: TMs dull with fluid behind the TM     Nose: Congestion and rhinorrhea present. Mouth/Throat:      Pharynx: No posterior oropharyngeal erythema. Comments: Post nasal drainage noted  Eyes:      General: No scleral icterus. Right eye: No discharge. Left eye: No discharge. Conjunctiva/sclera: Conjunctivae normal.      Pupils: Pupils are equal, round, and reactive to light. Neck:      Musculoskeletal: Normal range of motion and neck supple. Thyroid: No thyromegaly. Cardiovascular:      Rate and Rhythm: Normal rate and regular rhythm. Heart sounds: Normal heart sounds. Pulmonary:      Effort: Pulmonary effort is normal. No respiratory distress. Breath sounds: Normal breath sounds. No wheezing. Lymphadenopathy:      Cervical: Cervical adenopathy present. Skin:     General: Skin is warm and dry. Findings: No rash. Neurological:      Mental Status: She is alert and oriented to person, place, and time. Psychiatric:         Behavior: Behavior normal.         Thought Content: Thought content normal.         Judgment: Judgment normal.         ASSESSMENT/PLAN:  Encounter Diagnoses   Name Primary?  Influenza A Yes    Cough      Orders Placed This Encounter   Medications    oseltamivir (TAMIFLU) 75 MG capsule     Sig: Take 1 capsule by mouth 2 times daily     Dispense:  10 capsule     Refill:  0     Orders Placed This Encounter   Procedures    Rapid influenza A/B antigens     Standing Status:   Future     Number of Occurrences:   1     Standing Expiration Date:   3/16/2021    XR CHEST STANDARD (2 VW)     Standing Status:   Future     Number of Occurrences:   1     Standing Expiration Date:   3/16/2021     Influenza A/B positive/negative. I did personally review her CXR. No acute pathology noted. Will treat with tamiflu as noted above.     Tylenol/Motrin prn    Increase fluids and rest    Return

## 2020-04-14 ENCOUNTER — TELEPHONE (OUTPATIENT)
Dept: CARDIOLOGY | Age: 70
End: 2020-04-14

## 2020-04-27 RX ORDER — LEVOTHYROXINE SODIUM 0.12 MG/1
TABLET ORAL
Qty: 90 TABLET | Refills: 1 | Status: SHIPPED | OUTPATIENT
Start: 2020-04-27 | End: 2020-10-12 | Stop reason: SDUPTHER

## 2020-04-27 NOTE — TELEPHONE ENCOUNTER
Last Appt:  07/31/2019  Next Appt:  08/3/2020  Med verified in Epic  Last TSH was on  02/11/2020 And was 1.47

## 2020-05-15 ENCOUNTER — OFFICE VISIT (OUTPATIENT)
Dept: NEUROLOGY | Age: 70
End: 2020-05-15
Payer: MEDICARE

## 2020-05-15 VITALS
BODY MASS INDEX: 35.3 KG/M2 | WEIGHT: 187 LBS | HEIGHT: 61 IN | HEART RATE: 79 BPM | SYSTOLIC BLOOD PRESSURE: 122 MMHG | OXYGEN SATURATION: 98 % | DIASTOLIC BLOOD PRESSURE: 80 MMHG

## 2020-05-15 PROCEDURE — 1123F ACP DISCUSS/DSCN MKR DOCD: CPT | Performed by: PSYCHIATRY & NEUROLOGY

## 2020-05-15 PROCEDURE — 1036F TOBACCO NON-USER: CPT | Performed by: PSYCHIATRY & NEUROLOGY

## 2020-05-15 PROCEDURE — 1090F PRES/ABSN URINE INCON ASSESS: CPT | Performed by: PSYCHIATRY & NEUROLOGY

## 2020-05-15 PROCEDURE — G8399 PT W/DXA RESULTS DOCUMENT: HCPCS | Performed by: PSYCHIATRY & NEUROLOGY

## 2020-05-15 PROCEDURE — 3017F COLORECTAL CA SCREEN DOC REV: CPT | Performed by: PSYCHIATRY & NEUROLOGY

## 2020-05-15 PROCEDURE — 99213 OFFICE O/P EST LOW 20 MIN: CPT

## 2020-05-15 PROCEDURE — 99215 OFFICE O/P EST HI 40 MIN: CPT | Performed by: PSYCHIATRY & NEUROLOGY

## 2020-05-15 PROCEDURE — 4040F PNEUMOC VAC/ADMIN/RCVD: CPT | Performed by: PSYCHIATRY & NEUROLOGY

## 2020-05-15 PROCEDURE — G8417 CALC BMI ABV UP PARAM F/U: HCPCS | Performed by: PSYCHIATRY & NEUROLOGY

## 2020-05-15 PROCEDURE — G8427 DOCREV CUR MEDS BY ELIG CLIN: HCPCS | Performed by: PSYCHIATRY & NEUROLOGY

## 2020-05-15 RX ORDER — CLOPIDOGREL BISULFATE 75 MG/1
TABLET ORAL
Qty: 90 TABLET | Refills: 1 | Status: SHIPPED | OUTPATIENT
Start: 2020-05-15 | End: 2020-11-06 | Stop reason: SDUPTHER

## 2020-05-15 RX ORDER — CALCIUM CARBONATE 500(1250)
500 TABLET ORAL DAILY
COMMUNITY

## 2020-05-15 RX ORDER — ROPINIROLE 1 MG/1
TABLET, FILM COATED ORAL
Qty: 90 TABLET | Refills: 1 | Status: SHIPPED | OUTPATIENT
Start: 2020-05-15 | End: 2020-11-06 | Stop reason: SDUPTHER

## 2020-05-15 ASSESSMENT — ENCOUNTER SYMPTOMS
BLOOD IN STOOL: 0
EYE PAIN: 0
WHEEZING: 0
DIARRHEA: 0
NAUSEA: 0
BACK PAIN: 0
CHEST TIGHTNESS: 0
CONSTIPATION: 0
CHANGE IN BOWEL HABIT: 0
FACIAL SWELLING: 0
EYE REDNESS: 0
CHOKING: 0
APNEA: 0
SORE THROAT: 0
EYE DISCHARGE: 0
VOMITING: 0
VOICE CHANGE: 0
BOWEL INCONTINENCE: 0
ABDOMINAL DISTENTION: 0
ABDOMINAL PAIN: 0
SINUS PRESSURE: 0
PHOTOPHOBIA: 0
SWOLLEN GLANDS: 0
COLOR CHANGE: 0
TROUBLE SWALLOWING: 0
VISUAL CHANGE: 0
COUGH: 0
SHORTNESS OF BREATH: 0
EYE ITCHING: 0

## 2020-05-15 NOTE — PATIENT INSTRUCTIONS
[facility]    Due to the complex nature of our neurological testing, It is the policy of the Neurology Department not to release the results of your testing over the phone. Once all testing is completed and we have all the results back, Dr. Jose Antonio Noble will then personally go over all the results with you and answer any questions that you may have during a follow up appointment. If you are unable to keep this appointment, please notify the [address] @ [phone], as soon as possible. Please bring your prescription bottles to all appointments. Advance Care Planning  People with COVID-19 may have no symptoms, mild symptoms, such as fever, cough, and shortness of breath or they may have more severe illness, developing severe and fatal pneumonia. As a result, Advance Care Planning with attention to naming a health care decision maker (someone you trust to make healthcare decisions for you if you could not speak for yourself) and sharing other health care preferences is important BEFORE a possible health crisis. Please contact your Primary Care Provider to discuss Advance Care Planning. Preventing the Spread of Coronavirus Disease 2019 in Homes and Residential Communities  For the most recent information go to [email or web address]    Prevention steps for People with confirmed or suspected COVID-19 (including persons under investigation) who do not need to be hospitalized  and   People with confirmed COVID-19 who were hospitalized and determined to be medically stable to go home    Your healthcare provider and public health staff will evaluate whether you can be cared for at home. If it is determined that you do not need to be hospitalized and can be isolated at home, you will be monitored by staff from your local or state health department.  You should follow the prevention steps below until a healthcare provider or local Worsening   Of  Current  Symptoms  And  Or  If    Any additional  New  Neurological  Symptoms                 Or  Significant  Concerns   Should  Call  911 or  Go  To  Emergency  Department  For  Further  Immediate  Evaluation.

## 2020-05-15 NOTE — PROGRESS NOTES
fatigue, fever, headaches, light-headedness, nausea, neck pain, palpitations, shortness of breath, vertigo or vomiting. Past treatments include nothing. The treatment provided no relief. Her past medical history is significant for a CVA. There is no history of a bleeding disorder, a clotting disorder, dementia, head trauma, liver disease, mood changes or seizures. History obtained from  The patient     and other  available medical records were  Also  reviewed. 1)     PREVIOUS     H/O      TIA    WITH  MEMORY  LOSS                      AND  CONFUSION     IN      2008                      -   RESOLVED              2)    PREVIOUS     H/O       STROKE    IN   January 2014. H/O   INFARCT  IN MEDIAL   TEMPORAL  LOBE                 PREVIOUS    H/O      CONFUSION  AND  MEMORY  LOSS                        FOR   5  HOURS  AT  THAT  TIME.                                -   RESOLVED               3)     ON    ASA   AND  PLAVIX    AND  TOLERATING  THE  SAME. NO  RECURRENCE   OF  TIA OR  CVA            4)        H/O     RESTLESS  LEG  SYNDROME                   FOR  MORE  THAN    30  YEARS               BETTER  CONTROLLED   ON  REQUIP            5)   NO   H/O  CLINICAL  SEIZURE  ACTIVITY. NO  RECURRENCE  OF  MEMORY  LOSS                     OR  CONFUSION. 6)   NO   EVIDENCE  OF  PARKINSONIAN  FEATURES          PATIENT  DENIES  ANY  GAIT  DIFFICULTIES                      OR  BALANCE  PROBLEMS          7)       MERALGIA   PARESTHETICA  ON   THE  LEFT  THIGH                       -   STABLE          8)      MULTIPLE   CO  MORBID  MEDICAL  CONDITIONS. BEING  FOLLOWED  BY  HER  PCP. 9)      MILD  SHORT TERM  MEMORY PROBLEMS                         --  FOR    2  YEARS                 PATIENT  NOT  CONCERNED.                   PATIENT  NOT  INTERESTED  IN  MEDICATION  FOR  DEMENTIA                  PATIENT  COULD BE  TRIED  WITH absent            Lower back   Pain  And     Spasms  Absent              Radiating    Down   And   Weakness          absent                H/O   FALLS        absent               History  Of   Visual  Symptoms    Absent                  Associated   Diplopia       absent                                                                    Also   Additional   Symptoms   Present    As  Documented    In   The detailed      Review  Of  Systems   And    Please   Refer   To    Them for   Additional  Information. Any components  That are either  Unobtainable  Or  Limited  In   HPI, ROS  And/or PFSH       Are   Due   To   Patient's  Medical  Problems,  Clinical  Condition and/or lack of other  Alternate resources.             RECORDS   REVIEWED:    historical medical records, lab reports, office notes and radiology reports         INFORMATION   REVIEWED:     MEDICAL   HISTORY,     SURGICAL   HISTORY,   MEDICATIONS   LIST,   ALLERGIES AND  DRUG  INTOLERANCES,     FAMILY   HISTORY,  SOCIAL  HISTORY,    PROBLEM  LIST   FOR  PATIENT  CARE   COORDINATION           Past Medical History:   Diagnosis Date    Acute confusion     CVA (cerebral infarction)     Hyperlipidemia     Hypothyroidism     Lac[facility]ar infarction ([facility] [address]     Memory loss due to medical condition     Meralgia paresthetica     left side    Myopia with astigmatism and presbyopia     Perennial allergic rhinitis     RLS (restless legs syndrome)     TIA (transient ischemic attack) 2008, 1/06/2014    with brief confusion    VBI (vertebrobasilar insufficiency)                   Past Surgical History:   Procedure Laterality Date    CARDIAC CATHETERIZATION  9/2014    [address]   nl cath    COLONOSCOPY  02/25/2015    mild diverticulosis:  Kari BERNARD    INSERTABLE CARDIAC MONITOR  4/22/16    removed    PRE-MALIGNANT / BENIGN SKIN LESION EXCISION      hemangioma removal on right foot 2nd toe   [address] Component Value Date     02/05/2019    K 4.1 02/05/2019     02/05/2019    CO2 28 02/05/2019    BUN 16 02/05/2019    CREATININE 0.70 02/05/2019    CALCIUM 9.6 02/05/2019    PROT 7.5 02/05/2019    LABALBU 4.4 02/05/2019    BILITOT 1.10 02/05/2019    ALKPHOS 101 02/05/2019    AST 22 02/05/2019    ALT 24 02/05/2019     Lab Results   Component Value Date    ALKPHOS 101 02/05/2019    ALT 24 02/05/2019    AST 22 02/05/2019    PROT 7.5 02/05/2019    BILITOT 1.10 02/05/2019    LABALBU 4.4 02/05/2019     Lab Results   Component Value Date    BUN 16 02/05/2019    CREATININE 0.70 02/05/2019     Lab Results   Component Value Date    CALCIUM 9.6 02/05/2019    MG 2.1 01/06/2014     Lab Results   Component Value Date    AST 22 02/05/2019    ALT 24 02/05/2019       Lab Results   Component Value Date    CKTOTAL 59 11/08/2014                 Review of Systems   Constitutional: Negative for appetite change, chills, diaphoresis, fatigue, fever and unexpected weight change. HENT: Negative for congestion, dental problem, drooling, ear discharge, ear pain, facial swelling, hearing loss, mouth sores, nosebleeds, postnasal drip, sinus pressure, sore throat, tinnitus, trouble swallowing and voice change. Eyes: Negative for photophobia, pain, discharge, redness, itching and visual disturbance. Respiratory: Negative for apnea, cough, choking, chest tightness, shortness of breath and wheezing. Cardiovascular: Negative for chest pain, palpitations, leg swelling and near-syncope. Gastrointestinal: Negative for abdominal distention, abdominal pain, anorexia, blood in stool, bowel incontinence, change in bowel habit, constipation, diarrhea, nausea and vomiting. Endocrine: Negative for cold intolerance, heat intolerance, polydipsia, polyphagia and polyuria. Genitourinary: Negative for bladder incontinence.    Musculoskeletal: Negative for arthralgias, back pain, gait problem, joint swelling, myalgias, neck pain and neck stiffness. Skin: Negative for color change, pallor, rash and wound. Allergic/Immunologic: Negative for environmental allergies, food allergies and immunocompromised state. Neurological: Positive for dizziness. Negative for vertigo, tremors, focal weakness, seizures, syncope, facial asymmetry, speech difficulty, weakness, light-headedness, numbness, headaches and loss of balance. Hematological: Negative for adenopathy. Does not bruise/bleed easily. Psychiatric/Behavioral: Positive for decreased concentration. Negative for agitation, behavioral problems, confusion, dysphoric mood, hallucinations, memory loss, self-injury, sleep disturbance and suicidal ideas. The patient is not nervous/anxious and is not hyperactive. Objective:   Physical Exam  Constitutional:       Appearance: She is well-developed. HENT:      Head: Normocephalic and atraumatic. No raccoon eyes or Abernathy's sign. Right Ear: External ear normal.      Left Ear: External ear normal.      Nose: Nose normal.   Eyes:      Conjunctiva/sclera: Conjunctivae normal.      Pupils: Pupils are equal, round, and reactive to light. Neck:      Musculoskeletal: Normal range of motion and neck supple. Normal range of motion. No neck rigidity or muscular tenderness. Thyroid: No thyroid mass or thyromegaly. Vascular: No carotid bruit. Trachea: No tracheal deviation. Meningeal: Brudzinski's sign and Kernig's sign absent. Cardiovascular:      Rate and Rhythm: Normal rate and regular rhythm. Pulmonary:      Effort: Pulmonary effort is normal.   Musculoskeletal: Normal range of motion. General: No tenderness. Skin:     General: Skin is warm. Coloration: Skin is not pale. Findings: No erythema or rash. Nails: There is no clubbing. Psychiatric:         Attention and Perception: She is attentive. Mood and Affect: Mood is not anxious or depressed.  Affect is not labile, blunt or inappropriate. Speech: She is communicative. Speech is not rapid and pressured, delayed, slurred or tangential.         Behavior: Behavior is not agitated, slowed, aggressive, withdrawn, hyperactive or combative. Behavior is cooperative. Thought Content: Thought content is not paranoid or delusional. Thought content does not include homicidal or suicidal ideation. Thought content does not include homicidal or suicidal plan. Cognition and Memory: Cognition is impaired. Memory is impaired. She does not exhibit impaired recent memory or impaired remote memory. Judgment: Judgment is not impulsive or inappropriate. NEUROLOGICAL EXAMINATION :    A) MENTAL STATUS:                   Alert and  oriented  To time, place  And  Person. No Aphasia. No  Dysarthria. Able   To  Follow     SIMPLE   commands without   Any  Difficulty. No right  To left confusion. Normal  Speech  And language function. Insight and  Judgment ,Fund  Of  Knowledge   within normal limits                Recent  And  Remote memory   DECREASED                  Attention &Concentration are    DECREASED                                                 B) CRANIAL NERVES :             2 CN : Visual  Acuity  And  Visual fields  within normal limits                      Fundi  Could  Not  Be  Could  Not  Be  Evaluated. 3,4,6 CN : Both  Pupils are   Reactive and  Equal.                          Extraocular   Movements  Are  Intact. No  Nystagmus. No  TESSA. No  Afferent  Pupillary  Defect noted. 5 CN :  Normal  Facial sensations and Corneal  Reflexes         7 CN :  Normal  Facial  Symmetry  And  Strength. No facial  Weakness.          8 CN :  Hearing  Appears subnormal        9, 10 CN: Normal spontaneous, reflex palate movements       11 CN:   Normal  Shoulder shrug and  strength       12 CN : Normal  Tongue movements and  Tongue  In midline                      No tongue   Fasciculations or atrophy         C) MOTOR  EXAM:                 Strength  In upper  And  Lower extremities   within normal limits               No  Drift. No  Atrophy               Rapid alternating  And  repetitions  Movements  within normal limits               Muscle  Tone  In upper  And  Lower  Extremities  within normal limits                No rigidity. No  Spasticity. Bradykinesia   absent               No  Asterixis. Sustention  Tremor , Resting  Tremor   absent                    No other  Abnormal  Movements noted             D) SENSORY :               light touch, pinprick, position  And  Vibration  DECREASED        E) REFLEXES:                   Deep  Tendon  Reflexes within normal limits                    No pathological  Reflexes  Bilaterally.                                   F) COORDINATION  AND  GAIT :                                Station and  Gait  within normal limits                                        Romberg's test negative                          Ataxia negative        Assessment:       Patient Active Problem List   Diagnosis    Hypothyroidism    Hyperlipidemia    Cerebral infarction ([address]    Memory loss due to medical condition    TIA (transient ischemic attack)    Meralgia paresthetica    Cataract    Myopia of both eyes with astigmatism and presbyopia    RLS (restless legs syndrome)    Myopia of both eyes    Lacunar infarction ([address]    CVA (cerebral vascular accident) ([address]    VBI (vertebrobasilar insufficiency)    JOHNATHAN due to [address] Mixed hyperlipidemia    Cerebrovascular accident (CVA) due to thrombosis of precerebral artery ([address]    Cerebral infarction due to thrombosis of vertebral artery ([address]    Late effects of CVA (cerebrovascular accident)    Dizziness    Other hyperlipidemia           ULTRASOUND EVALUATION OF THE CAROTID ARTERIES CEREBRO  VASCULAR  ISCHEMIA, STROKE       *   COGNITIVE  &   MEMORY PROBLEMS  AND  DEMENTIA                 VARIOUS  RISK   FACTORS   WERE  REVIEWED   AND   DISCUSSED. PLAN:       José Miguel Wilson  Of  The  Diagnoses,  The  Management & Treatment  Options           AND    Care  plan  Were        Reviewed and   Discussed   With  patient. * Goals  And  Expectations  Of  The  Therapy  Discussed   And  Reviewed. *   Benefits   And   Side  Effect  Profile  Of  Medication/s   Were   Discussed             * Need   For  Further   Follow up For  The  Various  Problems  Were discussed. * Results  Of  The  Previous  Diagnostic tests were reviewed and questions answered. patient  understand the same. Medical  Decision  Making  Was  Made  Based on the   Complexity  Of  Above  Mentioned  Diagnoses,        Data reviewed   & diagnostic  Tests  Reviewed,  Risk  Of  Significant   Co morbidities and complicating   Factors. Medical  Decision  Was   High  Complexity  Due   To  The  Patient's  Multiple  Symptoms,  Advancing   Disease,      Complex  Treatment  Regimen,  Multiple medications and   Risk  Of   Side  Effects,  Difficulty  In  Medication  Management      And  Diagnostic  Challenges   In  View  Of  The  Associated   Co  Morbid  Conditions   And  Problems. * FALL   PRECAUTIONS. *   BE  CAREFUL  WITH  ACTIVITIES         *   ADEQUATE   FLUID  INTAKE   AND  ELECTROLYTE  BALANCE         * KEEP  DAIRY  OF   THE  NEUROLOGICAL  SYMPTOMS        RECORDING THE    DURATION  AND  FREQUENCY. *  AVOID    CONDITIONS  AND  FACTORS   THAT  MAKE   NEUROLOGICAL  SYMPTOMS  WORSE. *  TO  MAINTAIN  REGULAR  SLEEP  WAKE  CYCLES. *   TO  HAVE  ADEQUATE  REST  AND   SLEEP    HOURS.          *    TO   AVOID   TO  SLEEP  IN   SUPINE  POSITION. *      WEIGHT   LOSS.          *    AVOID  ANY USAGE OF                   TOBACCO,  EXCESSIVE  ALCOHOL  AND ILLEGAL   SUBSTANCES          *  CONTINUE MEDICATIONS PRESCRIBED BY NEUROLOGIST AS    RECOMMENDED     *   Compliance   With  Medications   And  Instructions      * CURRENTLY  TOLERATING  THE  PRESCRIBED   MEDICATIONS. WITHOUT  ANY  SIGNIFICANT  SIDE  EFFECTS   &  GETTING BENEFIT. *  MILD  SHORT TERM  MEMORY PROBLEMS    FOR    2  YEARS              PATIENT  NOT  CONCERNED. * PATIENT  AWARE  OF  VARIOUS  RISK  FACTORS  FOR  TIA  /  STROKE                    SAME  REVIEWED,  DISCUSSED   WITH  PATIENT  IN  DETAIL. *  May   Use  Pill  Box,    If  Needed      *    To  Continue  The    Antiplatelet  therapy    As   Recommended  Was   Discussed              *    Prophylactic  Use   Of     Vitamin   B   Complex,  Folic  Acid,    Vitamin  B12    Multivitamin       Tablet  Daily    Supplementations   Over  The  Counter  Discussed           *  PATIENT  IS  ALSO   COUNSELED   TO  KEEP    ACTIVITIES         A)   SIMPLE      B)  ORGANIZED      C)  WRITE   DOWN                    Orders Placed This Encounter   Medications    rOPINIRole (REQUIP) 1 MG tablet     Sig: take 1 tablet by mouth at bedtime     Dispense:  90 tablet     Refill:  1    clopidogrel (PLAVIX) 75 MG tablet     Sig: take 1 tablet by mouth once daily     Dispense:  90 tablet     Refill:  1                   *PATIENT   TO  FOLLOW  UP  WITH   PRIMARY  CARE   AND   OTHER  CONSULTANTS  AS  BEFORE. *  Maintain   Healthy  Life Style    With   Periodic  Monitoring  Of      Any  Medical  Conditions  Including   Elevated  Blood  Pressure,  Lipid  Profile,     Blood  Sugar levels  And   Heart  Disease. *   Period   Screening  For  Cancers  Involving  Breast,  Colon,     lungs  And  Other  Organs  As  Applicable,  In consultation   With  Your  Primary Care Providers. * Second  Neurological  Opinion  And  Evaluations  In  [facility]  Setting  If  Patient  Is  Interested. part of your treatment and safety. Be sure to make and go to all appointments, and call your doctor if you are having problems. Its also a good idea to know your test results and keep a list of the medicines you take. How can you care for yourself at home? Do not eat too much sugar, fat, or fast foods. You can still have dessert and treats now and then. The goal is moderation. Start small to improve your eating habits. Pay attention to portion sizes, drink less juice and soda pop, and eat more fruits and vegetables. Eat a healthy amount of food. A 3-ounce serving of meat, for example, is about the size of a deck of cards. Fill the rest of your plate with vegetables and whole grains. Limit the amount of soda and sports drinks you have every day. Drink more water when you are thirsty. Eat at least 5 servings of fruits and vegetables every day. It may seem like a lot, but it is not hard to reach this goal. A serving or helping is 1 piece of fruit, 1 cup of vegetables, or 2 cups of leafy, raw vegetables. Have an apple or some carrot sticks as an afternoon snack instead of a candy bar. Try to have fruits and/or vegetables at every meal.  Make exercise part of your daily routine. You may want to start with simple activities, such as walking, bicycling, or slow swimming. Try to be active 30 to 60 minutes every day. You do not need to do all 30 to 60 minutes all at once. For example, you can exercise 3 times a day for 10 or 20 minutes. Moderate exercise is safe for most people, but it is always a good idea to talk to your doctor before starting an exercise program.  Keep moving. [place] the lawn, work in the garden, or [employer] [place]. Take the stairs instead of the elevator at work. If you smoke, quit. People who smoke have an increased risk for heart attack, stroke, cancer, and other lung illnesses. Quitting is hard, but there are ways to boost your chance of quitting tobacco for good.   Use nicotine gum, patches, or

## 2020-08-03 ENCOUNTER — OFFICE VISIT (OUTPATIENT)
Dept: FAMILY MEDICINE CLINIC | Age: 70
End: 2020-08-03
Payer: MEDICARE

## 2020-08-03 VITALS
RESPIRATION RATE: 14 BRPM | DIASTOLIC BLOOD PRESSURE: 74 MMHG | OXYGEN SATURATION: 95 % | WEIGHT: 188 LBS | SYSTOLIC BLOOD PRESSURE: 118 MMHG | BODY MASS INDEX: 35.5 KG/M2 | HEART RATE: 87 BPM | HEIGHT: 61 IN

## 2020-08-03 PROCEDURE — 3017F COLORECTAL CA SCREEN DOC REV: CPT | Performed by: PHYSICIAN ASSISTANT

## 2020-08-03 PROCEDURE — 1123F ACP DISCUSS/DSCN MKR DOCD: CPT | Performed by: PHYSICIAN ASSISTANT

## 2020-08-03 PROCEDURE — 4040F PNEUMOC VAC/ADMIN/RCVD: CPT | Performed by: PHYSICIAN ASSISTANT

## 2020-08-03 PROCEDURE — G0439 PPPS, SUBSEQ VISIT: HCPCS | Performed by: PHYSICIAN ASSISTANT

## 2020-08-03 ASSESSMENT — LIFESTYLE VARIABLES: HOW OFTEN DO YOU HAVE A DRINK CONTAINING ALCOHOL: 0

## 2020-08-03 ASSESSMENT — ENCOUNTER SYMPTOMS
WHEEZING: 0
COUGH: 0
SHORTNESS OF BREATH: 0
DIARRHEA: 0
NAUSEA: 0
VOMITING: 0
GASTROINTESTINAL NEGATIVE: 1

## 2020-08-03 ASSESSMENT — PATIENT HEALTH QUESTIONNAIRE - PHQ9
SUM OF ALL RESPONSES TO PHQ QUESTIONS 1-9: 0
SUM OF ALL RESPONSES TO PHQ QUESTIONS 1-9: 0

## 2020-08-03 NOTE — PATIENT INSTRUCTIONS
Personalized Preventive Plan for [place] - 8/3/2020  Medicare offers a range of preventive health benefits. Some of the tests and screenings are paid in full while other may be subject to a deductible, co-insurance, and/or copay. Some of these benefits include a comprehensive review of your medical history including lifestyle, illnesses that may run in your family, and various assessments and screenings as appropriate. After reviewing your medical record and screening and assessments performed today your provider may have ordered immunizations, labs, imaging, and/or referrals for you. A list of these orders (if applicable) as well as your Preventive Care list are included within your After Visit Summary for your review. Other Preventive Recommendations:    · A preventive eye exam performed by an eye specialist is recommended every 1-2 years to screen for glaucoma; cataracts, macular degeneration, and other eye disorders. · A preventive dental visit is recommended every 6 months. · Try to get at least 150 minutes of exercise per week or 10,000 steps per day on a pedometer . · Order or download the FREE \"Exercise & Physical Activity: Your Everyday Guide\" from [employer]. Call 5-453.187.1254 or search [employer] online. · You need 1969-6082 mg of calcium and 7711-2809 IU of vitamin D per day. It is possible to meet your calcium requirement with diet alone, but a vitamin D supplement is usually necessary to meet this goal.  · When exposed to the sun, use a sunscreen that protects against both UVA and UVB radiation with an SPF of 30 or greater. Reapply every 2 to 3 hours or after sweating, drying off with a towel, or swimming. · Always wear a seat belt when traveling in a car. Always wear a helmet when riding a bicycle or motorcycle.

## 2020-08-03 NOTE — PROGRESS NOTES
Medicare Annual Wellness Visit  Name: Devon Kirkland Date: 8/3/2020   MRN: [id number] Sex: Female   Age: 65 y.o. Ethnicity: Non-/Non    : 1950 Race: Ivory Corona is here for Medicare AWV    Screenings for behavioral, psychosocial and functional/safety risks, and cognitive dysfunction are all negative except as indicated below. These results, as well as other patient data from the [address] form, are documented in Flowsheets linked to this Encounter. Allergies   Allergen Reactions    Sulfamethoxazole-Trimethoprim Swelling     lips    Penicillins Rash       Prior to Visit Medications    Medication Sig Taking? Authorizing Provider   calcium carbonate (OSCAL) 500 MG TABS tablet Take 500 mg by mouth daily Yes Historical Provider, MD   rOPINIRole (REQUIP) 1 MG tablet take 1 tablet by mouth at bedtime Yes Adolfo Almanzar MD   clopidogrel (PLAVIX) 75 MG tablet take 1 tablet by mouth once daily Yes Adolfo Almanzar MD   levothyroxine (SYNTHROID) 125 MCG tablet take 1 tablet by mouth once daily Yes ARCENIO Dolan   metoprolol tartrate (LOPRESSOR) 25 MG tablet Take 1 tablet by mouth 2 times daily  Patient taking differently: Take 12.5 mg by mouth 2 times daily  Yes Krystal Red DO   albuterol sulfate HFA (PROVENTIL HFA) 108 (90 Base) MCG/ACT inhaler Inhale 2 puffs into the lungs every 6 hours as needed for Wheezing or Shortness of Breath Yes NORMAN Cueva - CNP   atorvastatin (LIPITOR) 10 MG tablet take 1 tablet by mouth once daily Yes Hiram Perkins MD   melatonin 5 MG TABS tablet Take 5 mg by mouth nightly Yes Historical Provider, MD   fluticasone (FLONASE) 50 MCG/ACT nasal spray 2 sprays to each nostril once daily. Yes Olga Reyes APRN - CNP   aspirin 81 MG tablet Take 81 mg by mouth daily.  Yes Historical Provider, MD   Vitamin D (CHOLECALCIFEROL) 1000 UNITS CAPS capsule Take 3,000 Units by mouth daily  Yes Historical Provider, MD   Multiple Vitamin (MULTI-VITAMINS PO) Take 1 tablet by mouth Daily.  Yes Historical Provider, MD       Past Medical History:   Diagnosis Date    Acute confusion     CVA (cerebral infarction)     Hyperlipidemia     Hypothyroidism     Lacunar infarction ([facility] [address]     Memory loss due to medical condition     Meralgia paresthetica     left side    Myopia with astigmatism and presbyopia     Perennial allergic rhinitis     RLS (restless legs syndrome)     TIA (transient ischemic attack) , 2014    with brief confusion    VBI (vertebrobasilar insufficiency)        Past Surgical History:   Procedure Laterality Date    CARDIAC CATHETERIZATION  2014    [facility]   nl cath    COLONOSCOPY  2015    mild diverticulosis:  Kari BERNARD    INSERTABLE CARDIAC MONITOR  16    removed    PRE-MALIGNANT / BENIGN SKIN LESION EXCISION      hemangioma removal on right foot 2nd toe    WISDOM TOOTH EXTRACTION  1981       Family History   Problem Relation Age of Onset    Stroke Mother     Other Mother          from pneumonia    Liver Disease Father         due to alcohol    Heart Disease Father         valve disease    Diabetes Father     Thyroid Disease Sister     Cancer Sister         breast cancer older sister    Diabetes Brother     High Cholesterol Sister     Diabetes Brother     Other Paternal Aunt         late onset alzheimers    Glaucoma Neg Hx     Cataracts Neg Hx        CareTeam (Including outside providers/suppliers regularly involved in providing care):   Patient Care Team:  ARCENIO Garcia as PCP - General (Family Medicine)  ARCENIO Garcia [facility] Empaneled Provider    Wt Readings from Last 3 Encounters:   20 188 lb (85.3 kg)   05/15/20 187 lb (84.8 kg)   20 184 lb (83.5 kg)     Vitals:    20 1435   BP: 118/74   Pulse: 87   Resp: 14   SpO2: 95%   Weight: 188 lb (85.3 kg)   Height: 5' 1\" (1.549 m)     Body mass index is 35.52 kg/m². Based upon direct observation of the patient, evaluation of cognition reveals recent and remote memory intact. General Appearance: alert and oriented to person, place and time, well developed and well- nourished, in no acute distress  Skin: warm and dry, no rash or erythema  Head: normocephalic and atraumatic  Eyes: pupils equal, round, and reactive to light, extraocular eye movements intact, conjunctivae normal  ENT: tympanic membrane, external ear and ear canal normal bilaterally, nose without deformity, nasal mucosa and turbinates normal without polyps  Neck: supple and non-tender without mass, no thyromegaly or thyroid nodules, no cervical lymphadenopathy  Pulmonary/Chest: clear to auscultation bilaterally- no wheezes, rales or rhonchi, normal air movement, no respiratory distress  Cardiovascular: normal rate, regular rhythm, normal S1 and S2, no murmurs, rubs, clicks, or gallops, distal pulses intact, no carotid bruits  Abdomen: soft, non-tender, non-distended, normal bowel sounds, no masses or organomegaly  Extremities: no cyanosis, clubbing or edema  Musculoskeletal: normal range of motion, no joint swelling, deformity or tenderness  Neurologic: reflexes normal and symmetric, no cranial nerve deficit, gait, coordination and speech normal    Patient's complete Health Risk Assessment and screening values have been reviewed and are found in Flowsheets. The following problems were reviewed today and where indicated follow up appointments were made and/or referrals ordered. Positive Risk Factor Screenings with Interventions:     Health Habits/Nutrition:  Health Habits/Nutrition  Do you exercise for at least 20 minutes 2-3 times per week?: Yes  Have you lost any weight without trying in the past 3 months?: No  Do you eat fewer than 2 meals per day?: No  Have you seen a dentist within the past year?: Yes  Body mass index is 35.52 kg/m².   Health Habits/Nutrition Interventions:  · Nutritional issues:  educational materials for healthy, well-balanced diet provided    Safety:  Safety  Do you have working smoke detectors?: Yes  Have all throw rugs been removed or fastened?: (!) No  Do you have non-slip mats or surfaces in all bathtubs/showers?: (!) No  Do all of your stairways have a railing or banister?: Yes  Are your doorways, halls and stairs free of clutter?: Yes  Do you always fasten your seatbelt when you are in a car?: Yes  Safety Interventions:  · Home safety tips provided    Personalized Preventive Plan   Current Health Maintenance Status  Immunization History   Administered Date(s) Administered    Hepatitis B 11/18/1997, 12/19/1998, 06/04/1999    Influenza A ([id number]) Vaccine PF IM 02/11/2010    Influenza Vaccine, unspecified formulation 01/21/2016    Influenza, High Dose (Fluzone 65 yrs and older) 01/21/2016, 11/14/2017, 11/06/2018, 09/27/2019    Influenza, Quadv, IM, PF (6 mo and older Fluzone, Flulaval, Fluarix, and 3 yrs and older Afluria) 01/23/2017    Pneumococcal Conjugate 13-valent ([id number]) 01/02/2015    Pneumococcal Polysaccharide ([id number]) 08/30/2013    Tdap (Boostrix, Adacel) 12/21/2009, 01/28/2019    Zoster Live (Zostavax) 08/16/2012    Zoster Recombinant (Shingrix) 05/01/2019, 09/27/2019        Health Maintenance   Topic Date Due    Hepatitis C screen  1950    Annual Wellness Visit (AWV)  05/29/2019    Flu vaccine (1) 09/01/2020    Breast cancer screen  09/26/2020    A1C test (Diabetic or Prediabetic)  02/11/2021    Lipid screen  02/11/2021    TSH testing  02/11/2021    Colon cancer screen colonoscopy  02/25/2025    DTaP/Tdap/Td vaccine (3 - Td) 01/28/2029    DEXA (modify frequency per FRAX score)  Completed    Shingles Vaccine  Completed    Pneumococcal 65+ years Vaccine  Completed    Hepatitis A vaccine  Aged Out    Hepatitis B vaccine  Aged Out    Hib vaccine  Aged Out    Meningococcal (ACWY) vaccine  Aged Out     Recommendations for Preventive Services Due: see orders and patient instructions/AVS.  . Recommended screening schedule for the next 5-10 years is provided to the patient in written form: see Patient Keyana Spence was seen today for medicare aw. Diagnoses and all orders for this visit:    Routine general medical examination at a health care facility                  Subjective:      Patient ID: Mathieu Corcoran is a 65 y.o. female. Patient is seen for medicare wellness visit she is doing well no recent illness. Review of Systems   Constitutional: Negative for appetite change, chills, fatigue and fever. Walks 2 miles a day. HENT: Negative. Negative for congestion, postnasal drip, rhinorrhea, sinus pressure, sinus pain, sneezing, sore throat and trouble swallowing. Eyes: Positive for visual disturbance. ANGELA 9/2019 and scheduled this Sept. With . Having some changes in right eye. It is more blurry in right eye. Respiratory: Negative for cough, chest tightness, shortness of breath and wheezing. Cardiovascular: Negative for chest pain, palpitations and leg swelling. Gastrointestinal: Negative. Negative for diarrhea, nausea and vomiting. Genitourinary: Positive for frequency. Negative for difficulty urinating and dysuria. No breast changes. No pelvic sx. Musculoskeletal: Negative for arthralgias, gait problem and myalgias. Skin: Negative for rash. Dermatology sees Dr. Harpal Escalera in [place] every six months. Neurological: Negative for dizziness, tremors, syncope, speech difficulty, weakness, light-headedness, numbness and headaches. No falls. Psychiatric/Behavioral: Negative for sleep disturbance. The patient is not nervous/anxious. Objective:   Physical Exam  Vitals signs and nursing note reviewed. Constitutional:       General: She is not in acute distress. Appearance: Normal appearance. She is well-developed.  She is not fasting glucose)  Comprehensive Metabolic Panel    Hemoglobin A1C   3. Vision changes  External Referral To Ophthalmology   4. Blurred vision, right eye  External Referral To Ophthalmology   5. Encounter for screening mammogram for breast cancer  INNA ARY DIGITAL SCREEN BILATERAL   6. TIA (transient ischemic attack)     7. Cerebrovascular accident (CVA) due to thrombosis of precerebral artery ([address]     8. Vitamin D deficiency     9. Pure hypercholesterolemia     10. Acquired hypothyroidism             Plan:      dexa due 2 years  cmp hgaic due now   Rest of labs due 2/2021  She will be notified of all results  Follow up six months with me sooner if problems.   Stay active  Answered her questions  Recommend flu shot October 2020  To contact us if she has any questions especially given the current pandemic  Follow-up with specialty as scheduled  Changes in the vision will see if ophthalmology can see her sooner than her scheduled appointment  Immunizations are up-to-date        ARCENIO Terrazas

## 2020-08-05 ENCOUNTER — HOSPITAL ENCOUNTER (OUTPATIENT)
Dept: MAMMOGRAPHY | Age: 70
Discharge: HOME | End: 2020-08-07
Payer: MEDICARE

## 2020-08-05 PROCEDURE — 77063 BREAST TOMOSYNTHESIS BI: CPT

## 2020-08-14 ASSESSMENT — ENCOUNTER SYMPTOMS
TROUBLE SWALLOWING: 0
CHEST TIGHTNESS: 0
SORE THROAT: 0
SINUS PAIN: 0
SINUS PRESSURE: 0
RHINORRHEA: 0

## 2020-08-20 ENCOUNTER — OFFICE VISIT (OUTPATIENT)
Dept: OPHTHALMOLOGY | Age: 70
End: 2020-08-20
Payer: MEDICARE

## 2020-08-20 PROCEDURE — 92004 COMPRE OPH EXAM NEW PT 1/>: CPT | Performed by: OPHTHALMOLOGY

## 2020-08-20 PROCEDURE — G8427 DOCREV CUR MEDS BY ELIG CLIN: HCPCS | Performed by: OPHTHALMOLOGY

## 2020-08-20 PROCEDURE — G8417 CALC BMI ABV UP PARAM F/U: HCPCS | Performed by: OPHTHALMOLOGY

## 2020-08-20 PROCEDURE — 1036F TOBACCO NON-USER: CPT | Performed by: OPHTHALMOLOGY

## 2020-08-20 RX ORDER — MOXIFLOXACIN 5 MG/ML
SOLUTION/ DROPS OPHTHALMIC
Qty: 1 BOTTLE | Refills: 1 | Status: SHIPPED | OUTPATIENT
Start: 2020-08-20 | End: 2020-12-01 | Stop reason: ALTCHOICE

## 2020-08-20 RX ORDER — PREDNISOLONE ACETATE 10 MG/ML
SUSPENSION/ DROPS OPHTHALMIC
Qty: 1 BOTTLE | Refills: 1 | Status: SHIPPED | OUTPATIENT
Start: 2020-08-20 | End: 2020-12-01 | Stop reason: ALTCHOICE

## 2020-08-20 ASSESSMENT — VISUAL ACUITY
OD_PH_CC+: -1
OD_PH_CC: 20/30
CORRECTION_TYPE: CONTACTS
OS_CC: 20/20
METHOD: SNELLEN - LINEAR
OS_BAT_HIGH: 20/100
OD_BAT_HIGH: 20/100

## 2020-08-20 ASSESSMENT — SLIT LAMP EXAM - LIDS
COMMENTS: NORMAL
COMMENTS: NORMAL

## 2020-08-20 ASSESSMENT — CONF VISUAL FIELD
METHOD: COUNTING FINGERS
OD_NORMAL: 1
OS_NORMAL: 1

## 2020-08-20 ASSESSMENT — ENCOUNTER SYMPTOMS
ALLERGIC/IMMUNOLOGIC NEGATIVE: 0
EYES NEGATIVE: 0
RESPIRATORY NEGATIVE: 0
GASTROINTESTINAL NEGATIVE: 0

## 2020-08-20 ASSESSMENT — TONOMETRY
IOP_METHOD: TONOPEN
OD_IOP_MMHG: 18
OS_IOP_MMHG: 17
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
## 2020-09-01 ENCOUNTER — OFFICE VISIT (OUTPATIENT)
Dept: OPHTHALMOLOGY | Age: 70
End: 2020-09-01
Payer: MEDICARE

## 2020-09-01 PROCEDURE — 99211 OFF/OP EST MAY X REQ PHY/QHP: CPT

## 2020-09-01 PROCEDURE — 99999 PR OFFICE/OUTPT VISIT,PROCEDURE ONLY: CPT | Performed by: OPHTHALMOLOGY

## 2020-09-01 NOTE — PROGRESS NOTES
[address] [address]  Dept: [phone]  Dept Fax: [phone]: [phone]       Patient is here today for biometry. Referral: Dr. Kiersten Garcia  Prednisolone Acetate and Moxifloxacin eyedrops were sent to patients pharmacy.    OD is scheduled on 9/29/20          Following cataract surgery use:    Moxifloxacin  Use 1 drop in surgery eye 4 times a day for 1 week  Then STOP     Prednisolone Acetate   Use 1 drop in surgery eye 4 times a day for 1 week  Then 1 drop in surgery eye 3 times a day for 1 week  Then 1 drop in surgery eye 2 times a day for 1 week  Then 1 drop in surgery eye once daily for 1 week  Then STOP

## 2020-09-08 ENCOUNTER — OFFICE VISIT (OUTPATIENT)
Dept: FAMILY MEDICINE CLINIC | Age: 70
End: 2020-09-08
Payer: MEDICARE

## 2020-09-08 VITALS
HEART RATE: 72 BPM | DIASTOLIC BLOOD PRESSURE: 78 MMHG | WEIGHT: 189 LBS | HEIGHT: 61 IN | SYSTOLIC BLOOD PRESSURE: 112 MMHG | BODY MASS INDEX: 35.68 KG/M2 | TEMPERATURE: 97.9 F

## 2020-09-08 PROBLEM — R42 DIZZINESS: Status: RESOLVED | Noted: 2018-11-12 | Resolved: 2020-09-08

## 2020-09-08 PROCEDURE — 99214 OFFICE O/P EST MOD 30 MIN: CPT | Performed by: PHYSICIAN ASSISTANT

## 2020-09-08 PROCEDURE — G8399 PT W/DXA RESULTS DOCUMENT: HCPCS | Performed by: PHYSICIAN ASSISTANT

## 2020-09-08 PROCEDURE — G8417 CALC BMI ABV UP PARAM F/U: HCPCS | Performed by: PHYSICIAN ASSISTANT

## 2020-09-08 PROCEDURE — 1036F TOBACCO NON-USER: CPT | Performed by: PHYSICIAN ASSISTANT

## 2020-09-08 PROCEDURE — G8427 DOCREV CUR MEDS BY ELIG CLIN: HCPCS | Performed by: PHYSICIAN ASSISTANT

## 2020-09-08 PROCEDURE — 1123F ACP DISCUSS/DSCN MKR DOCD: CPT | Performed by: PHYSICIAN ASSISTANT

## 2020-09-08 PROCEDURE — 3017F COLORECTAL CA SCREEN DOC REV: CPT | Performed by: PHYSICIAN ASSISTANT

## 2020-09-08 PROCEDURE — 99213 OFFICE O/P EST LOW 20 MIN: CPT

## 2020-09-08 PROCEDURE — 1090F PRES/ABSN URINE INCON ASSESS: CPT | Performed by: PHYSICIAN ASSISTANT

## 2020-09-08 PROCEDURE — 4040F PNEUMOC VAC/ADMIN/RCVD: CPT | Performed by: PHYSICIAN ASSISTANT

## 2020-09-08 ASSESSMENT — ENCOUNTER SYMPTOMS
SHORTNESS OF BREATH: 0
DIARRHEA: 0
WHEEZING: 0
NAUSEA: 0
COUGH: 0
VOMITING: 0

## 2020-09-08 NOTE — PROGRESS NOTES
[facility]    Subjective:      Patient ID: Noa Gibbons is a 65 y.o. y.o. female. Patient is seen for preop H & P for right cataract removal first then later the left eye here at [facility] 2020. This is with Dr. Jerrica Rushing.            Past Medical History:   Diagnosis Date    Acute confusion     CVA (cerebral infarction)     Hyperlipidemia     Hypothyroidism     Lacunar infarction ([address]     Memory loss due to medical condition     Meralgia paresthetica     left side    Myopia with astigmatism and presbyopia     Perennial allergic rhinitis     RLS (restless legs syndrome)     TIA (transient ischemic attack) , 2014    with brief confusion    VBI (vertebrobasilar insufficiency)        Past Surgical History:   Procedure Laterality Date    CARDIAC CATHETERIZATION  2014    [facility]   nl cath    COLONOSCOPY  2015    mild diverticulosis:  Kari BERNARD    INSERTABLE CARDIAC MONITOR  16    removed    PRE-MALIGNANT / BENIGN SKIN LESION EXCISION      hemangioma removal on right foot 2nd toe    WISDOM TOOTH EXTRACTION  1981       Family History   Problem Relation Age of Onset    Stroke Mother     Other Mother          from pneumonia    Liver Disease Father         due to alcohol    Heart Disease Father         valve disease    Diabetes Father     Thyroid Disease Sister     Cancer Sister         breast cancer older sister    Diabetes Brother     High Cholesterol Sister     Diabetes Brother     Other Paternal Aunt         late onset alzheimers    Glaucoma Neg Hx     Cataracts Neg Hx        Allergies   Allergen Reactions    Sulfamethoxazole-Trimethoprim Swelling     lips    Penicillins Rash       Current Outpatient Medications   Medication Sig Dispense Refill    calcium carbonate (OSCAL) 500 MG TABS tablet Take 500 mg by mouth daily      rOPINIRole (REQUIP) 1 MG tablet take 1 tablet by mouth at bedtime 90 tablet 1    clopidogrel (PLAVIX) 75 MG tablet take 1 tablet by mouth once daily 90 tablet 1    levothyroxine (SYNTHROID) 125 MCG tablet take 1 tablet by mouth once daily 90 tablet 1    metoprolol tartrate (LOPRESSOR) 25 MG tablet Take 1 tablet by mouth 2 times daily (Patient taking differently: Take 12.5 mg by mouth 2 times daily ) 180 tablet 3    albuterol sulfate HFA (PROVENTIL HFA) 108 (90 Base) MCG/ACT inhaler Inhale 2 puffs into the lungs every 6 hours as needed for Wheezing or Shortness of Breath 1 Inhaler 0    atorvastatin (LIPITOR) 10 MG tablet take 1 tablet by mouth once daily 30 tablet 12    melatonin 5 MG TABS tablet Take 5 mg by mouth nightly      fluticasone (FLONASE) 50 MCG/ACT nasal spray 2 sprays to each nostril once daily. 1 Bottle 11    aspirin 81 MG tablet Take 81 mg by mouth daily.  Vitamin D (CHOLECALCIFEROL) 1000 UNITS CAPS capsule Take 3,000 Units by mouth daily       Multiple Vitamin (MULTI-VITAMINS PO) Take 1 tablet by mouth Daily.  moxifloxacin (VIGAMOX) 0.5 % ophthalmic solution Apply 1 drop to surgery eye 4 times daily for 7 days then discontinue (Patient not taking: Reported on 9/8/2020) 1 Bottle 1    prednisoLONE acetate (PRED FORTE) 1 % ophthalmic suspension Apply 1 drop to surgery eye 4 times daily for 7 days. Then 1 drop 3 times daily for 7 days. Then 1 drop 2 times daily for 7 days. Then 1 drop daily for 7 days. (Patient not taking: Reported on 9/8/2020) 1 Bottle 1     No current facility-administered medications for this visit. Review of Systems   Constitutional: Negative for appetite change, fatigue and fever. Eyes: Positive for visual disturbance. Respiratory: Negative for cough, shortness of breath and wheezing. Cardiovascular: Negative for chest pain and palpitations. Gastrointestinal: Negative for diarrhea, nausea and vomiting. Genitourinary: Negative for difficulty urinating. Musculoskeletal: Negative for myalgias. Skin: Negative for rash. Neurological: Negative for dizziness, light-headedness, numbness and headaches. Psychiatric/Behavioral: Negative for sleep disturbance. The patient is not nervous/anxious. Objective:      /78 (Site: Left Upper Arm, Position: Sitting, Cuff Size: Medium Adult)   Pulse 72   Temp 97.9 °F (36.6 °C)   Ht 5' 1\" (1.549 m)   Wt 189 lb (85.7 kg)   LMP 01/01/1998   BMI 35.71 kg/m²     Physical Exam  Vitals signs and nursing note reviewed. Constitutional:       General: She is not in acute distress. Appearance: Normal appearance. She is well-developed. She is not ill-appearing. HENT:      Head: Normocephalic and atraumatic. Right Ear: Tympanic membrane, ear canal and external ear normal.      Left Ear: Tympanic membrane, ear canal and external ear normal.      Nose: Nose normal. No congestion or rhinorrhea. Mouth/Throat:      Mouth: Mucous membranes are moist.      Pharynx: No oropharyngeal exudate or posterior oropharyngeal erythema. Eyes:      General: No scleral icterus. Extraocular Movements: Extraocular movements intact. Conjunctiva/sclera: Conjunctivae normal.      Pupils: Pupils are equal, round, and reactive to light. Comments: Cataracts noted in both eyes worse on left. Neck:      Musculoskeletal: Normal range of motion and neck supple. No neck rigidity or muscular tenderness. Thyroid: No thyroid mass, thyromegaly or thyroid tenderness. Vascular: No carotid bruit. Cardiovascular:      Rate and Rhythm: Normal rate and regular rhythm. Heart sounds: Normal heart sounds. No murmur. Pulmonary:      Effort: Pulmonary effort is normal. No respiratory distress. Breath sounds: Normal breath sounds. No wheezing, rhonchi or rales. Abdominal:      General: Bowel sounds are normal. There is no distension. Palpations: Abdomen is soft. There is no mass. Tenderness: There is no abdominal tenderness. There is no guarding or rebound. Hernia: No hernia is present. Musculoskeletal:         General: No swelling, tenderness, deformity or signs of injury. Right lower leg: No edema. Left lower leg: No edema. Lymphadenopathy:      Cervical: No cervical adenopathy. Skin:     General: Skin is warm and dry. Findings: No erythema, lesion or rash. Comments: Rosacea noted. Neurological:      General: No focal deficit present. Mental Status: She is alert and oriented to person, place, and time. Sensory: No sensory deficit. Motor: No weakness. Gait: Gait normal.   Psychiatric:         Mood and Affect: Mood normal.         Behavior: Behavior normal.         Thought Content: Thought content normal.         Judgment: Judgment normal.       No visits with results within 1 Month(s) from this visit. Latest known visit with results is:   Hospital Outpatient Visit on 03/16/2020   Component Date Value Ref Range Status    Specimen Description 03/16/2020 . NASOPHARYNGEAL SWAB   Final    Special Requests 03/16/2020 NOT REPORTED   Final    Direct Exam 03/16/2020 POSITIVE for Influenza A Antigen*  Final    Direct Exam 03/16/2020 NEGATIVE FOR INFLUENZA B ANTIGEN. * A negative result does not exclude Influenza infection. Negative results should be confirmed by PCR testing. Final     Lab Results   Component Value Date     (H) 02/05/2019    K 4.1 02/05/2019     02/05/2019    CO2 28 02/05/2019    BUN 16 02/05/2019    CREATININE 0.70 02/05/2019    GLUCOSE 106 (H) 02/05/2019    CALCIUM 9.6 02/05/2019    PROT 7.5 02/05/2019    LABALBU 4.4 02/05/2019    BILITOT 1.10 02/05/2019    ALKPHOS 101 02/05/2019    AST 22 02/05/2019    ALT 24 02/05/2019    LABGLOM >60 02/05/2019    GFRAA >60 02/05/2019       This SmartLink has not been configured with any valid records.      Lab Results   Component Value Date    CHOL 166 02/05/2019    CHOL 175 07/23/2018    CHOL 163 07/20/2017     Lab Results   Component Value Date    TRIG 130 02/05/2019    TRIG 151 (H) 07/23/2018    TRIG 109 07/20/2017     Lab Results   Component Value Date    HDL 46 02/11/2020    HDL 52 02/05/2019    HDL 54 07/23/2018     Lab Results   Component Value Date    LDLCHOLESTEROL 93 02/11/2020    LDLCHOLESTEROL 88 02/05/2019    LDLCHOLESTEROL 91 07/23/2018     Lab Results   Component Value Date    VLDL NOT REPORTED 02/11/2020    VLDL NOT REPORTED 02/05/2019    VLDL NOT REPORTED 07/23/2018     Lab Results   Component Value Date    CHOLHDLRATIO 3.5 02/11/2020    CHOLHDLRATIO 3.2 02/05/2019    CHOLHDLRATIO 3.2 07/23/2018           Assessment & Plan:     1. Preop examination      2. Age-related cataract of both eyes, unspecified age-related cataract type      3. IFG (impaired fasting glucose)      4. Pure hypercholesterolemia      5. Acquired hypothyroidism      6. Cerebrovascular accident (CVA) due to thrombosis of precerebral artery ([address]  stable    7. TIA (transient ischemic attack)  stable        Cleared for surgery  Hold plavix 5 days  Hold ASA 5-7 days  Hold vitamins for 2 weeks  Discussed meds to hold and to take.   Answered her questions  Follow up as scheduled 2/2021 sooner if problems  Flu shot recommended [facility] [address] [place] [address]  9/8/2020 10:35 AM EDT    (Pleasenote that portions of this note were completed with a voice recognition program.Efforts were made to edit the dictations but occasionally words are mis-transcribed.)

## 2020-09-21 ENCOUNTER — OFFICE VISIT (OUTPATIENT)
Dept: PRIMARY CARE CLINIC | Age: 70
End: 2020-09-21
Payer: MEDICARE

## 2020-09-21 VITALS
HEART RATE: 80 BPM | TEMPERATURE: 96.8 F | RESPIRATION RATE: 16 BRPM | BODY MASS INDEX: 35.12 KG/M2 | DIASTOLIC BLOOD PRESSURE: 84 MMHG | WEIGHT: 186 LBS | HEIGHT: 61 IN | SYSTOLIC BLOOD PRESSURE: 120 MMHG | OXYGEN SATURATION: 98 %

## 2020-09-21 PROCEDURE — 1036F TOBACCO NON-USER: CPT | Performed by: PHYSICIAN ASSISTANT

## 2020-09-21 PROCEDURE — 99213 OFFICE O/P EST LOW 20 MIN: CPT | Performed by: PHYSICIAN ASSISTANT

## 2020-09-21 PROCEDURE — 99212 OFFICE O/P EST SF 10 MIN: CPT

## 2020-09-21 PROCEDURE — G8399 PT W/DXA RESULTS DOCUMENT: HCPCS | Performed by: PHYSICIAN ASSISTANT

## 2020-09-21 PROCEDURE — 3017F COLORECTAL CA SCREEN DOC REV: CPT | Performed by: PHYSICIAN ASSISTANT

## 2020-09-21 PROCEDURE — G8417 CALC BMI ABV UP PARAM F/U: HCPCS | Performed by: PHYSICIAN ASSISTANT

## 2020-09-21 PROCEDURE — 4040F PNEUMOC VAC/ADMIN/RCVD: CPT | Performed by: PHYSICIAN ASSISTANT

## 2020-09-21 PROCEDURE — 1090F PRES/ABSN URINE INCON ASSESS: CPT | Performed by: PHYSICIAN ASSISTANT

## 2020-09-21 PROCEDURE — G8427 DOCREV CUR MEDS BY ELIG CLIN: HCPCS | Performed by: PHYSICIAN ASSISTANT

## 2020-09-21 PROCEDURE — 1123F ACP DISCUSS/DSCN MKR DOCD: CPT | Performed by: PHYSICIAN ASSISTANT

## 2020-09-21 RX ORDER — AZITHROMYCIN 250 MG/1
250 TABLET, FILM COATED ORAL SEE ADMIN INSTRUCTIONS
Qty: 6 TABLET | Refills: 0 | Status: SHIPPED | OUTPATIENT
Start: 2020-09-21 | End: 2020-09-26

## 2020-09-21 RX ORDER — BENZONATATE 200 MG/1
200 CAPSULE ORAL 3 TIMES DAILY PRN
Qty: 15 CAPSULE | Refills: 1 | Status: SHIPPED | OUTPATIENT
Start: 2020-09-21 | End: 2020-09-28

## 2020-09-21 ASSESSMENT — ENCOUNTER SYMPTOMS
WHEEZING: 1
RHINORRHEA: 1
SORE THROAT: 0
COUGH: 1
SHORTNESS OF BREATH: 0

## 2020-09-21 ASSESSMENT — PATIENT HEALTH QUESTIONNAIRE - PHQ9
1. LITTLE INTEREST OR PLEASURE IN DOING THINGS: 0
SUM OF ALL RESPONSES TO PHQ9 QUESTIONS 1 & 2: 0
SUM OF ALL RESPONSES TO PHQ QUESTIONS 1-9: 0
2. FEELING DOWN, DEPRESSED OR HOPELESS: 0
SUM OF ALL RESPONSES TO PHQ QUESTIONS 1-9: 0

## 2020-09-21 NOTE — PROGRESS NOTES
Subjective:      Patient ID: Sebastian Rubinstein is a 65 y.o. female. Cough   This is a new problem. The current episode started 1 to 4 weeks ago. The problem has been waxing and waning. The cough is productive of sputum (green sputum). Associated symptoms include ear congestion, a fever (Tmax 99.9), myalgias, rhinorrhea and wheezing. Pertinent negatives include no ear pain, headaches, sore throat or shortness of breath. She has tried a beta-agonist inhaler and OTC cough suppressant for the symptoms. The treatment provided moderate relief. Her past medical history is significant for bronchitis. Patient has pre-op COVID testing today. Review of Systems   Constitutional: Positive for fever (Tmax 99.9). HENT: Positive for rhinorrhea. Negative for ear pain and sore throat. Respiratory: Positive for cough and wheezing. Negative for shortness of breath. Musculoskeletal: Positive for myalgias. Neurological: Negative for headaches. Objective:   Physical Exam  HENT:      Head: Normocephalic. Right Ear: Tympanic membrane is erythematous. Left Ear: Tympanic membrane normal.      Nose: Rhinorrhea present. Mouth/Throat:      Mouth: Mucous membranes are moist.   Eyes:      Pupils: Pupils are equal, round, and reactive to light. Neck:      Musculoskeletal: Neck supple. Cardiovascular:      Rate and Rhythm: Normal rate. Pulmonary:      Effort: Pulmonary effort is normal.      Comments: Coarse breath sounds which clear post-tussively  Neurological:      General: No focal deficit present. Mental Status: She is alert. Psychiatric:         Mood and Affect: Mood normal.         Recent Travel Screening and Travel History documentation:     Travel Screening     Question   Response    In the last month, have you been in contact with someone who was confirmed or suspected to have Coronavirus / COVID-19? No / Unsure    Do you have any of the following symptoms?   Cough    Have you traveled internationally in the last month? No      Travel History   Travel since 08/21/20     No documented travel since 08/21/20           Assessment:      1. Bronchitis    2. Non-recurrent acute suppurative otitis media of right ear without spontaneous rupture of tympanic membrane          Plan:      Patient has pre-operative COVID testing scheduled. ZPak. Fluids/Rest.  Tylenol. Nasal saline. Tessalon PRN cough. Supportive care advised. Follow-up PRN/as planned with PCP.         ARCENIO Lee

## 2020-09-22 ENCOUNTER — TELEPHONE (OUTPATIENT)
Dept: PREADMISSION TESTING | Age: 70
End: 2020-09-22

## 2020-09-23 ENCOUNTER — PRE-PROCEDURE TELEPHONE (OUTPATIENT)
Dept: PREADMISSION TESTING | Age: 70
End: 2020-09-23

## 2020-09-23 NOTE — TELEPHONE ENCOUNTER
Patient called surgery registration, confirmed Covid swab for Sept 24 at [facility].

## 2020-09-24 ENCOUNTER — HOSPITAL ENCOUNTER (OUTPATIENT)
Dept: PREADMISSION TESTING | Age: 70
Setting detail: SPECIMEN
Discharge: HOME | End: 2020-09-28
Payer: MEDICARE

## 2020-09-24 PROCEDURE — U0003 INFECTIOUS AGENT DETECTION BY NUCLEIC ACID (DNA OR RNA); SEVERE ACUTE RESPIRATORY SYNDROME CORONAVIRUS 2 (SARS-COV-2) (CORONAVIRUS DISEASE [COVID-19]), AMPLIFIED PROBE TECHNIQUE, MAKING USE OF HIGH THROUGHPUT TECHNOLOGIES AS DESCRIBED BY CMS-2020-01-R: HCPCS

## 2020-09-26 LAB — SARS-COV-2, NAA: NOT DETECTED

## 2020-09-27 ENCOUNTER — TELEPHONE (OUTPATIENT)
Dept: PRIMARY CARE CLINIC | Age: 70
End: 2020-09-27

## 2020-09-29 ENCOUNTER — ANESTHESIA EVENT (OUTPATIENT)
Dept: OPERATING ROOM | Age: 70
End: 2020-09-29
Payer: MEDICARE

## 2020-09-29 ENCOUNTER — ANESTHESIA (OUTPATIENT)
Dept: OPERATING ROOM | Age: 70
End: 2020-09-29
Payer: MEDICARE

## 2020-09-29 ENCOUNTER — HOSPITAL ENCOUNTER (OUTPATIENT)
Age: 70
Setting detail: OUTPATIENT SURGERY
Discharge: HOME | End: 2020-09-29
Attending: OPHTHALMOLOGY | Admitting: OPHTHALMOLOGY
Payer: MEDICARE

## 2020-09-29 VITALS
WEIGHT: 184 LBS | TEMPERATURE: 98 F | SYSTOLIC BLOOD PRESSURE: 111 MMHG | BODY MASS INDEX: 34.74 KG/M2 | HEART RATE: 82 BPM | OXYGEN SATURATION: 95 % | RESPIRATION RATE: 16 BRPM | HEIGHT: 61 IN | DIASTOLIC BLOOD PRESSURE: 58 MMHG

## 2020-09-29 VITALS
SYSTOLIC BLOOD PRESSURE: 133 MMHG | OXYGEN SATURATION: 100 % | RESPIRATION RATE: 21 BRPM | DIASTOLIC BLOOD PRESSURE: 57 MMHG | TEMPERATURE: 98.1 F

## 2020-09-29 PROCEDURE — 6370000000 HC RX 637 (ALT 250 FOR IP): Performed by: OPHTHALMOLOGY

## 2020-09-29 PROCEDURE — 3700000001 HC ADD 15 MINUTES (ANESTHESIA): Performed by: OPHTHALMOLOGY

## 2020-09-29 PROCEDURE — 3700000000 HC ANESTHESIA ATTENDED CARE: Performed by: OPHTHALMOLOGY

## 2020-09-29 PROCEDURE — 3600000002 HC SURGERY LEVEL 2 BASE: Performed by: OPHTHALMOLOGY

## 2020-09-29 PROCEDURE — 2500000003 HC RX 250 WO HCPCS: Performed by: OPHTHALMOLOGY

## 2020-09-29 PROCEDURE — V2632 POST CHMBR INTRAOCULAR LENS: HCPCS | Performed by: OPHTHALMOLOGY

## 2020-09-29 PROCEDURE — 2709999900 HC NON-CHARGEABLE SUPPLY: Performed by: OPHTHALMOLOGY

## 2020-09-29 PROCEDURE — 7100000010 HC PHASE II RECOVERY - FIRST 15 MIN: Performed by: OPHTHALMOLOGY

## 2020-09-29 PROCEDURE — 6360000002 HC RX W HCPCS: Performed by: OPHTHALMOLOGY

## 2020-09-29 PROCEDURE — 3600000012 HC SURGERY LEVEL 2 ADDTL 15MIN: Performed by: OPHTHALMOLOGY

## 2020-09-29 PROCEDURE — 7100000011 HC PHASE II RECOVERY - ADDTL 15 MIN: Performed by: OPHTHALMOLOGY

## 2020-09-29 PROCEDURE — 2580000003 HC RX 258: Performed by: NURSE ANESTHETIST, CERTIFIED REGISTERED

## 2020-09-29 PROCEDURE — 6360000002 HC RX W HCPCS: Performed by: NURSE ANESTHETIST, CERTIFIED REGISTERED

## 2020-09-29 DEVICE — IMPLANTABLE DEVICE: Type: IMPLANTABLE DEVICE | Site: EYE | Status: FUNCTIONAL

## 2020-09-29 RX ORDER — TROPICAMIDE 10 MG/ML
1 SOLUTION/ DROPS OPHTHALMIC SEE ADMIN INSTRUCTIONS
Status: DISCONTINUED | OUTPATIENT
Start: 2020-09-29 | End: 2020-09-29 | Stop reason: HOSPADM

## 2020-09-29 RX ORDER — EPINEPHRINE 1 MG/ML
INJECTION, SOLUTION, CONCENTRATE INTRAVENOUS PRN
Status: DISCONTINUED | OUTPATIENT
Start: 2020-09-29 | End: 2020-09-29 | Stop reason: ALTCHOICE

## 2020-09-29 RX ORDER — PHENYLEPHRINE HCL 2.5 %
1 DROPS OPHTHALMIC (EYE) SEE ADMIN INSTRUCTIONS
Status: DISCONTINUED | OUTPATIENT
Start: 2020-09-29 | End: 2020-09-29 | Stop reason: HOSPADM

## 2020-09-29 RX ORDER — MIDAZOLAM HYDROCHLORIDE 1 MG/ML
INJECTION INTRAMUSCULAR; INTRAVENOUS PRN
Status: DISCONTINUED | OUTPATIENT
Start: 2020-09-29 | End: 2020-09-29 | Stop reason: SDUPTHER

## 2020-09-29 RX ORDER — TETRACAINE HYDROCHLORIDE 5 MG/ML
1 SOLUTION OPHTHALMIC SEE ADMIN INSTRUCTIONS
Status: DISCONTINUED | OUTPATIENT
Start: 2020-09-29 | End: 2020-09-29 | Stop reason: HOSPADM

## 2020-09-29 RX ORDER — LIDOCAINE HYDROCHLORIDE 20 MG/ML
JELLY TOPICAL PRN
Status: DISCONTINUED | OUTPATIENT
Start: 2020-09-29 | End: 2020-09-29 | Stop reason: ALTCHOICE

## 2020-09-29 RX ORDER — SODIUM CHLORIDE 0.9 % (FLUSH) 0.9 %
SYRINGE (ML) INJECTION PRN
Status: DISCONTINUED | OUTPATIENT
Start: 2020-09-29 | End: 2020-09-29 | Stop reason: SDUPTHER

## 2020-09-29 RX ORDER — DEXAMETHASONE PHOSPHATE - MOXIFLOXACIN - KETOROLAC TROMETHAMINE 1; .5; .4 MG/ML; MG/ML; MG/ML
INJECTION, SOLUTION OPHTHALMIC PRN
Status: DISCONTINUED | OUTPATIENT
Start: 2020-09-29 | End: 2020-09-29 | Stop reason: ALTCHOICE

## 2020-09-29 RX ADMIN — PHENYLEPHRINE HYDROCHLORIDE 1 DROP: 25 SOLUTION/ DROPS OPHTHALMIC at 12:47

## 2020-09-29 RX ADMIN — Medication 10 ML: at 13:19

## 2020-09-29 RX ADMIN — TETRACAINE HYDROCHLORIDE 1 DROP: 5 SOLUTION OPHTHALMIC at 12:47

## 2020-09-29 RX ADMIN — TROPICAMIDE 1 DROP: 10 SOLUTION/ DROPS OPHTHALMIC at 12:37

## 2020-09-29 RX ADMIN — TETRACAINE HYDROCHLORIDE 1 DROP: 5 SOLUTION OPHTHALMIC at 12:42

## 2020-09-29 RX ADMIN — MIDAZOLAM HYDROCHLORIDE 2 MG: 1 INJECTION, SOLUTION INTRAMUSCULAR; INTRAVENOUS at 13:19

## 2020-09-29 RX ADMIN — TROPICAMIDE 1 DROP: 10 SOLUTION/ DROPS OPHTHALMIC at 12:47

## 2020-09-29 RX ADMIN — TETRACAINE HYDROCHLORIDE 1 DROP: 5 SOLUTION OPHTHALMIC at 12:37

## 2020-09-29 RX ADMIN — TROPICAMIDE 1 DROP: 10 SOLUTION/ DROPS OPHTHALMIC at 12:42

## 2020-09-29 RX ADMIN — PHENYLEPHRINE HYDROCHLORIDE 1 DROP: 25 SOLUTION/ DROPS OPHTHALMIC at 12:42

## 2020-09-29 RX ADMIN — PHENYLEPHRINE HYDROCHLORIDE 1 DROP: 25 SOLUTION/ DROPS OPHTHALMIC at 12:37

## 2020-09-29 ASSESSMENT — PAIN DESCRIPTION - DESCRIPTORS: DESCRIPTORS: BURNING

## 2020-09-29 ASSESSMENT — PAIN - FUNCTIONAL ASSESSMENT: PAIN_FUNCTIONAL_ASSESSMENT: 0-10

## 2020-09-29 ASSESSMENT — PAIN SCALES - GENERAL
PAINLEVEL_OUTOF10: 0
PAINLEVEL_OUTOF10: 1

## 2020-09-29 ASSESSMENT — PAIN DESCRIPTION - PAIN TYPE: TYPE: SURGICAL PAIN

## 2020-09-29 ASSESSMENT — PAIN DESCRIPTION - ORIENTATION: ORIENTATION: RIGHT

## 2020-09-29 NOTE — H&P
Ginger [place]    Physician Assistant    Specialty:  Physician Assistant    Progress Notes       Signed    Encounter Date:  9/21/2020          Related encounter: Office Visit from 9/21/2020 in [address]  [facility]          Signed              Show:Clear all  [x]Manual[x]Template[]Copied    Added by:  [x]ARCENIO Milian    []Jennifer for details  Subjective:      Patient ID: Duy Shin is a 65 y.o. female.     Cough   This is a new problem. The current episode started 1 to 4 weeks ago. The problem has been waxing and waning. The cough is productive of sputum (green sputum). Associated symptoms include ear congestion, a fever (Tmax 99.9), myalgias, rhinorrhea and wheezing. Pertinent negatives include no ear pain, headaches, sore throat or shortness of breath. She has tried a beta-agonist inhaler and OTC cough suppressant for the symptoms. The treatment provided moderate relief. Her past medical history is significant for bronchitis.      Patient has pre-op COVID testing today.     Review of Systems   Constitutional: Positive for fever (Tmax 99.9). HENT: Positive for rhinorrhea. Negative for ear pain and sore throat. Respiratory: Positive for cough and wheezing. Negative for shortness of breath. Musculoskeletal: Positive for myalgias. Neurological: Negative for headaches.         Objective:   Physical Exam  HENT:      Head: Normocephalic. Right Ear: Tympanic membrane is erythematous. Left Ear: Tympanic membrane normal.      Nose: Rhinorrhea present. Mouth/Throat:      Mouth: Mucous membranes are moist.   Eyes:      Pupils: Pupils are equal, round, and reactive to light. Neck:      Musculoskeletal: Neck supple. Cardiovascular:      Rate and Rhythm: Normal rate. Pulmonary:      Effort: Pulmonary effort is normal.      Comments: Coarse breath sounds which clear post-tussively  Neurological:      General: No focal deficit present. Mental Status: She is alert. Psychiatric:         Mood and Affect: Mood normal.            Recent Travel Screening and Travel History documentation:             Travel Screening      Question   Response     In the last month, have you been in contact with someone who was confirmed or suspected to have Coronavirus / COVID-19?   No / Unsure     Do you have any of the following symptoms?   Cough     Have you traveled internationally in the last month?   No                 Travel History   Travel since 08/21/20      No documented travel since 08/21/20             Assessment:   1. Bronchitis    2. Non-recurrent acute suppurative otitis media of right ear without spontaneous rupture of tympanic membrane                     Plan:   Patient has pre-operative COVID testing scheduled. ZPak. Fluids/Rest.  Tylenol. Nasal saline. Tessalon PRN cough. Supportive care advised. Follow-up PRN/as planned with PCP.                   ARCENIO HANDY              I have reviewed the history and physical. I examined the patient and find no relevant changes. I have reviewed with the patient the risks, benefits and alternatives to the planned procedures.      Electronically signed by Tristan Hurst MD on 9/29/2020 at 12:37 PM

## 2020-09-29 NOTE — OP NOTE
Surgical Operative Report  Date of procedure: 09/29/20 1:42 PM  Preoperative Diagnosis: Visually significant cataract  OD  Postoperative Diagosis:  same  Procedure: Cataract extraction with intraocular lens OD  Anesthesia:  topical proparacaine, lidocaine jelly 2%, intracameral Lidocaine 1%/Phenylephrine 1.5%  Estimated blood loss: Less than 5 cc  Indications for procedure: The patient reports significant difficulties with activities of daily living secondary to cataract formation. The patient understands the risks, benefits, and alternatives of cataract surgery as stated in the informed consent. The patient wishes to proceed with cataract surgery without reservation. Operative Summary:  Informed consent was reviewed and the operative site was confirmed by the patient and marked by the physician in the preop area. Topical proparacaine was instilled onto the operative eye and the patient was prepped and draped in the usual sterile fashion in the OR. A lid speculum was placed in the eye and topical lidocaine jelly 2% was placed on the cornea. LRI  @ with an AK blade          [x] No LRI performed     A paracentesis site was created temporally, and the anterior chamber was filled with Lidocaine 1%/Phenylephrine 1.5%. Viscoat was then placed in the anterior chamber and a temporal clear cornea incision was created. A cystotome was used to begin a capsulorrhexis that was completed with Utrata forceps. Hydrodissection was performed and the lens was then phacoemulsified with the phacohandpiece. The cortical material was then removed with the I/A handpiece and the capsule was filled with cohesive viscoelastic. A +12.5 [id number] lens was inserted into the capsular bag and rotated into position. The cohesive viscoelastic was removed with the I/A handpiece, and the chamber was deepened with BSS. A 0.1 ml bolus of Moxifloxacin 5mg/ml was instilled into the anterior chamber.   The wounds were hydrated, checked for water tightness. 10-0 Biosorb suture in paracentesis site/keratome wound        [x]No suture     The wounds were appropriately sealed. The speculum was removed and the patient was taken to the recovery area. The patient tolerated the procedure well and there were no complications.      Addendum:Omidria 1%/0.3% in BSS    []Yes    [x]No      Electronically signed by Alice Acosta MD on 2/6/2020 at 2:19 PM

## 2020-09-29 NOTE — ANESTHESIA PRE PROCEDURE
8/20/20   Betzaida Griffin MD   clopidogrel (PLAVIX) 75 MG tablet take 1 tablet by mouth once daily 4/56/91   Samia Zayas MD   fluticasone (FLONASE) 50 MCG/ACT nasal spray 2 sprays to each nostril once daily. 2/22/17   Molly Leyden, APRN - CNP   aspirin 81 MG tablet Take 81 mg by mouth daily. Historical Provider, MD       Current medications:    No current facility-administered medications for this encounter. Allergies:     Allergies   Allergen Reactions    Sulfamethoxazole-Trimethoprim Swelling     lips    Penicillins Rash       Problem List:    Patient Active Problem List   Diagnosis Code    Hypothyroidism E03.9    Hyperlipidemia E78.5    Cerebral infarction ([address] I63.9    Memory loss due to medical condition R41.3    TIA (transient ischemic attack) G45.9    Meralgia paresthetica G57.10    Cataract H26.9    Myopia of both eyes with astigmatism and presbyopia H52.13, H52.203, H52.4    RLS (restless legs syndrome) G25.81    Myopia of both eyes H52.13    Lacunar infarction (HCC) I63.81    CVA (cerebral vascular accident) ([address] I63.9    VBI (vertebrobasilar insufficiency) G45.0    JOHNATHAN [facility] to [facility] J30.1    Mixed hyperlipidemia E78.2    Cerebrovascular accident (CVA) due to thrombosis of precerebral artery (HCC) I63.00    Cerebral infarction due to thrombosis of vertebral artery (HCC) I63.019    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    Other hyperlipidemia E78.49       Past Medical History:        Diagnosis Date    Acute confusion     CVA (cerebral infarction)     Hyperlipidemia     Hypothyroidism     Lacunar infarction ([address]     Memory loss due to medical condition     Meralgia paresthetica     left side    Myopia with astigmatism and presbyopia     Perennial allergic rhinitis     RLS (restless legs syndrome)     TIA (transient ischemic attack) 2008, 1/06/2014    with brief confusion    VBI (vertebrobasilar insufficiency)        Past 02/05/2019       POC Tests: No results for input(s): POCGLU, POCNA, POCK, POCCL, POCBUN, POCHEMO, POCHCT in the last 72 hours. Coags:   Lab Results   Component Value Date    PROTIME 10.9 09/17/2014    INR 1.0 09/17/2014    APTT 24.5 09/17/2014       HCG (If Applicable): No results found for: PREGTESTUR, PREGSERUM, HCG, HCGQUANT     ABGs: No results found for: PHART, PO2ART, GUW3VTO, CAF4AHD, BEART, B9NDHBDL     Type & Screen (If Applicable):  No results found for: LABABO, LABRH    Drug/Infectious Status (If Applicable):  No results found for: HIV, HEPCAB    COVID-19 Screening (If Applicable):   Lab Results   Component Value Date    COVID19 Not Detected 09/24/2020         Anesthesia Evaluation  Patient summary reviewed and Nursing notes reviewed no history of anesthetic complications:   Airway: Mallampati: II  TM distance: >3 FB   Neck ROM: full  Mouth opening: > = 3 FB Dental:          Pulmonary:Negative Pulmonary ROS breath sounds clear to auscultation                             Cardiovascular:  Exercise tolerance: good (>4 METS),   (+) hyperlipidemia        Rhythm: regular  Rate: normal           Beta Blocker:  Dose within 24 Hrs         Neuro/Psych:   (+) CVA:, TIA,             GI/Hepatic/Renal: Neg GI/Hepatic/Renal ROS            Endo/Other:    (+) hypothyroidism::., .                 Abdominal:           Vascular:   + PVD, aortic or cerebral, . Anesthesia Plan      MAC     ASA 2       Induction: intravenous. Anesthetic plan and risks discussed with patient.       Plan discussed with surgical team.                  NORMAN Montalvo - CRNA   9/29/2020

## 2020-09-29 NOTE — H&P (VIEW-ONLY)
Hiro [place]    Physician Assistant    Specialty:  Physician Assistant    Progress Notes       Signed    Encounter Date:  9/21/2020          Related encounter: Office Visit from 9/21/2020 in [address]  [facility]          Signed              Show:Clear all  [x]Manual[x]Template[]Copied    Added by:  [x]ARCENIO Alfaro    []Jennifer for details  Subjective:      Patient ID: Ricarda Altamirano is a 65 y.o. female.     Cough   This is a new problem. The current episode started 1 to 4 weeks ago. The problem has been waxing and waning. The cough is productive of sputum (green sputum). Associated symptoms include ear congestion, a fever (Tmax 99.9), myalgias, rhinorrhea and wheezing. Pertinent negatives include no ear pain, headaches, sore throat or shortness of breath. She has tried a beta-agonist inhaler and OTC cough suppressant for the symptoms. The treatment provided moderate relief. Her past medical history is significant for bronchitis.      Patient has pre-op COVID testing today.     Review of Systems   Constitutional: Positive for fever (Tmax 99.9). HENT: Positive for rhinorrhea. Negative for ear pain and sore throat. Respiratory: Positive for cough and wheezing. Negative for shortness of breath. Musculoskeletal: Positive for myalgias. Neurological: Negative for headaches.         Objective:   Physical Exam  HENT:      Head: Normocephalic. Right Ear: Tympanic membrane is erythematous. Left Ear: Tympanic membrane normal.      Nose: Rhinorrhea present. Mouth/Throat:      Mouth: Mucous membranes are moist.   Eyes:      Pupils: Pupils are equal, round, and reactive to light. Neck:      Musculoskeletal: Neck supple. Cardiovascular:      Rate and Rhythm: Normal rate. Pulmonary:      Effort: Pulmonary effort is normal.      Comments: Coarse breath sounds which clear post-tussively  Neurological:      General: No focal deficit present. Mental Status: She is alert. Psychiatric:         Mood and Affect: Mood normal.            Recent Travel Screening and Travel History documentation:             Travel Screening      Question   Response     In the last month, have you been in contact with someone who was confirmed or suspected to have Coronavirus / COVID-19?   No / Unsure     Do you have any of the following symptoms?   Cough     Have you traveled internationally in the last month?   No                 Travel History   Travel since 08/21/20      No documented travel since 08/21/20             Assessment:   1. Bronchitis    2. Non-recurrent acute suppurative otitis media of right ear without spontaneous rupture of tympanic membrane                     Plan:   Patient has pre-operative COVID testing scheduled. ZPak. Fluids/Rest.  Tylenol. Nasal saline. Tessalon PRN cough. Supportive care advised. Follow-up PRN/as planned with PCP.                   ARCENIO HANDY              I have reviewed the history and physical. I examined the patient and find no relevant changes. I have reviewed with the patient the risks, benefits and alternatives to the planned procedures.      Electronically signed by Sonia Clark MD on 9/29/2020 at 12:37 PM

## 2020-09-30 ENCOUNTER — OFFICE VISIT (OUTPATIENT)
Dept: OPHTHALMOLOGY | Age: 70
End: 2020-09-30
Payer: MEDICARE

## 2020-09-30 PROCEDURE — 99211 OFF/OP EST MAY X REQ PHY/QHP: CPT

## 2020-09-30 PROCEDURE — 99024 POSTOP FOLLOW-UP VISIT: CPT | Performed by: OPHTHALMOLOGY

## 2020-09-30 ASSESSMENT — CONF VISUAL FIELD
OD_NORMAL: 1
METHOD: COUNTING FINGERS
OS_NORMAL: 1

## 2020-09-30 ASSESSMENT — REFRACTION_MANIFEST
OD_CYLINDER: -0.75
OD_AXIS: 043
METHOD_AUTOREFRACTION: 1
OD_SPHERE: +0.25

## 2020-09-30 ASSESSMENT — SLIT LAMP EXAM - LIDS
COMMENTS: NORMAL
COMMENTS: NORMAL

## 2020-09-30 ASSESSMENT — VISUAL ACUITY
METHOD: SNELLEN - LINEAR
OD_SC: 20/20

## 2020-09-30 ASSESSMENT — KERATOMETRY
OD_AXISANGLE2_DEGREES: 034
OD_K2POWER_DIOPTERS: 44.75
OD_K1POWER_DIOPTERS: 44.25
OD_AXISANGLE_DEGREES: 124
METHOD_AUTO_MANUAL: AUTOMATED

## 2020-09-30 ASSESSMENT — ENCOUNTER SYMPTOMS
ALLERGIC/IMMUNOLOGIC NEGATIVE: 0
RESPIRATORY NEGATIVE: 0
EYES NEGATIVE: 0
GASTROINTESTINAL NEGATIVE: 0

## 2020-09-30 ASSESSMENT — TONOMETRY
IOP_METHOD: TONOPEN
OD_IOP_MMHG: 21

## 2020-09-30 NOTE — PROGRESS NOTES
Rene Redd is a 65 y.o. female here for a complete eye exam.      Chief Complaint   Patient presents with    Post-Op Check       HPI     1 day post cataract surgery OD, doing well.            Review of Systems  ROS     Negative for: Constitutional, Gastrointestinal, Neurological, Skin, Genitourinary, Musculoskeletal, HENT, Endocrine, Cardiovascular, Eyes, Respiratory, Psychiatric, Allergic/Imm, Heme/Lymph          Main Ophthalmology Exam     External Exam       Right Left    External Normal Normal          Slit Lamp Exam       Right Left    Lids/Lashes Normal Normal    Conjunctiva/Sclera White and quiet White and quiet    Cornea 2+ MCE main wound, Low tear lake, trace PEE, sub-epi haze superior Low tear lake, trace PEE, sub-epi haze superior    Anterior Chamber Deep and quiet Deep and quiet    Iris Round and reactive Round and reactive    Lens Posterior chamber intraocular lens 2+ Nuclear sclerosis;, 2+ central posterior subcapsular cataract    Vitreous Normal Normal          Fundus Exam       Right Left    Disc Normal Normal    C/D Ratio 0.35 0.35    Macula Normal Normal    Vessels Normal Normal                   Tonometry     Tonometry (Tonopen, 9:18 AM)       Right Left    Pressure 21           Tonometry #2 (Tonopen, 9:27 AM)       Right Left    Pressure 20               Visual Acuity     Visual Acuity (Snellen - Linear)       Right Left    Dist sc 20/20               Pupils     Pupils       Pupils APD    Right PERRL None    Left PERRL None               Confrontational Visual Fields     Visual Fields (Counting fingers)       Right Left     Full Full               Extraocular Movement     Extraocular Movement       Right Left     Full, Ortho Full, Ortho              Keratometry     Keratometry (Automated)       K1 Axis K2 Axis    Right 44.25 034 44.75 124    Left                    Past Medical History:   Diagnosis Date    Acute confusion     CVA (cerebral infarction)     Hyperlipidemia     Hypothyroidism  Lacunar infarction ([facility] [address]     Memory loss due to medical condition     Meralgia paresthetica     left side    Myopia with astigmatism and presbyopia     Perennial allergic rhinitis     RLS (restless legs syndrome)     TIA (transient ischemic attack) 2008, 1/06/2014    with brief confusion    VBI (vertebrobasilar insufficiency)           Current Outpatient Medications:     moxifloxacin (VIGAMOX) 0.5 % ophthalmic solution, Apply 1 drop to surgery eye 4 times daily for 7 days then discontinue, Disp: 1 Bottle, Rfl: 1    prednisoLONE acetate (PRED FORTE) 1 % ophthalmic suspension, Apply 1 drop to surgery eye 4 times daily for 7 days. Then 1 drop 3 times daily for 7 days. Then 1 drop 2 times daily for 7 days. Then 1 drop daily for 7 days. , Disp: 1 Bottle, Rfl: 1    calcium carbonate (OSCAL) 500 MG TABS tablet, Take 500 mg by mouth daily, Disp: , Rfl:     rOPINIRole (REQUIP) 1 MG tablet, take 1 tablet by mouth at bedtime, Disp: 90 tablet, Rfl: 1    clopidogrel (PLAVIX) 75 MG tablet, take 1 tablet by mouth once daily, Disp: 90 tablet, Rfl: 1    levothyroxine (SYNTHROID) 125 MCG tablet, take 1 tablet by mouth once daily, Disp: 90 tablet, Rfl: 1    metoprolol tartrate (LOPRESSOR) 25 MG tablet, Take 1 tablet by mouth 2 times daily (Patient taking differently: Take 12.5 mg by mouth 2 times daily ), Disp: 180 tablet, Rfl: 3    albuterol sulfate HFA (PROVENTIL HFA) 108 (90 Base) MCG/ACT inhaler, Inhale 2 puffs into the lungs every 6 hours as needed for Wheezing or Shortness of Breath, Disp: 1 Inhaler, Rfl: 0    atorvastatin (LIPITOR) 10 MG tablet, take 1 tablet by mouth once daily, Disp: 30 tablet, Rfl: 12    melatonin 5 MG TABS tablet, Take 5 mg by mouth nightly, Disp: , Rfl:     fluticasone (FLONASE) 50 MCG/ACT nasal spray, 2 sprays to each nostril once daily. , Disp: 1 Bottle, Rfl: 11    aspirin 81 MG tablet, Take 81 mg by mouth daily. , Disp: , Rfl:     Vitamin D (CHOLECALCIFEROL) 1000 UNITS CAPS capsule, Take 3,000 Units by mouth daily , Disp: , Rfl:     Multiple Vitamin (MULTI-VITAMINS PO), Take 1 tablet by mouth Daily. , Disp: , Rfl:      Allergies   Allergen Reactions    Sulfamethoxazole-Trimethoprim Swelling     lips    Penicillins Rash        IMPRESSION:  1. Pseudophakia    2. Posterior subcapsular polar age-related cataract of left eye        PLAN:    1. Patient status post CE IOL OD 9/29/2020. Patient doing well. Postoperative drops and reasons to call or return reviewed. Proper follow-up arranged. RD precautions reviewed. 2. Visually significant OS with marked anisometropia after CE/IOL OD. RBA and informed consent for cataract surgery left eye. Expressed desire to proceed. Return 1 wk post op OD, then CE. IOL OS next month.     Electronically signed by Dillon Diamond MD on 9/30/2020 at 9:43 AM

## 2020-10-06 ENCOUNTER — OFFICE VISIT (OUTPATIENT)
Dept: OPTOMETRY | Age: 70
End: 2020-10-06
Payer: MEDICARE

## 2020-10-06 PROCEDURE — 99211 OFF/OP EST MAY X REQ PHY/QHP: CPT

## 2020-10-06 PROCEDURE — 99024 POSTOP FOLLOW-UP VISIT: CPT | Performed by: OPTOMETRIST

## 2020-10-06 ASSESSMENT — TONOMETRY
IOP_METHOD: NON-CONTACT AIR PUFF
OD_IOP_MMHG: 23
OS_IOP_MMHG: 14

## 2020-10-06 ASSESSMENT — REFRACTION_MANIFEST
OD_AXIS: 005
OD_SPHERE: -0.25
OD_CYLINDER: -0.50

## 2020-10-06 ASSESSMENT — VISUAL ACUITY
OS_CC: 20/30
METHOD: SNELLEN - LINEAR
OD_CC: 20/50 OU
CORRECTION_TYPE: CONTACTS
OD_SC: 20/40
OD_PH_CC: 20/30 OU

## 2020-10-06 ASSESSMENT — SLIT LAMP EXAM - LIDS: COMMENTS: NORMAL

## 2020-10-06 NOTE — PATIENT INSTRUCTIONS
Taper as instructed  : use the prednisolone drops in the operated eye 3x per day for one week, 2x per day for one week, 1x per day for one week, then discontinue.     Call with any questions or concerns     Discontinue the Moxiflaxacin

## 2020-10-06 NOTE — PROGRESS NOTES
Minnie Grantzunilda presents today for   Chief Complaint   Patient presents with    Post-Op Check   . HPI     Patient is here for 1 week post-op check for cataract sx of the Right eye done 9/29/20 by Dr. Shaw Rodriguez. Patient reports using the Prednisone drops three times a day to the right eye and is done with the Moxi drops at this time. Taper as instructed  : use the prednisolone drops in the operated eye 3x per day for one week, 2x per day for one week, 1x per day for one week, then discontinue. Call with any questions or concerns   Eye is comfortable and the vision is good          Current Outpatient Medications   Medication Sig Dispense Refill    moxifloxacin (VIGAMOX) 0.5 % ophthalmic solution Apply 1 drop to surgery eye 4 times daily for 7 days then discontinue 1 Bottle 1    prednisoLONE acetate (PRED FORTE) 1 % ophthalmic suspension Apply 1 drop to surgery eye 4 times daily for 7 days. Then 1 drop 3 times daily for 7 days. Then 1 drop 2 times daily for 7 days. Then 1 drop daily for 7 days. 1 Bottle 1    calcium carbonate (OSCAL) 500 MG TABS tablet Take 500 mg by mouth daily      rOPINIRole (REQUIP) 1 MG tablet take 1 tablet by mouth at bedtime 90 tablet 1    clopidogrel (PLAVIX) 75 MG tablet take 1 tablet by mouth once daily 90 tablet 1    levothyroxine (SYNTHROID) 125 MCG tablet take 1 tablet by mouth once daily 90 tablet 1    metoprolol tartrate (LOPRESSOR) 25 MG tablet Take 1 tablet by mouth 2 times daily (Patient taking differently: Take 12.5 mg by mouth 2 times daily ) 180 tablet 3    albuterol sulfate HFA (PROVENTIL HFA) 108 (90 Base) MCG/ACT inhaler Inhale 2 puffs into the lungs every 6 hours as needed for Wheezing or Shortness of Breath 1 Inhaler 0    atorvastatin (LIPITOR) 10 MG tablet take 1 tablet by mouth once daily 30 tablet 12    melatonin 5 MG TABS tablet Take 5 mg by mouth nightly      fluticasone (FLONASE) 50 MCG/ACT nasal spray 2 sprays to each nostril once daily.  1 Bottle 11    aspirin 81 MG tablet Take 81 mg by mouth daily.  Vitamin D (CHOLECALCIFEROL) 1000 UNITS CAPS capsule Take 3,000 Units by mouth daily       Multiple Vitamin (MULTI-VITAMINS PO) Take 1 tablet by mouth Daily. No current facility-administered medications for this visit.           Family History   Problem Relation Age of Onset   Fabienne Pina Stroke Mother     Other Mother          from pneumonia    Liver Disease Father         due to alcohol    Heart Disease Father         valve disease    Diabetes Father     Thyroid Disease Sister     Cancer Sister         breast cancer older sister   Fabienne Pina Diabetes Brother     High Cholesterol Sister     Diabetes Brother     Other Paternal Aunt         late onset alzheimers    Glaucoma Neg Hx     Cataracts Neg Hx      Social History     Socioeconomic History    Marital status:      Spouse name: Not on file    Number of children: Not on file    Years of education: Not on file    Highest education level: Not on file   Occupational History    Occupation: retired    Social Needs    Financial resource strain: Not on file    Food insecurity     Worry: Not on file     Inability: Not on file   [employer] needs     Medical: Not on file     Non-medical: Not on file   Tobacco Use    Smoking status: Never Smoker    Smokeless tobacco: Never Used   Substance and Sexual Activity    Alcohol use: No    Drug use: No    Sexual activity: Not Currently     Partners: Male   Lifestyle    Physical activity     Days per week: Not on file     Minutes per session: Not on file    Stress: Not on file   Relationships    Social connections     Talks on phone: Not on file     Gets together: Not on file     Attends [employer] service: Not on file     Active member of club or organization: Not on file     Attends meetings of clubs or organizations: Not on file     Relationship status: Not on file    Intimate partner violence     Fear of current or ex partner: Not on file Emotionally abused: Not on file     Physically abused: Not on file     Forced sexual activity: Not on file   Other Topics Concern    Not on file   Social History Narrative    Not on file     Past Medical History:   Diagnosis Date    Acute confusion     CVA (cerebral infarction)     Hyperlipidemia     Hypothyroidism     Lacunar infarction ([facility] [address]     Memory loss due to medical condition     Meralgia paresthetica     left side    Myopia with astigmatism and presbyopia     Perennial allergic rhinitis     RLS (restless legs syndrome)     TIA (transient ischemic attack) , 2014    with brief confusion    VBI (vertebrobasilar insufficiency)            Main Ophthalmology Exam     External Exam       Right Left    External Normal Normal          Slit Lamp Exam       Right Left    Lids/Lashes Normal     Conjunctiva/Sclera White and quiet     Cornea healing well;  no edema      Anterior Chamber Deep and quiet     Iris Round and reactive     Lens Posterior chamber intraocular lens     Vitreous Normal                    Tonometry     Tonometry (Non-contact air puff, 3:27 PM)       Right Left    Pressure 23 14   IOP.7             13.5  CH:  8.0          10.4  WS: 7.2          6.7                  Not recorded         Not recorded          Visual Acuity (Snellen - Linear)       Right Left    Dist sc 20/40     Dist cc  20/30    Dist ph cc 20/30 OU     Near cc 20/50 OU     Correction:  Contacts          Pupils     Pupils       Pupils    Right PERRL    Left PERRL              Neuro/Psych     Neuro/Psych     Oriented x3:  Yes    Mood/Affect:  Normal               Not recorded             Not recorded        Manifest Refraction     Manifest Refraction       Sphere Cylinder Axis Dist VA    Right -0.25 -0.50 005 20/20-    Left              Manifest Refraction #2 (Auto)       Sphere Cylinder Axis Dist VA    Right -0.25 -0.50 008     Left -3.75 -1.00 094                      1. Postoperative care for cataract of right eye           Patient Instructions   Taper as instructed  : use the prednisolone drops in the operated eye 3x per day for one week, 2x per day for one week, 1x per day for one week, then discontinue. Call with any questions or concerns     Discontinue the Moxiflaxacin      Return for 1 week after the 2nd eye; .

## 2020-10-12 RX ORDER — LEVOTHYROXINE SODIUM 0.12 MG/1
TABLET ORAL
Qty: 90 TABLET | Refills: 1 | Status: SHIPPED | OUTPATIENT
Start: 2020-10-12 | End: 2021-05-13 | Stop reason: SDUPTHER

## 2020-10-12 NOTE — TELEPHONE ENCOUNTER
Sarah Glez called requesting a refill of the below medication which has been pended for you:     Requested Prescriptions     Pending Prescriptions Disp Refills    levothyroxine (SYNTHROID) 125 MCG tablet 90 tablet 1       Last Appointment Date: 9/8/2020  Next Appointment Date: 2/3/2021    Allergies   Allergen Reactions    Sulfamethoxazole-Trimethoprim Swelling     lips    Penicillins Rash

## 2020-10-13 ENCOUNTER — TELEPHONE (OUTPATIENT)
Dept: PREADMISSION TESTING | Age: 70
End: 2020-10-13

## 2020-10-20 ENCOUNTER — OFFICE VISIT (OUTPATIENT)
Dept: FAMILY MEDICINE CLINIC | Age: 70
End: 2020-10-20
Payer: MEDICARE

## 2020-10-20 VITALS
DIASTOLIC BLOOD PRESSURE: 60 MMHG | WEIGHT: 185 LBS | HEART RATE: 76 BPM | SYSTOLIC BLOOD PRESSURE: 116 MMHG | BODY MASS INDEX: 34.93 KG/M2 | HEIGHT: 61 IN

## 2020-10-20 PROCEDURE — 1123F ACP DISCUSS/DSCN MKR DOCD: CPT | Performed by: FAMILY MEDICINE

## 2020-10-20 PROCEDURE — 1090F PRES/ABSN URINE INCON ASSESS: CPT | Performed by: FAMILY MEDICINE

## 2020-10-20 PROCEDURE — 3017F COLORECTAL CA SCREEN DOC REV: CPT | Performed by: FAMILY MEDICINE

## 2020-10-20 PROCEDURE — 99211 OFF/OP EST MAY X REQ PHY/QHP: CPT

## 2020-10-20 PROCEDURE — G8417 CALC BMI ABV UP PARAM F/U: HCPCS | Performed by: FAMILY MEDICINE

## 2020-10-20 PROCEDURE — 4040F PNEUMOC VAC/ADMIN/RCVD: CPT | Performed by: FAMILY MEDICINE

## 2020-10-20 PROCEDURE — 1036F TOBACCO NON-USER: CPT | Performed by: FAMILY MEDICINE

## 2020-10-20 PROCEDURE — G8484 FLU IMMUNIZE NO ADMIN: HCPCS | Performed by: FAMILY MEDICINE

## 2020-10-20 PROCEDURE — G8427 DOCREV CUR MEDS BY ELIG CLIN: HCPCS | Performed by: FAMILY MEDICINE

## 2020-10-20 PROCEDURE — 99215 OFFICE O/P EST HI 40 MIN: CPT | Performed by: FAMILY MEDICINE

## 2020-10-20 PROCEDURE — G8399 PT W/DXA RESULTS DOCUMENT: HCPCS | Performed by: FAMILY MEDICINE

## 2020-10-20 NOTE — PROGRESS NOTES
Spouse name: Not on file    Number of children: Not on file    Years of education: Not on file    Highest education level: Not on file   Occupational History    Occupation: retired    Social Needs    Financial resource strain: Not on file    Food insecurity     Worry: Not on file     Inability: Not on file   [employer] needs     Medical: Not on file     Non-medical: Not on file   Tobacco Use    Smoking status: Never Smoker    Smokeless tobacco: Never Used   Substance and Sexual Activity    Alcohol use: No    Drug use: No    Sexual activity: Not Currently     Partners: Male   Lifestyle    Physical activity     Days per week: Not on file     Minutes per session: Not on file    Stress: Not on file   Relationships    Social connections     Talks on phone: Not on file     Gets together: Not on file     Attends Congregation service: Not on file     Active member of club or organization: Not on file     Attends meetings of clubs or organizations: Not on file     Relationship status: Not on file    Intimate partner violence     Fear of current or ex partner: Not on file     Emotionally abused: Not on file     Physically abused: Not on file     Forced sexual activity: Not on file   Other Topics Concern    Not on file   Social History Narrative    Not on file       Current Outpatient Medications   Medication Sig Dispense Refill    levothyroxine (SYNTHROID) 125 MCG tablet take 1 tablet by mouth once daily 90 tablet 1    calcium carbonate (OSCAL) 500 MG TABS tablet Take 500 mg by mouth daily      rOPINIRole (REQUIP) 1 MG tablet take 1 tablet by mouth at bedtime 90 tablet 1    clopidogrel (PLAVIX) 75 MG tablet take 1 tablet by mouth once daily 90 tablet 1    metoprolol tartrate (LOPRESSOR) 25 MG tablet Take 1 tablet by mouth 2 times daily (Patient taking differently: Take 12.5 mg by mouth 2 times daily ) 180 tablet 3    albuterol sulfate HFA (PROVENTIL HFA) 108 (90 Base) MCG/ACT inhaler Inhale 2 puffs into the lungs every 6 hours as needed for Wheezing or Shortness of Breath 1 Inhaler 0    atorvastatin (LIPITOR) 10 MG tablet take 1 tablet by mouth once daily 30 tablet 12    melatonin 5 MG TABS tablet Take 5 mg by mouth nightly      aspirin 81 MG tablet Take 81 mg by mouth daily.  Vitamin D (CHOLECALCIFEROL) 1000 UNITS CAPS capsule Take 3,000 Units by mouth daily       moxifloxacin (VIGAMOX) 0.5 % ophthalmic solution Apply 1 drop to surgery eye 4 times daily for 7 days then discontinue 1 Bottle 1    prednisoLONE acetate (PRED FORTE) 1 % ophthalmic suspension Apply 1 drop to surgery eye 4 times daily for 7 days. Then 1 drop 3 times daily for 7 days. Then 1 drop 2 times daily for 7 days. Then 1 drop daily for 7 days. 1 Bottle 1    fluticasone (FLONASE) 50 MCG/ACT nasal spray 2 sprays to each nostril once daily. (Patient not taking: Reported on 10/20/2020) 1 Bottle 11    Multiple Vitamin (MULTI-VITAMINS PO) Take 1 tablet by mouth Daily. No current facility-administered medications for this visit. Allergies   Allergen Reactions    Sulfamethoxazole-Trimethoprim Swelling     lips    Penicillins Rash       REVIEW OF SYSTEMS:  General: No fevers, chills, change in weight  HEENT: No double vision, blurry vision left eye, runny nose, sore throat, tinnitus  Cardio: No chest pain, palpitations, ROSALES, edema, PND  Pulmonary: No cough, hemoptysis, SOB  GI: No nausea, vomiting, dysphagia, odynophagia, diarrhea, constipation. : No dysuria, hematuria, urgency, incontinence  Musculoskeletal:Pain mark small joints hands and some in knee. has h/o RLS , no joint swelling  Neuro: No dizziness/lightheadedness, no seizures  Endocrine: No polyuria, polydipsia, polyphagia, no temperature intolerance  Skin: No lesions or itching  No problems with ADLs  Sleep: good  Psychiatric: No depression    PHYSICAL EXAM:  VS:  /60 (Site: Right Upper Arm, Position: Sitting, Cuff Size: Medium Adult) Pulse 76   Ht 5' 1\" (1.549 m)   Wt 185 lb (83.9 kg)   LMP 01/01/1998   BMI 34.96 kg/m²   General:  Alert and oriented, NAD  HEENT:  TMs, CHEN, EOMI, Conjunctivae clear,left eye lens looks cloudy       Throat currently clear. NECK:  Supple without adenopathy or thyromegaly, no carotid bruits  LUNGS:  CTA all fields  HEART:  RRR without M, R, or G  ABDOMEN:  Soft and nontender without palpable abnormalities  EXTREMITIES:Minor arthritic changes in fingers both hands ,no clubbing, cyanosis, or edema, no calf tenderness  NEURO:  No focal deficits. SKIN:  warm to touch,normal texture. No active lesions. ASSESSMENT/PLAN:     Diagnosis Orders   1. Preop examination     2. Need for vaccination     3. Senile cataract of left eye, unspecified age-related cataract type         No orders of the defined types were placed in this encounter. Requested Prescriptions      No prescriptions requested or ordered in this encounter   Is medically stable for catract surgery. Hold Plavix and ASA 5 days before procedure. Tylenol prn for arthritic pain. F/U with PCP for ongoing care    No follow-ups on file.     Electronically signed by Xin Jacob MD

## 2020-10-22 ENCOUNTER — HOSPITAL ENCOUNTER (OUTPATIENT)
Dept: PREADMISSION TESTING | Age: 70
Setting detail: SPECIMEN
Discharge: HOME | End: 2020-10-26
Payer: MEDICARE

## 2020-10-22 PROCEDURE — U0003 INFECTIOUS AGENT DETECTION BY NUCLEIC ACID (DNA OR RNA); SEVERE ACUTE RESPIRATORY SYNDROME CORONAVIRUS 2 (SARS-COV-2) (CORONAVIRUS DISEASE [COVID-19]), AMPLIFIED PROBE TECHNIQUE, MAKING USE OF HIGH THROUGHPUT TECHNOLOGIES AS DESCRIBED BY CMS-2020-01-R: HCPCS

## 2020-10-23 LAB — SARS-COV-2, NAA: NOT DETECTED

## 2020-10-27 ENCOUNTER — ANESTHESIA EVENT (OUTPATIENT)
Dept: OPERATING ROOM | Age: 70
End: 2020-10-27
Payer: MEDICARE

## 2020-10-27 ENCOUNTER — HOSPITAL ENCOUNTER (OUTPATIENT)
Age: 70
Setting detail: OUTPATIENT SURGERY
Discharge: HOME | End: 2020-10-27
Attending: OPHTHALMOLOGY | Admitting: OPHTHALMOLOGY
Payer: MEDICARE

## 2020-10-27 ENCOUNTER — ANESTHESIA (OUTPATIENT)
Dept: OPERATING ROOM | Age: 70
End: 2020-10-27
Payer: MEDICARE

## 2020-10-27 VITALS
HEIGHT: 61 IN | HEART RATE: 76 BPM | DIASTOLIC BLOOD PRESSURE: 60 MMHG | TEMPERATURE: 97.1 F | SYSTOLIC BLOOD PRESSURE: 124 MMHG | BODY MASS INDEX: 34.93 KG/M2 | WEIGHT: 185 LBS | OXYGEN SATURATION: 95 % | RESPIRATION RATE: 16 BRPM

## 2020-10-27 VITALS
SYSTOLIC BLOOD PRESSURE: 125 MMHG | DIASTOLIC BLOOD PRESSURE: 60 MMHG | OXYGEN SATURATION: 100 % | RESPIRATION RATE: 17 BRPM | TEMPERATURE: 97 F

## 2020-10-27 PROCEDURE — 2580000003 HC RX 258: Performed by: NURSE ANESTHETIST, CERTIFIED REGISTERED

## 2020-10-27 PROCEDURE — 3600000012 HC SURGERY LEVEL 2 ADDTL 15MIN: Performed by: OPHTHALMOLOGY

## 2020-10-27 PROCEDURE — V2632 POST CHMBR INTRAOCULAR LENS: HCPCS | Performed by: OPHTHALMOLOGY

## 2020-10-27 PROCEDURE — 2500000003 HC RX 250 WO HCPCS: Performed by: OPHTHALMOLOGY

## 2020-10-27 PROCEDURE — 2709999900 HC NON-CHARGEABLE SUPPLY: Performed by: OPHTHALMOLOGY

## 2020-10-27 PROCEDURE — 6360000002 HC RX W HCPCS: Performed by: NURSE ANESTHETIST, CERTIFIED REGISTERED

## 2020-10-27 PROCEDURE — 7100000010 HC PHASE II RECOVERY - FIRST 15 MIN: Performed by: OPHTHALMOLOGY

## 2020-10-27 PROCEDURE — 6370000000 HC RX 637 (ALT 250 FOR IP): Performed by: OPHTHALMOLOGY

## 2020-10-27 PROCEDURE — 3700000000 HC ANESTHESIA ATTENDED CARE: Performed by: OPHTHALMOLOGY

## 2020-10-27 PROCEDURE — 6360000002 HC RX W HCPCS: Performed by: OPHTHALMOLOGY

## 2020-10-27 PROCEDURE — 3700000001 HC ADD 15 MINUTES (ANESTHESIA): Performed by: OPHTHALMOLOGY

## 2020-10-27 PROCEDURE — 3600000002 HC SURGERY LEVEL 2 BASE: Performed by: OPHTHALMOLOGY

## 2020-10-27 DEVICE — IMPLANTABLE DEVICE: Type: IMPLANTABLE DEVICE | Site: EYE | Status: FUNCTIONAL

## 2020-10-27 RX ORDER — PHENYLEPHRINE HCL 2.5 %
1 DROPS OPHTHALMIC (EYE) SEE ADMIN INSTRUCTIONS
Status: DISCONTINUED | OUTPATIENT
Start: 2020-10-27 | End: 2020-10-27 | Stop reason: HOSPADM

## 2020-10-27 RX ORDER — MIDAZOLAM HYDROCHLORIDE 1 MG/ML
INJECTION INTRAMUSCULAR; INTRAVENOUS PRN
Status: DISCONTINUED | OUTPATIENT
Start: 2020-10-27 | End: 2020-10-27 | Stop reason: SDUPTHER

## 2020-10-27 RX ORDER — TROPICAMIDE 10 MG/ML
1 SOLUTION/ DROPS OPHTHALMIC SEE ADMIN INSTRUCTIONS
Status: DISCONTINUED | OUTPATIENT
Start: 2020-10-27 | End: 2020-10-27 | Stop reason: HOSPADM

## 2020-10-27 RX ORDER — EPINEPHRINE 1 MG/ML
INJECTION, SOLUTION, CONCENTRATE INTRAVENOUS PRN
Status: DISCONTINUED | OUTPATIENT
Start: 2020-10-27 | End: 2020-10-27 | Stop reason: ALTCHOICE

## 2020-10-27 RX ORDER — SODIUM CHLORIDE 0.9 % (FLUSH) 0.9 %
SYRINGE (ML) INJECTION PRN
Status: DISCONTINUED | OUTPATIENT
Start: 2020-10-27 | End: 2020-10-27 | Stop reason: SDUPTHER

## 2020-10-27 RX ORDER — TETRACAINE HYDROCHLORIDE 5 MG/ML
SOLUTION OPHTHALMIC PRN
Status: DISCONTINUED | OUTPATIENT
Start: 2020-10-27 | End: 2020-10-27 | Stop reason: ALTCHOICE

## 2020-10-27 RX ORDER — DEXAMETHASONE PHOSPHATE - MOXIFLOXACIN - KETOROLAC TROMETHAMINE 1; .5; .4 MG/ML; MG/ML; MG/ML
INJECTION, SOLUTION OPHTHALMIC PRN
Status: DISCONTINUED | OUTPATIENT
Start: 2020-10-27 | End: 2020-10-27 | Stop reason: ALTCHOICE

## 2020-10-27 RX ORDER — TETRACAINE HYDROCHLORIDE 5 MG/ML
1 SOLUTION OPHTHALMIC SEE ADMIN INSTRUCTIONS
Status: DISCONTINUED | OUTPATIENT
Start: 2020-10-27 | End: 2020-10-27 | Stop reason: HOSPADM

## 2020-10-27 RX ORDER — LIDOCAINE HYDROCHLORIDE 20 MG/ML
JELLY TOPICAL PRN
Status: DISCONTINUED | OUTPATIENT
Start: 2020-10-27 | End: 2020-10-27 | Stop reason: ALTCHOICE

## 2020-10-27 RX ADMIN — TETRACAINE HYDROCHLORIDE 1 DROP: 5 SOLUTION OPHTHALMIC at 12:32

## 2020-10-27 RX ADMIN — TETRACAINE HYDROCHLORIDE 1 DROP: 5 SOLUTION OPHTHALMIC at 12:37

## 2020-10-27 RX ADMIN — TROPICAMIDE 1 DROP: 10 SOLUTION/ DROPS OPHTHALMIC at 12:24

## 2020-10-27 RX ADMIN — TROPICAMIDE 1 DROP: 10 SOLUTION/ DROPS OPHTHALMIC at 12:32

## 2020-10-27 RX ADMIN — Medication 10 ML: at 13:17

## 2020-10-27 RX ADMIN — PHENYLEPHRINE HYDROCHLORIDE 1 DROP: 25 SOLUTION/ DROPS OPHTHALMIC at 12:37

## 2020-10-27 RX ADMIN — TETRACAINE HYDROCHLORIDE 1 DROP: 5 SOLUTION OPHTHALMIC at 12:24

## 2020-10-27 RX ADMIN — TROPICAMIDE 1 DROP: 10 SOLUTION/ DROPS OPHTHALMIC at 12:37

## 2020-10-27 RX ADMIN — PHENYLEPHRINE HYDROCHLORIDE 1 DROP: 25 SOLUTION/ DROPS OPHTHALMIC at 12:24

## 2020-10-27 RX ADMIN — MIDAZOLAM HYDROCHLORIDE 2 MG: 1 INJECTION, SOLUTION INTRAMUSCULAR; INTRAVENOUS at 13:19

## 2020-10-27 RX ADMIN — PHENYLEPHRINE HYDROCHLORIDE 1 DROP: 25 SOLUTION/ DROPS OPHTHALMIC at 12:32

## 2020-10-27 ASSESSMENT — PAIN - FUNCTIONAL ASSESSMENT: PAIN_FUNCTIONAL_ASSESSMENT: 0-10

## 2020-10-27 ASSESSMENT — PAIN SCALES - GENERAL
PAINLEVEL_OUTOF10: 1
PAINLEVEL_OUTOF10: 0

## 2020-10-27 ASSESSMENT — PAIN DESCRIPTION - PAIN TYPE: TYPE: SURGICAL PAIN

## 2020-10-27 ASSESSMENT — PAIN DESCRIPTION - ONSET: ONSET: ON-GOING

## 2020-10-27 ASSESSMENT — PAIN DESCRIPTION - LOCATION: LOCATION: EYE

## 2020-10-27 ASSESSMENT — PAIN DESCRIPTION - FREQUENCY: FREQUENCY: CONTINUOUS

## 2020-10-27 ASSESSMENT — PAIN DESCRIPTION - DESCRIPTORS: DESCRIPTORS: CONSTANT

## 2020-10-27 NOTE — ANESTHESIA PRE PROCEDURE
Department of Anesthesiology  Preprocedure Note       Name:  Ty Kee   Age:  65 y.o.  :  1950                                          MRN:  [id number]         Date:  10/27/2020      Surgeon: Hardy Watkins):  Clay Grace MD    Procedure: Procedure(s):  Left Cataract Extraction w/ IOL Implant    Medications prior to admission:   Prior to Admission medications    Medication Sig Start Date End Date Taking? Authorizing Provider   levothyroxine (SYNTHROID) 125 MCG tablet take 1 tablet by mouth once daily 10/12/20   ARCENIO Arora   moxifloxacin (VIGAMOX) 0.5 % ophthalmic solution Apply 1 drop to surgery eye 4 times daily for 7 days then discontinue 20   Clay Grace MD   prednisoLONE acetate (PRED FORTE) 1 % ophthalmic suspension Apply 1 drop to surgery eye 4 times daily for 7 days. Then 1 drop 3 times daily for 7 days. Then 1 drop 2 times daily for 7 days. Then 1 drop daily for 7 days. 20   Clay Grace MD   calcium carbonate (OSCAL) 500 MG TABS tablet Take 500 mg by mouth daily    Historical Provider, MD   rOPINIRole (REQUIP) 1 MG tablet take 1 tablet by mouth at bedtime    Verónica Maciel MD   clopidogrel (PLAVIX) 75 MG tablet take 1 tablet by mouth once daily 78   Verónica Maciel MD   metoprolol tartrate (LOPRESSOR) 25 MG tablet Take 1 tablet by mouth 2 times daily  Patient taking differently: Take 12.5 mg by mouth 2 times daily  20   Krystal Red DO   albuterol sulfate HFA (PROVENTIL HFA) 108 (90 Base) MCG/ACT inhaler Inhale 2 puffs into the lungs every 6 hours as needed for Wheezing or Shortness of Breath 19   NORMAN Jensen - CNP   atorvastatin (LIPITOR) 10 MG tablet take 1 tablet by mouth once daily 10/31/19   José Antonio Caro MD   melatonin 5 MG TABS tablet Take 5 mg by mouth nightly    Historical Provider, MD   fluticasone (FLONASE) 50 MCG/ACT nasal spray 2 sprays to each nostril once daily.  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    Other hyperlipidemia E78.49       Past Medical History:        Diagnosis Date    Acute confusion     CVA (cerebral infarction)     Hyperlipidemia     Hypothyroidism     Lacunar infarction ([facility] [address]     Memory loss due to medical condition     Meralgia paresthetica     left side    Myopia with astigmatism and presbyopia     Perennial allergic rhinitis     RLS (restless legs syndrome)     TIA (transient ischemic attack) 2008, 1/06/2014    with brief confusion    VBI (vertebrobasilar insufficiency)        Past Surgical History:        Procedure Laterality Date    CARDIAC CATHETERIZATION  9/2014    [facility]   nl cath    COLONOSCOPY  02/25/2015    mild diverticulosis:  Kari BERNARD    INSERTABLE CARDIAC MONITOR  4/22/16    removed    INTRACAPSULAR CATARACT EXTRACTION Right 9/29/2020    Right Cataract Extraction w/ IOL Implant performed by Gisel Mayo MD at [address] [facility] / [address]      hemangioma removal on right foot 2nd toe    WISDOM TOOTH EXTRACTION  1981       Social History:    Social History     Tobacco Use    Smoking status: Never Smoker    Smokeless tobacco: Never Used   Substance Use Topics    Alcohol use: No                                Counseling given: Not Answered      Vital Signs (Current): There were no vitals filed for this visit.                                            BP Readings from Last 3 Encounters:   10/27/20 117/76   10/20/20 116/60   09/29/20 (!) 111/58       NPO Status:                                                                                 BMI:   Wt Readings from Last 3 Encounters:   10/27/20 185 lb (83.9 kg)   10/20/20 185 lb (83.9 kg)   09/29/20 184 lb (83.5 kg)     There is no height or weight on file to calculate BMI.    CBC:   Lab Results   Component Value Date    WBC 8.2 02/11/2020    RBC 5.15 02/11/2020    HGB 14.1 02/11/2020    HCT 44.3 02/11/2020    MCV 86.0 02/11/2020    RDW 12.5 02/11/2020     02/11/2020       CMP:   Lab Results   Component Value Date     02/05/2019    K 4.1 02/05/2019     02/05/2019    CO2 28 02/05/2019    BUN 16 02/05/2019    CREATININE 0.70 02/05/2019    GFRAA >60 02/05/2019    LABGLOM >60 02/05/2019    GLUCOSE 106 02/05/2019    PROT 7.5 02/05/2019    CALCIUM 9.6 02/05/2019    BILITOT 1.10 02/05/2019    ALKPHOS 101 02/05/2019    AST 22 02/05/2019    ALT 24 02/05/2019       POC Tests: No results for input(s): POCGLU, POCNA, POCK, POCCL, POCBUN, POCHEMO, POCHCT in the last 72 hours. Coags:   Lab Results   Component Value Date    PROTIME 10.9 09/17/2014    INR 1.0 09/17/2014    APTT 24.5 09/17/2014       HCG (If Applicable): No results found for: PREGTESTUR, PREGSERUM, HCG, HCGQUANT     ABGs: No results found for: PHART, PO2ART, NVF5JXW, BZK2ZZO, BEART, X5KYPRNO     Type & Screen (If Applicable):  No results found for: LABABO, LABRH    Drug/Infectious Status (If Applicable):  No results found for: HIV, HEPCAB    COVID-19 Screening (If Applicable):   Lab Results   Component Value Date    COVID19 Not Detected 10/22/2020         Anesthesia Evaluation  Patient summary reviewed and Nursing notes reviewed no history of anesthetic complications:   Airway: Mallampati: II  TM distance: >3 FB   Neck ROM: full  Mouth opening: > = 3 FB Dental:          Pulmonary:Negative Pulmonary ROS breath sounds clear to auscultation                             Cardiovascular:  Exercise tolerance: good (>4 METS),   (+) hyperlipidemia        Rhythm: regular  Rate: normal           Beta Blocker:  Dose within 24 Hrs         Neuro/Psych:   (+) CVA:, TIA,             GI/Hepatic/Renal: Neg GI/Hepatic/Renal ROS            Endo/Other:    (+) hypothyroidism::., .                 Abdominal:           Vascular:   + PVD, aortic or cerebral, .                                        Anesthesia Plan      MAC     ASA 2

## 2020-10-27 NOTE — INTERVAL H&P NOTE
I have reviewed the history and physical. I examined the patient and find no relevant changes. I have reviewed with the patient the risks, benefits and alternatives to the planned procedures.      Electronically signed by Lieutenant Adriane MD on 10/27/2020 at 12:27 PM
I have reviewed the history and physical. I examined the patient and find no relevant changes. I have reviewed with the patient the risks, benefits and alternatives to the planned procedures.      Electronically signed by Martell Calle MD on 10/27/2020 at 1:08 PM
50

## 2020-10-27 NOTE — OP NOTE
Surgical Operative Report  Date of procedure: 10/27/20 1:37 PM  Preoperative Diagnosis: Visually significant cataract  OS  Postoperative Diagosis:  same  Procedure: Cataract extraction with intraocular lens OS  Anesthesia:  topical proparacaine, lidocaine jelly 2%, intracameral Lidocaine 1%/Phenylephrine 1.5%  Estimated blood loss: Less than 5 cc  Indications for procedure: The patient reports significant difficulties with activities of daily living secondary to cataract formation. The patient understands the risks, benefits, and alternatives of cataract surgery as stated in the informed consent. The patient wishes to proceed with cataract surgery without reservation. Operative Summary:  Informed consent was reviewed and the operative site was confirmed by the patient and marked by the physician in the preop area. Topical proparacaine was instilled onto the operative eye and the patient was prepped and draped in the usual sterile fashion in the OR. A lid speculum was placed in the eye and topical lidocaine jelly 2% was placed on the cornea. LRI  @ with an AK blade          [x] No LRI performed     A paracentesis site was created temporally, and the anterior chamber was filled with Lidocaine 1%/Phenylephrine 1.5%. Viscoat was then placed in the anterior chamber and a temporal clear cornea incision was created. A cystotome was used to begin a capsulorrhexis that was completed with Utrata forceps. Hydrodissection was performed and the lens was then phacoemulsified with the phacohandpiece. The cortical material was then removed with the I/A handpiece and the capsule was filled with cohesive viscoelastic. A +13.0 MX60E lens was inserted into the capsular bag and rotated into position. The cohesive viscoelastic was removed with the I/A handpiece, and the chamber was deepened with BSS. A 0.1 ml bolus of Moxifloxacin 5mg/ml was instilled into the anterior chamber.   The wounds were hydrated, checked for water tightness. 10-0 Biosorb suture in paracentesis site/keratome wound        [x]No suture     The wounds were appropriately sealed. The speculum was removed and the patient was taken to the recovery area. The patient tolerated the procedure well and there were no complications.      Addendum:[place] 1%/0.3% in BSS    []Yes    [x]No      Electronically signed by Lieutenant Adriane MD on 2/6/2020 at 2:19 PM

## 2020-10-28 ENCOUNTER — OFFICE VISIT (OUTPATIENT)
Dept: OPHTHALMOLOGY | Age: 70
End: 2020-10-28
Payer: MEDICARE

## 2020-10-28 PROCEDURE — 99211 OFF/OP EST MAY X REQ PHY/QHP: CPT

## 2020-10-28 PROCEDURE — 99024 POSTOP FOLLOW-UP VISIT: CPT | Performed by: OPHTHALMOLOGY

## 2020-10-28 ASSESSMENT — KERATOMETRY
OS_K2POWER_DIOPTERS: 44.25
OD_AXISANGLE_DEGREES: 117
OS_K1POWER_DIOPTERS: 43.75
OD_AXISANGLE2_DEGREES: 027
OD_K1POWER_DIOPTERS: 44.75
METHOD_AUTO_MANUAL: AUTOMATED
OS_AXISANGLE2_DEGREES: 057
OD_K2POWER_DIOPTERS: 45.00
OS_AXISANGLE_DEGREES: 147

## 2020-10-28 ASSESSMENT — SLIT LAMP EXAM - LIDS
COMMENTS: NORMAL
COMMENTS: NORMAL

## 2020-10-28 ASSESSMENT — REFRACTION_MANIFEST
OS_CYLINDER: -0.50
OD_AXIS: 039
METHOD_AUTOREFRACTION: 1
OS_AXIS: 082
OD_CYLINDER: -0.50
OS_SPHERE: +0.50
OD_SPHERE: +0.00

## 2020-10-28 ASSESSMENT — VISUAL ACUITY
OD_SC: 20/20
OS_SC: 20/20
METHOD: SNELLEN - LINEAR

## 2020-10-28 ASSESSMENT — TONOMETRY
IOP_METHOD: TONOPEN
OD_IOP_MMHG: 15
OS_IOP_MMHG: 17

## 2020-10-28 ASSESSMENT — ENCOUNTER SYMPTOMS
RESPIRATORY NEGATIVE: 0
EYES NEGATIVE: 0
GASTROINTESTINAL NEGATIVE: 0
ALLERGIC/IMMUNOLOGIC NEGATIVE: 0

## 2020-10-28 NOTE — PROGRESS NOTES
Ricarda Altamirano is a 65 y.o. female here for a complete eye exam.      Chief Complaint   Patient presents with    Post-Op Check       HPI     1 day post cataract surgery left eye, doing great  Right eye: 9/29/20  Left eye: 10/27/20          Review of Systems  ROS     Negative for: Constitutional, Gastrointestinal, Neurological, Skin, Genitourinary, Musculoskeletal, HENT, Endocrine, Cardiovascular, Eyes, Respiratory, Psychiatric, Allergic/Imm, Heme/Lymph          Main Ophthalmology Exam     External Exam       Right Left    External Normal Normal          Slit Lamp Exam       Right Left    Lids/Lashes Normal Normal    Conjunctiva/Sclera White and quiet White and quiet    Cornea  Low tear lake, trace PEE, sub-epi haze superior 1+ MCE main wound, Low tear lake, trace PEE, sub-epi haze superior    Anterior Chamber Deep and quiet tr cell, no flare    Iris Round and reactive Round and reactive    Lens Posterior chamber intraocular lens Posterior chamber intraocular lens    Vitreous Normal Normal          Fundus Exam       Right Left    Disc Normal Normal    C/D Ratio 0.35 0.35    Macula Normal Normal    Vessels Normal Normal                   Tonometry     Tonometry (Tonopen, 8:18 AM)       Right Left    Pressure 15 17              Visual Acuity     Visual Acuity (Snellen - Linear)       Right Left    Dist sc 20/20 20/20               Not recorded          Not recorded          Not recorded        Keratometry     Keratometry (Automated)       K1 Axis K2 Axis    Right 44.75 027 45.00 117    Left 43.75 057 44.25 147                Past Medical History:   Diagnosis Date    Acute confusion     CVA (cerebral infarction)     Hyperlipidemia     Hypothyroidism     Lacunar infarction ([facility] [address]     Memory loss due to medical condition     Meralgia paresthetica     left side    Myopia with astigmatism and presbyopia     Perennial allergic rhinitis     RLS (restless legs syndrome)     TIA (transient ischemic attack) 2008, 1/06/2014    with brief confusion    VBI (vertebrobasilar insufficiency)           Current Outpatient Medications:     levothyroxine (SYNTHROID) 125 MCG tablet, take 1 tablet by mouth once daily, Disp: 90 tablet, Rfl: 1    moxifloxacin (VIGAMOX) 0.5 % ophthalmic solution, Apply 1 drop to surgery eye 4 times daily for 7 days then discontinue, Disp: 1 Bottle, Rfl: 1    prednisoLONE acetate (PRED FORTE) 1 % ophthalmic suspension, Apply 1 drop to surgery eye 4 times daily for 7 days. Then 1 drop 3 times daily for 7 days. Then 1 drop 2 times daily for 7 days. Then 1 drop daily for 7 days. , Disp: 1 Bottle, Rfl: 1    calcium carbonate (OSCAL) 500 MG TABS tablet, Take 500 mg by mouth daily, Disp: , Rfl:     rOPINIRole (REQUIP) 1 MG tablet, take 1 tablet by mouth at bedtime, Disp: 90 tablet, Rfl: 1    clopidogrel (PLAVIX) 75 MG tablet, take 1 tablet by mouth once daily, Disp: 90 tablet, Rfl: 1    metoprolol tartrate (LOPRESSOR) 25 MG tablet, Take 1 tablet by mouth 2 times daily (Patient taking differently: Take 12.5 mg by mouth 2 times daily ), Disp: 180 tablet, Rfl: 3    albuterol sulfate HFA (PROVENTIL HFA) 108 (90 Base) MCG/ACT inhaler, Inhale 2 puffs into the lungs every 6 hours as needed for Wheezing or Shortness of Breath, Disp: 1 Inhaler, Rfl: 0    atorvastatin (LIPITOR) 10 MG tablet, take 1 tablet by mouth once daily, Disp: 30 tablet, Rfl: 12    melatonin 5 MG TABS tablet, Take 5 mg by mouth nightly, Disp: , Rfl:     fluticasone (FLONASE) 50 MCG/ACT nasal spray, 2 sprays to each nostril once daily. , Disp: 1 Bottle, Rfl: 11    aspirin 81 MG tablet, Take 81 mg by mouth daily. , Disp: , Rfl:     Vitamin D (CHOLECALCIFEROL) 1000 UNITS CAPS capsule, Take 3,000 Units by mouth daily , Disp: , Rfl:     Multiple Vitamin (MULTI-VITAMINS PO), Take 1 tablet by mouth Daily. , Disp: , Rfl:      Allergies   Allergen Reactions    Sulfamethoxazole-Trimethoprim Swelling     lips    Penicillins Rash IMPRESSION:  1. Pseudophakia    2. Keratoconjunctivitis sicca of both eyes not specified as Sjogren's        PLAN:    1. Patient status post CE IOL OU (OS 10/27/2020). Patient doing well. Postoperative drops and reasons to call or return reviewed. Proper follow-up arranged. 2. ATs 3-4x/d OU    Return 1 wk post CE/IOL OU with Dr. Rock Aguiar.     Electronically signed by Lieutenant Adriane MD on 10/28/2020 at 8:50 AM    (Please note that portions of this note were completed with a voice recognition program. Efforts were made to edit the dictations but occasionally words are mis-transcribed.)

## 2020-11-03 ENCOUNTER — OFFICE VISIT (OUTPATIENT)
Dept: OPTOMETRY | Age: 70
End: 2020-11-03
Payer: MEDICARE

## 2020-11-03 PROCEDURE — 99024 POSTOP FOLLOW-UP VISIT: CPT | Performed by: OPTOMETRIST

## 2020-11-03 PROCEDURE — 99211 OFF/OP EST MAY X REQ PHY/QHP: CPT

## 2020-11-03 ASSESSMENT — REFRACTION_MANIFEST
OD_AXIS: 031
OS_AXIS: 002
OD_SPHERE: PLANO
OS_SPHERE: -0.50
OD_CYLINDER: -0.50
OS_CYLINDER: -0.25

## 2020-11-03 ASSESSMENT — VISUAL ACUITY
OD_SC+: -2
OS_SC: 20/30
METHOD: SNELLEN - LINEAR
OD_SC: 20/25
OS_SC+: -2

## 2020-11-03 ASSESSMENT — TONOMETRY
OS_IOP_MMHG: 25
IOP_METHOD: NON-CONTACT AIR PUFF
OD_IOP_MMHG: 18

## 2020-11-03 ASSESSMENT — REFRACTION_WEARINGRX
OD_CYLINDER: -0.50
OD_AXIS: 005
OD_SPHERE: -0.25
SPECS_TYPE: NOT FINAL

## 2020-11-03 ASSESSMENT — KERATOMETRY
OS_AXISANGLE2_DEGREES: 006
METHOD_AUTO_MANUAL: AUTOMATED
OD_K2POWER_DIOPTERS: 45.00
OS_K1POWER_DIOPTERS: 44.25
OS_AXISANGLE_DEGREES: 096
OD_AXISANGLE2_DEGREES: 016
OD_K1POWER_DIOPTERS: 44.50
OD_AXISANGLE_DEGREES: 106
OS_K2POWER_DIOPTERS: 45.00

## 2020-11-03 ASSESSMENT — SLIT LAMP EXAM - LIDS
COMMENTS: NORMAL
COMMENTS: NORMAL

## 2020-11-03 NOTE — PROGRESS NOTES
Sandy Gil presents today for   Chief Complaint   Patient presents with    Blurred Vision    Post-Op Check   . HPI     Blurred Vision     In both eyes. Vision is blurred. Context:  reading. Comments     1 week post op cataract surgery  OD: 9/29/2020  OS: 10/27/2020  Dr. Peggy Velazquez performed surgery  Drops finished on right eye  Prednisolone 3 x a day in left eye for 1 week; then 2 x daily for 1 week; then 1 x daily for 1 week. Current Outpatient Medications   Medication Sig Dispense Refill    levothyroxine (SYNTHROID) 125 MCG tablet take 1 tablet by mouth once daily 90 tablet 1    prednisoLONE acetate (PRED FORTE) 1 % ophthalmic suspension Apply 1 drop to surgery eye 4 times daily for 7 days. Then 1 drop 3 times daily for 7 days. Then 1 drop 2 times daily for 7 days. Then 1 drop daily for 7 days. 1 Bottle 1    calcium carbonate (OSCAL) 500 MG TABS tablet Take 500 mg by mouth daily      rOPINIRole (REQUIP) 1 MG tablet take 1 tablet by mouth at bedtime 90 tablet 1    clopidogrel (PLAVIX) 75 MG tablet take 1 tablet by mouth once daily 90 tablet 1    metoprolol tartrate (LOPRESSOR) 25 MG tablet Take 1 tablet by mouth 2 times daily (Patient taking differently: Take 12.5 mg by mouth 2 times daily ) 180 tablet 3    albuterol sulfate HFA (PROVENTIL HFA) 108 (90 Base) MCG/ACT inhaler Inhale 2 puffs into the lungs every 6 hours as needed for Wheezing or Shortness of Breath 1 Inhaler 0    atorvastatin (LIPITOR) 10 MG tablet take 1 tablet by mouth once daily 30 tablet 12    melatonin 5 MG TABS tablet Take 5 mg by mouth nightly      fluticasone (FLONASE) 50 MCG/ACT nasal spray 2 sprays to each nostril once daily. 1 Bottle 11    aspirin 81 MG tablet Take 81 mg by mouth daily.  Vitamin D (CHOLECALCIFEROL) 1000 UNITS CAPS capsule Take 3,000 Units by mouth daily       Multiple Vitamin (MULTI-VITAMINS PO) Take 1 tablet by mouth Daily.       moxifloxacin (VIGAMOX) 0.5 % ophthalmic solution Apply 1 drop to surgery eye 4 times daily for 7 days then discontinue (Patient not taking: Reported on 11/3/2020) 1 Bottle 1     No current facility-administered medications for this visit.           Family History   Problem Relation Age of Onset   [facility] Stroke Mother     Other Mother          from pneumonia    Liver Disease Father         due to alcohol    Heart Disease Father         valve disease    Diabetes Father     Thyroid Disease Sister     Cancer Sister         breast cancer older sister    Diabetes Brother     High Cholesterol Sister     Diabetes Brother     Other Paternal Aunt         late onset alzheimers    Glaucoma Neg Hx     Cataracts Neg Hx      Social History     Socioeconomic History    Marital status:      Spouse name: None    Number of children: None    Years of education: None    Highest education level: None   Occupational History    Occupation: retired    Social Needs    Financial resource strain: None    Food insecurity     Worry: None     Inability: None    Transportation needs     Medical: None     Non-medical: None   Tobacco Use    Smoking status: Never Smoker    Smokeless tobacco: Never Used   Substance and Sexual Activity    Alcohol use: No    Drug use: No    Sexual activity: Not Currently     Partners: Male   Lifestyle    Physical activity     Days per week: None     Minutes per session: None    Stress: None   Relationships    Social connections     Talks on phone: None     Gets together: None     Attends [employer] service: None     Active member of club or organization: None     Attends meetings of clubs or organizations: None     Relationship status: None    Intimate partner violence     Fear of current or ex partner: None     Emotionally abused: None     Physically abused: None     Forced sexual activity: None   Other Topics Concern    None   Social History Narrative    None     Past Medical History:   Diagnosis Date    Acute confusion     CVA (cerebral infarction)     Hyperlipidemia     Hypothyroidism     Lacunar infarction ([address]     Memory loss due to medical condition     Meralgia paresthetica     left side    Myopia with astigmatism and presbyopia     Perennial allergic rhinitis     RLS (restless legs syndrome)     TIA (transient ischemic attack) 2008, 1/06/2014    with brief confusion    VBI (vertebrobasilar insufficiency)            Main Ophthalmology Exam     External Exam       Right Left    External Normal Normal          Slit Lamp Exam       Right Left    Lids/Lashes Normal Normal    Conjunctiva/Sclera White and quiet White and quiet    Cornea Clear; healing well  Clear; healing well     Anterior Chamber Deep and quiet Deep and quiet    Iris Round and reactive Round and reactive    Lens Posterior chamber intraocular lens Posterior chamber intraocular lens    Vitreous Normal Normal                   Tonometry     Tonometry (Non-contact air puff, 9:39 AM)       Right Left    Pressure 18 25   IOPg:  15.5             22.0  CH:  8.5          7.3  WS: 7.4          5.5                  Not recorded         Not recorded          Visual Acuity (Snellen - Linear)       Right Left    Dist sc 20/25 -2 20/30 -2          Pupils     Pupils       Pupils    Right PERRL    Left PERRL              Neuro/Psych     Neuro/Psych     Oriented x3:  Yes    Mood/Affect:  Normal              Keratometry     Keratometry (Automated)       K1 Axis K2 Axis    Right 44.50 016 45.00 106    Left 44.25 006 45.00 096                  Ophthalmology Exam     Wearing Rx       Sphere Cylinder Axis    Right -0.25 -0.50 005    Left       Type:  NOT FINAL              Manifest Refraction     Manifest Refraction       Sphere Cylinder [place] Dist [place]    Right [place] -0.50 031 20/20    Left -0.50 -0.25 002 20/20          Manifest Refraction #2 (Auto)       Sphere Cylinder Axis Dist [place]    Right +0.00 -0.50 031     Left -0.50 -0.25 001                Final Rx Sphere Cylinder Axis    Right [place] -0.50 031    Left -0.50 -0.25 002    Type:  do not fill             1. Postoperative care for cataract of right eye    2. Postoperative care for cataract of left eye           Patient Instructions   Begin using in the left eye:Prednisolone 3 x a day in left eye for 1 week; then 2 x daily for 1 week; then 1 x daily for 1 week. Call with any questions or concerns      Return in about 3 weeks (around 11/24/2020) for final post op .

## 2020-11-03 NOTE — PATIENT INSTRUCTIONS
Begin using in the left eye:Prednisolone 3 x a day in left eye for 1 week; then 2 x daily for 1 week; then 1 x daily for 1 week.     Call with any questions or concerns

## 2020-11-04 RX ORDER — ATORVASTATIN CALCIUM 10 MG/1
TABLET, FILM COATED ORAL
Qty: 30 TABLET | Refills: 12 | Status: SHIPPED | OUTPATIENT
Start: 2020-11-04 | End: 2021-11-29

## 2020-11-06 ENCOUNTER — OFFICE VISIT (OUTPATIENT)
Dept: NEUROLOGY | Age: 70
End: 2020-11-06
Payer: MEDICARE

## 2020-11-06 VITALS
SYSTOLIC BLOOD PRESSURE: 116 MMHG | BODY MASS INDEX: 35.23 KG/M2 | TEMPERATURE: 98.4 F | HEART RATE: 89 BPM | HEIGHT: 61 IN | OXYGEN SATURATION: 95 % | WEIGHT: 186.6 LBS | DIASTOLIC BLOOD PRESSURE: 70 MMHG

## 2020-11-06 PROCEDURE — 3017F COLORECTAL CA SCREEN DOC REV: CPT | Performed by: PSYCHIATRY & NEUROLOGY

## 2020-11-06 PROCEDURE — 1090F PRES/ABSN URINE INCON ASSESS: CPT | Performed by: PSYCHIATRY & NEUROLOGY

## 2020-11-06 PROCEDURE — 99214 OFFICE O/P EST MOD 30 MIN: CPT

## 2020-11-06 PROCEDURE — 1123F ACP DISCUSS/DSCN MKR DOCD: CPT | Performed by: PSYCHIATRY & NEUROLOGY

## 2020-11-06 PROCEDURE — 99215 OFFICE O/P EST HI 40 MIN: CPT | Performed by: PSYCHIATRY & NEUROLOGY

## 2020-11-06 PROCEDURE — G8484 FLU IMMUNIZE NO ADMIN: HCPCS | Performed by: PSYCHIATRY & NEUROLOGY

## 2020-11-06 PROCEDURE — G8399 PT W/DXA RESULTS DOCUMENT: HCPCS | Performed by: PSYCHIATRY & NEUROLOGY

## 2020-11-06 PROCEDURE — G8417 CALC BMI ABV UP PARAM F/U: HCPCS | Performed by: PSYCHIATRY & NEUROLOGY

## 2020-11-06 PROCEDURE — 1036F TOBACCO NON-USER: CPT | Performed by: PSYCHIATRY & NEUROLOGY

## 2020-11-06 PROCEDURE — G8427 DOCREV CUR MEDS BY ELIG CLIN: HCPCS | Performed by: PSYCHIATRY & NEUROLOGY

## 2020-11-06 PROCEDURE — 4040F PNEUMOC VAC/ADMIN/RCVD: CPT | Performed by: PSYCHIATRY & NEUROLOGY

## 2020-11-06 RX ORDER — CLOPIDOGREL BISULFATE 75 MG/1
TABLET ORAL
Qty: 90 TABLET | Refills: 1 | Status: SHIPPED | OUTPATIENT
Start: 2020-11-06 | End: 2021-06-11 | Stop reason: SDUPTHER

## 2020-11-06 RX ORDER — ROPINIROLE 1 MG/1
TABLET, FILM COATED ORAL
Qty: 90 TABLET | Refills: 1 | Status: SHIPPED | OUTPATIENT
Start: 2020-11-06 | End: 2021-05-19 | Stop reason: SDUPTHER

## 2020-11-06 ASSESSMENT — ENCOUNTER SYMPTOMS
SWOLLEN GLANDS: 0
APNEA: 0
VOMITING: 0
NAUSEA: 0
CHOKING: 0
ABDOMINAL PAIN: 0
CHEST TIGHTNESS: 0
EYE PAIN: 0
DIARRHEA: 0
ABDOMINAL DISTENTION: 0
CHANGE IN BOWEL HABIT: 0
EYE REDNESS: 0
EYE ITCHING: 0
EYE DISCHARGE: 0
BACK PAIN: 0
SINUS PRESSURE: 0
SORE THROAT: 0
PHOTOPHOBIA: 0
COUGH: 0
VISUAL CHANGE: 0
BLOOD IN STOOL: 0
COLOR CHANGE: 0
WHEEZING: 0
SHORTNESS OF BREATH: 0
FACIAL SWELLING: 0
BOWEL INCONTINENCE: 0
VOICE CHANGE: 0
TROUBLE SWALLOWING: 0
CONSTIPATION: 0

## 2020-11-06 NOTE — PROGRESS NOTES
Subjective:      Patient ID: Bessie Ritter is a 65 y.o. RIGHT   HANDED female. Cerebrovascular Accident   This is a chronic problem. Episode onset: TIA  IN  2008   AND  CVA  IN  2014. The problem occurs rarely. The problem has been resolved. Pertinent negatives include no abdominal pain, anorexia, arthralgias, change in bowel habit, chest pain, chills, congestion, coughing, diaphoresis, fatigue, fever, headaches, joint swelling, myalgias, nausea, neck pain, numbness, rash, sore throat, swollen glands, urinary symptoms, vertigo, visual change, vomiting or weakness. Nothing aggravates the symptoms. Treatments tried: ASA,  PLAVIX. The treatment provided significant relief. Dizziness   This is a chronic problem. Episode onset: MORE  THAN   2 YEARS. The problem occurs intermittently. The problem has been resolved. Pertinent negatives include no abdominal pain, anorexia, arthralgias, change in bowel habit, chest pain, chills, congestion, coughing, diaphoresis, fatigue, fever, headaches, joint swelling, myalgias, nausea, neck pain, numbness, rash, sore throat, swollen glands, urinary symptoms, vertigo, visual change, vomiting or weakness. Nothing aggravates the symptoms. She has tried nothing for the symptoms. The treatment provided significant relief. Neurologic Problem   The patient's pertinent negatives include no altered mental status, clumsiness, focal sensory loss, focal weakness, loss of balance, memory loss, near-syncope, slurred speech, syncope, visual change or weakness. Primary symptoms comment: MILD SHORT  TERM  MEMORY  PROBLEMS  . This is a chronic problem. The current episode started more than 1 year ago. The neurological problem developed insidiously. The problem is unchanged. There was no focality noted. Associated symptoms include dizziness.  Pertinent negatives include no abdominal pain, auditory change, aura, back pain, bladder incontinence, bowel incontinence, chest pain, confusion, diaphoresis, fatigue, fever, headaches, light-headedness, nausea, neck pain, palpitations, shortness of breath, vertigo or vomiting. Past treatments include nothing. The treatment provided no relief. Her past medical history is significant for a CVA. There is no history of a bleeding disorder, a clotting disorder, dementia, head trauma, liver disease, mood changes or seizures. History obtained from  The patient     and other  available medical records were  Also  reviewed. 1)     PREVIOUS     H/O      TIA    WITH  MEMORY  LOSS                      AND  CONFUSION     IN      2008                      -   RESOLVED              2)    PREVIOUS     H/O       STROKE    IN   January 2014. H/O   INFARCT  IN MEDIAL   TEMPORAL  LOBE                 PREVIOUS    H/O      CONFUSION  AND  MEMORY  LOSS                        FOR   5  HOURS  AT  THAT  TIME.                                -   RESOLVED               3)     ON    ASA   AND  PLAVIX    AND  TOLERATING  THE  SAME. NO  RECURRENCE   OF  TIA OR  CVA            4)        H/O     RESTLESS  LEG  SYNDROME                   FOR  MORE  THAN    30  YEARS               BETTER  CONTROLLED   ON  REQUIP            5)   NO   H/O  CLINICAL  SEIZURE  ACTIVITY. NO  RECURRENCE  OF  MEMORY  LOSS                     OR  CONFUSION. 6)   NO   EVIDENCE  OF  PARKINSONIAN  FEATURES          PATIENT  DENIES  ANY  GAIT  DIFFICULTIES                      OR  BALANCE  PROBLEMS          7)       MERALGIA   PARESTHETICA  ON   THE  LEFT  THIGH                       -   STABLE          8)      MULTIPLE   CO  MORBID  MEDICAL  CONDITIONS. BEING  FOLLOWED  BY  HER  PCP. 9)      MILD  SHORT TERM  MEMORY PROBLEMS                         --  FOR    2  YEARS                 PATIENT  NOT  CONCERNED.                   PATIENT  NOT  INTERESTED  IN  MEDICATION  FOR  DEMENTIA                  PATIENT  COULD BE  TRIED  WITH MEDICATION,            IF  PATIENT  IS  WILLING  FOR  THE  SAME. 10)         H/O    DIZZINESS  IN  THE  PAST                     -   NO  RECURRENCE            11)       CAROTID  DOPPLER,  TCD  IN  JAN. 2019      SHOWED                            NO  SIGNIFICANT  ABNORMALITIES                 12)      PATIENT  AWARE  OF  VARIOUS  RISK  FACTORS  FOR  TIA  /  STROKE                      REVIEWED,  DISCUSSED   WITH  PATIENT  IN  DETAIL. 13)       PATIENT  DENIES    ANY    RECURRENCE  OF  TIA /   STROKE                           OR  DIZZINESS. PATIENT  DENIES    ANY  NEW  NEUROLOGICAL   CONCERNS. The  Duration,  Quality,  Severity,  Location,  Timing,  Context,  Modifying  Factors   Of   The   Chief   Complaint       And  Present  Illness   Was   Reviewed   In   Chronological   [place].              Patient   Indicates   The  Presence   And  The  Absence  Of  The  Following  Associated  And   Additional  Neurological    Symptoms:                                Balance  And coordination problems  absent           Gait problems     absent            Headaches      absent              Migraines           absent           Memory problems        PRESENT               Confusion        absent            Paresthesia numbness          absent           Seizures  And  Starring  Episodes           absent           Syncope,  Near  syncopal episodes         absent           Speech problems           absent             Swallowing  Problems      absent            Dizziness,  Light headedness           absent                        Vertigo        absent             Generalized   Weakness    absent              focal  Weakness     absent             Tremors         absent              Sleep  Problems     absent             History  Of   Recent   Head  Injury     absent             History  Of   Recent  TIA     absent             History  Of   Recent    Stroke     absent Neck  Pain and  Neck muscle  Spasms  Absent               Radiating  down   And   Weakness           absent            Lower back   Pain  And     Spasms  Absent              Radiating    Down   And   Weakness          absent                H/O   FALLS        absent               History  Of   Visual  Symptoms    Absent                  Associated   Diplopia       absent                                                                    Also   Additional   Symptoms   Present    As  Documented    In   The detailed      Review  Of  Systems   And    Please   Refer   To    Them for   Additional  Information. Any components  That are either  Unobtainable  Or  Limited  In   HPI, ROS  And/or PFSH       Are   Due   To   Patient's  Medical  Problems,  Clinical  Condition and/or lack of other  Alternate resources.             RECORDS   REVIEWED:    historical medical records, lab reports, office notes and radiology reports         INFORMATION   REVIEWED:     MEDICAL   HISTORY,     SURGICAL   HISTORY,   MEDICATIONS   LIST,   ALLERGIES AND  DRUG  INTOLERANCES,     FAMILY   HISTORY,  SOCIAL  HISTORY,    PROBLEM  LIST   FOR  PATIENT  CARE   COORDINATION           Past Medical History:   Diagnosis Date    Acute confusion     CVA (cerebral infarction)     Hyperlipidemia     Hypothyroidism     [facility] ([facility] [address]     Memory loss due to medical condition     Meralgia paresthetica     left side    Myopia with astigmatism and presbyopia     Perennial allergic rhinitis     RLS (restless legs syndrome)     TIA (transient ischemic attack) 2008, 1/06/2014    with brief confusion    VBI (vertebrobasilar insufficiency)                   Past Surgical History:   Procedure Laterality Date    CARDIAC CATHETERIZATION  9/2014    [facility] cath    COLONOSCOPY  02/25/2015    mild diverticulosis:  Kari BERNARD    INSERTABLE CARDIAC MONITOR  4/22/16    removed    INTRACAPSULAR CATARACT EXTRACTION Right 9/29/2020    Right Cataract Extraction w/ IOL Implant performed by Eddie Ross MD at [facility] Left 10/27/2020    Left Cataract Extraction w/ IOL Implant performed by Eddie Ross MD at [address] [place] [address] PRE-MAL[place]NT / [address]      hemangioma removal on right foot 2nd toe    WISDOM TOOTH EXTRACTION  1981                 Current Outpatient Medications   Medication Sig Dispense Refill    clopidogrel (PLAVIX) 75 MG tablet take 1 tablet by mouth once daily 90 tablet 1    rOPINIRole (REQUIP) 1 MG tablet take 1 tablet by mouth at bedtime 90 tablet 1    atorvastatin (LIPITOR) 10 MG tablet take 1 tablet by mouth once daily 30 tablet 12    levothyroxine (SYNTHROID) 125 MCG tablet take 1 tablet by mouth once daily 90 tablet 1    prednisoLONE acetate (PRED FORTE) 1 % ophthalmic suspension Apply 1 drop to surgery eye 4 times daily for 7 days. Then 1 drop 3 times daily for 7 days. Then 1 drop 2 times daily for 7 days. Then 1 drop daily for 7 days. 1 Bottle 1    calcium carbonate (OSCAL) 500 MG TABS tablet Take 500 mg by mouth daily      metoprolol tartrate (LOPRESSOR) 25 MG tablet Take 1 tablet by mouth 2 times daily (Patient taking differently: Take 12.5 mg by mouth 2 times daily ) 180 tablet 3    melatonin 5 MG TABS tablet Take 5 mg by mouth nightly      aspirin 81 MG tablet Take 81 mg by mouth daily.  Vitamin D (CHOLECALCIFEROL) 1000 UNITS CAPS capsule Take 3,000 Units by mouth daily       Multiple Vitamin (MULTI-VITAMINS PO) Take 1 tablet by mouth Daily.       moxifloxacin (VIGAMOX) 0.5 % ophthalmic solution Apply 1 drop to surgery eye 4 times daily for 7 days then discontinue (Patient not taking: Reported on 11/6/2020) 1 Bottle 1    albuterol sulfate HFA (PROVENTIL HFA) 108 (90 Base) MCG/ACT inhaler Inhale 2 puffs into the lungs every 6 hours as needed for Wheezing or Shortness of Breath (Patient not taking: Reported on Intimate partner violence     Fear of current or ex partner: Not on file     Emotionally abused: Not on file     Physically abused: Not on file     Forced sexual activity: Not on file   Other Topics Concern    Not on file   Social History Narrative    Not on file       Vitals:    11/06/20 1407   BP: 116/70   Pulse: 89   Temp: 98.4 °F (36.9 °C)   SpO2: 95%         Wt Readings from Last 3 Encounters:   11/06/20 186 lb 9.6 oz (84.6 kg)   10/27/20 185 lb (83.9 kg)   10/20/20 185 lb (83.9 kg)         BP Readings from Last 3 Encounters:   11/06/20 116/70   10/27/20 124/60   10/27/20 125/60       Hematology and Coagulation  Lab Results   Component Value Date    WBC 8.2 02/11/2020    RBC 5.15 02/11/2020    HGB 14.1 02/11/2020    HCT 44.3 02/11/2020    MCV 86.0 02/11/2020    MCH 27.4 02/11/2020    MCHC 31.8 02/11/2020    RDW 12.5 02/11/2020     02/11/2020    MPV 9.8 02/11/2020       Chemistries  Lab Results   Component Value Date     02/05/2019    K 4.1 02/05/2019     02/05/2019    CO2 28 02/05/2019    BUN 16 02/05/2019    CREATININE 0.70 02/05/2019    CALCIUM 9.6 02/05/2019    PROT 7.5 02/05/2019    LABALBU 4.4 02/05/2019    BILITOT 1.10 02/05/2019    ALKPHOS 101 02/05/2019    AST 22 02/05/2019    ALT 24 02/05/2019     Lab Results   Component Value Date    ALKPHOS 101 02/05/2019    ALT 24 02/05/2019    AST 22 02/05/2019    PROT 7.5 02/05/2019    BILITOT 1.10 02/05/2019    LABALBU 4.4 02/05/2019     Lab Results   Component Value Date    BUN 16 02/05/2019    CREATININE 0.70 02/05/2019     Lab Results   Component Value Date    CALCIUM 9.6 02/05/2019    MG 2.1 01/06/2014     Lab Results   Component Value Date    AST 22 02/05/2019    ALT 24 02/05/2019       Lab Results   Component Value Date    CKTOTAL 59 11/08/2014                 Review of Systems   Constitutional: Negative for appetite change, chills, diaphoresis, fatigue, fever and unexpected weight change.    HENT: Negative for congestion, dental problem, drooling, ear discharge, ear pain, facial swelling, hearing loss, mouth sores, nosebleeds, postnasal drip, sinus pressure, sore throat, tinnitus, trouble swallowing and voice change. Eyes: Negative for photophobia, pain, discharge, redness, itching and visual disturbance. Respiratory: Negative for apnea, cough, choking, chest tightness, shortness of breath and wheezing. Cardiovascular: Negative for chest pain, palpitations, leg swelling and near-syncope. Gastrointestinal: Negative for abdominal distention, abdominal pain, anorexia, blood in stool, bowel incontinence, change in bowel habit, constipation, diarrhea, nausea and vomiting. Endocrine: Negative for cold intolerance, heat intolerance, polydipsia, polyphagia and polyuria. Genitourinary: Negative for bladder incontinence. Musculoskeletal: Negative for arthralgias, back pain, gait problem, joint swelling, myalgias, neck pain and neck stiffness. Skin: Negative for color change, pallor, rash and wound. Allergic/Immunologic: Negative for environmental allergies, food allergies and immunocompromised state. Neurological: Positive for dizziness. Negative for vertigo, tremors, focal weakness, seizures, syncope, facial asymmetry, speech difficulty, weakness, light-headedness, numbness, headaches and loss of balance. Hematological: Negative for adenopathy. Does not bruise/bleed easily. Psychiatric/Behavioral: Positive for decreased concentration. Negative for agitation, behavioral problems, confusion, dysphoric mood, hallucinations, memory loss, self-injury, sleep disturbance and suicidal ideas. The patient is not nervous/anxious and is not hyperactive. Objective:   Physical Exam  Constitutional:       Appearance: She is well-developed. HENT:      Head: Normocephalic and atraumatic. No raccoon eyes or Abernathy's sign.       Right Ear: External ear normal.      Left Ear: External ear normal.      Nose: Nose normal.   Eyes: Conjunctiva/sclera: Conjunctivae normal.      Pupils: Pupils are equal, round, and reactive to light. Neck:      Musculoskeletal: Normal range of motion and neck supple. Normal range of motion. No neck rigidity or muscular tenderness. Thyroid: No thyroid mass or thyromegaly. Vascular: No carotid bruit. Trachea: No tracheal deviation. Meningeal: Brudzinski's sign and Kernig's sign absent. Cardiovascular:      Rate and Rhythm: Normal rate and regular rhythm. Pulmonary:      Effort: Pulmonary effort is normal.   Musculoskeletal: Normal range of motion. General: No tenderness. Skin:     General: Skin is warm. Coloration: Skin is not pale. Findings: No erythema or rash. Nails: There is no clubbing. Psychiatric:         Attention and Perception: She is attentive. Mood and Affect: Mood is not anxious or depressed. Affect is not labile, blunt or inappropriate. Speech: She is communicative. Speech is not rapid and pressured, delayed, slurred or tangential.         Behavior: Behavior is not agitated, slowed, aggressive, withdrawn, hyperactive or combative. Behavior is cooperative. Thought Content: Thought content is not paranoid or delusional. Thought content does not include homicidal or suicidal ideation. Thought content does not include homicidal or suicidal plan. Cognition and Memory: Cognition is impaired. Memory is impaired. She does not exhibit impaired recent memory or impaired remote memory. Judgment: Judgment is not impulsive or inappropriate. NEUROLOGICAL EXAMINATION :    A) MENTAL STATUS:                   Alert and  oriented  To time, place  And  Person. No Aphasia. No  Dysarthria. Able   To  Follow     SIMPLE   commands without   Any  Difficulty. No right  To left confusion. Normal  Speech  And language function. Insight and  Judgment ,Fund  Of  Knowledge   within normal limits                Recent  And  Remote memory   DECREASED                  Attention &Concentration are    DECREASED                                                 B) CRANIAL NERVES :             2 CN : Visual  Acuity  And  Visual fields  within normal limits                      Fundi  Could  Not  Be  Could  Not  Be  Evaluated. 3,4,6 CN : Both  Pupils are   Reactive and  Equal.                          Extraocular   Movements  Are  Intact. No  Nystagmus. No  TESSA. No  Afferent  Pupillary  Defect noted. 5 CN :  Normal  Facial sensations and Corneal  Reflexes         7 CN :  Normal  Facial  Symmetry  And  Strength. No facial  Weakness. 8 CN :  Hearing  Appears subnormal        9, 10 CN: Normal spontaneous, reflex palate movements       11 CN:   Normal  Shoulder shrug and  strength       12 CN :   Normal  Tongue movements and  Tongue  In midline                      No tongue   Fasciculations or atrophy         C) MOTOR  EXAM:                 Strength  In upper  And  Lower extremities   within normal limits               No  Drift. No  Atrophy               Rapid alternating  And  repetitions  Movements  within normal limits               Muscle  Tone  In upper  And  Lower  Extremities  within normal limits                No rigidity. No  Spasticity. Bradykinesia   absent               No  Asterixis. Sustention  Tremor , Resting  Tremor   absent                    No other  Abnormal  Movements noted             D) SENSORY :               light touch, pinprick, position  And  Vibration  DECREASED        E) REFLEXES:                   Deep  Tendon  Reflexes within normal limits                    No pathological  Reflexes  Bilaterally.                                   F) COORDINATION  AND  GAIT :                                Station and  Gait  within normal limits Romberg's test negative                          Ataxia negative        Assessment:           Patient Active Problem List   Diagnosis    Hypothyroidism    Hyperlipidemia    [address]bral infarction ([address]    Memory loss due to medical condition    TIA (transient ischemic attack)    Meralgia paresthetica    Cataract    Myopia of both eyes with astigmatism and presbyopia    RLS (restless legs syndrome)    Myopia of both eyes    [address]ar infarction ([address]    CVA (cerebral vascular accident) ([address]    VBI (vertebrobasilar insufficiency)    JOHNATHAN due to [address] Mixed hyperlipidemia    Cerebrovascular accident (CVA) due to thrombosis of precerebral artery ([address]    Cerebral infarction due to thrombosis of vertebral artery ([address]    Late effects of CVA (cerebrovascular accident)    Other hyperlipidemia           ULTRASOUND EVALUATION OF THE CAROTID ARTERIES       11/20/2017       COMPARISON:   4 December 2015       HISTORY:   ORDERING SYSTEM PROVIDED HISTORY: Lacunar infarction ([address]       FINDINGS:   RIGHT:       The right common carotid artery demonstrates peak systolic velocities of 85,   91 cm/sec in the proximal and distal segments respectively.       The right internal carotid artery demonstrates the systolic velocities of 73,   80, 89 cm/sec in the proximal, mid and distal segments respectively.       The external carotid artery is patent.  The vertebral artery demonstrates   normal antegrade flow.       No evidence of focal atherosclerotic plaque.       ICA/CCA ratio of 1.0.       LEFT:       The left common carotid artery demonstrates peak systolic velocities of 92,   80 80 cm/sec in the proximal and distal segments respectively.       The left internal carotid artery demonstrates the systolic velocities of 63,   94, 90 cm/sec in the proximal, mid and distal segments respectively.       The external carotid artery is patent.  The vertebral artery demonstrates flow in the vertebral arteries. Impression: No carotid artery stenosis or significant plaque disease. Stable examination compared to 2009. Final report electronically signed by Alice Ramirez M.D. on 12/4/2015        . Plan:          VISITING DIAGNOSIS:      ICD-10-CM    1. Cerebral infarction due to thrombosis of vertebral artery, unspecified blood vessel laterality (HCC)  I63.019    2. Mixed hyperlipidemia  E78.2    3. VBI (vertebrobasilar insufficiency)  G45.0    4. Meralgia paresthetica of left side  G57.12    5. Cerebrovascular accident (CVA) due to thrombosis of precerebral artery ([facility] [address]  I63.00    6. Late effects of CVA (cerebrovascular accident)  I69.90    7. TIA (transient ischemic attack)  G45.9    8. Memory loss due to medical condition  R41.3    9. Lacunar infarction ([facility] [address]  I63.81    10. Cerebral infarction due to thrombosis of precerebral artery (HCC)  I63.00    11. Acquired hypothyroidism  E03.9    12. RLS (restless legs syndrome)  G25.81 rOPINIRole (REQUIP) 1 MG tablet              CONCERNS   &   INCREASED   RISK   FOR         * TIA,  CEREBRO  VASCULAR  ISCHEMIA, STROKE       *   COGNITIVE  &   MEMORY PROBLEMS  AND  DEMENTIA                 VARIOUS  RISK   FACTORS   WERE  REVIEWED   AND   DISCUSSED. PLAN:       Caty Kearns  Of  The  Diagnoses,  The  Management & Treatment  Options           AND    Care  plan  Were        Reviewed and   Discussed   With  patient. * Goals  And  Expectations  Of  The  Therapy  Discussed   And  Reviewed. *   Benefits   And   Side  Effect  Profile  Of  Medication/s   Were   Discussed             * Need   For  Further   Follow up For  The  Various  Problems  Were discussed. * Results  Of  The  Previous  Diagnostic tests were reviewed and questions answered. patient  understand the same.              Medical  Decision  Making  Was  Made  Based on the   Complexity  Of  Above  Mentioned  Diagnoses,        Data reviewed   & diagnostic  Tests  Reviewed,  Risk  Of  Significant   Co morbidities and complicating   Factors. Medical  Decision  Was   High  Complexity  Due   To  The  Patient's  Multiple  Symptoms,  Advancing   Disease,      Complex  Treatment  Regimen,  Multiple medications and   Risk  Of   Side  Effects,  Difficulty  In  Medication  Management      And  Diagnostic  Challenges   In  View  Of  The  Associated   Co  Morbid  Conditions   And  Problems. * FALL   PRECAUTIONS. *   BE  CAREFUL  WITH  ACTIVITIES         *   ADEQUATE   FLUID  INTAKE   AND  ELECTROLYTE  BALANCE         * KEEP  DAIRY  OF   THE  NEUROLOGICAL  SYMPTOMS        RECORDING THE    DURATION  AND  FREQUENCY. *  AVOID    CONDITIONS  AND  FACTORS   THAT  MAKE   NEUROLOGICAL  SYMPTOMS  WORSE. *  TO  MAINTAIN  REGULAR  SLEEP  WAKE  CYCLES. *   TO  HAVE  ADEQUATE  REST  AND   SLEEP    HOURS.          *    TO   AVOID   TO  SLEEP  IN   SUPINE  POSITION. *      WEIGHT   LOSS. *    AVOID  ANY USAGE OF                   TOBACCO,  EXCESSIVE  ALCOHOL  AND   ILLEGAL   SUBSTANCES          *  CONTINUE MEDICATIONS PRESCRIBED BY NEUROLOGIST AS    RECOMMENDED     *   Compliance   With  Medications   And  Instructions      * CURRENTLY  TOLERATING  THE  PRESCRIBED   MEDICATIONS. WITHOUT  ANY  SIGNIFICANT  SIDE  EFFECTS   &  GETTING BENEFIT. *  MILD  SHORT TERM  MEMORY PROBLEMS    FOR    2  YEARS              PATIENT  NOT  CONCERNED. * PATIENT  AWARE  OF  VARIOUS  RISK  FACTORS  FOR  TIA  /  STROKE                    SAME  REVIEWED,  DISCUSSED   WITH  PATIENT  IN  DETAIL.                 *  May   Use  Pill  Box,    If  Needed      *    To  Continue  The    Antiplatelet  therapy    As   Recommended  Was   Discussed              *    Prophylactic  Use   Of     Vitamin   B   Complex,  Folic  Acid,    Vitamin  B12    Multivitamin       Tablet  Daily    Supplementations   Over  The More   Than   50% of face  To face Time   Was  Spent  On  Counseling   And   Coordination  Of  Care       Of   Patient's multiple   Neurological  Problems   And   Comorbid  Medical   Conditions. Electronically signed by Hugo Navarro MD     Board Certified in  Neurology &  In  [facility] ([facility])      DISCLAIMER:   Although every effort was made to ensure the accuracy of this  electronic transcription, some errors in transcription may have occurred. GENERAL PATIENT INSTRUCTIONS:     A Healthy Lifestyle: Care Instructions  Your Care Instructions  A healthy lifestyle can help you feel good, stay at a healthy weight, and have plenty of energy for both work and play. A healthy lifestyle is something you can share with your whole family. A healthy lifestyle also can lower your risk for serious health problems, such as high blood pressure, heart disease, and diabetes. You can follow a few steps listed below to improve your health and the health of your family. Follow-up care is a key part of your treatment and safety. Be sure to make and go to all appointments, and call your doctor if you are having problems. Its also a good idea to know your test results and keep a list of the medicines you take. How can you care for yourself at home? Do not eat too much sugar, fat, or fast foods. You can still have dessert and treats now and then. The goal is moderation. Start small to improve your eating habits. Pay attention to portion sizes, drink less juice and soda pop, and eat more fruits and vegetables. Eat a healthy amount of food. A 3-ounce serving of meat, for example, is about the size of a deck of cards. Fill the rest of your plate with vegetables and whole grains. Limit the amount of soda and sports drinks you have every day. Drink more water when you are thirsty. Eat at least 5 servings of fruits and vegetables every day.  It may seem like a lot, but it is not hard to reach this goal. A serving or helping is 1 piece of fruit, 1 cup of vegetables, or 2 cups of leafy, raw vegetables. Have an apple or some carrot sticks as an afternoon snack instead of a candy bar. Try to have fruits and/or vegetables at every meal.  Make exercise part of your daily routine. You may want to start with simple activities, such as walking, bicycling, or slow swimming. Try to be active 30 to 60 minutes every day. You do not need to do all 30 to 60 minutes all at once. For example, you can exercise 3 times a day for 10 or 20 minutes. Moderate exercise is safe for most people, but it is always a good idea to talk to your doctor before starting an exercise program.  Keep moving. Antwon De La Cruzrauler the lawn, work in the garden, or [employer]. Take the stairs instead of the elevator at work. If you smoke, quit. People who smoke have an increased risk for heart attack, stroke, cancer, and other lung illnesses. Quitting is hard, but there are ways to boost your chance of quitting tobacco for good. Use nicotine gum, patches, or lozenges. Ask your doctor about stop-smoking programs and medicines. Keep trying. In addition to reducing your risk of diseases in the future, you will notice some benefits soon after you stop using tobacco. If you have shortness of breath or asthma symptoms, they will likely get better within a few weeks after you quit. Limit how much alcohol you drink. Moderate amounts of alcohol (up to 2 drinks a day for men, 1 drink a day for women) are okay. But drinking too much can lead to liver problems, high blood pressure, and other health problems. Family health  If you have a family, there are many things you can do together to improve your health. Eat meals together as a family as often as possible. Eat healthy foods. This includes fruits, vegetables, lean meats and dairy, and whole grains. Include your family in your fitness plan.  Most people think of activities such as jogging or tennis as the way to fitness, but there are many ways you and your family can be more active. Anything that makes you breathe hard and gets your heart pumping is exercise. Here are some tips:  Walk to do errands or to take your child to school or the bus. Go for a family bike ride after dinner instead of watching TV. Where can you learn more? Go to https://EMBIpebridgeteweb.Doochoo. org and sign in to your [employer] account. Enter S516 in the [employer] Loyalty box to learn more about \"A Healthy Lifestyle: Care Instructions. \"     If you do not have an account, please click on the \"Sign Up Now\" link. Current as of: July 26, 2016  Content Version: 11.2  © 9577-5754 [employer]. Care instructions adapted under license by [facility] ([facility]). If you have questions about a medical condition or this instruction, always ask your healthcare professional. [address] any warranty or liability for your use of this information.

## 2020-11-06 NOTE — PATIENT INSTRUCTIONS
[facility] NEUROLOGY    Due to the complex nature of our neurological testing, It is the policy of the Neurology Department not to release the results of your testing over the phone. Once all testing is completed and we have all the results back, Dr. Preston Topete will then personally go over all the results with you and answer any questions that you may have during a follow up appointment. If you are unable to keep this appointment, please notify the [address] @ [phone], as soon as possible. Please bring your prescription bottles to all appointments. *   ADEQUATE   FLUID  INTAKE   AND  ELECTROLYTE  BALANCE           * KEEP  DAIRY  OF   THE  NEUROLOGICAL  SYMPTOMS          *  TO  MAINTAIN  REGULAR  SLEEP  WAKE  CYCLES. *   TO  HAVE  ADEQUATE  REST  AND   SLEEP    HOURS.        *    AVOID  USAGE OF   TOBACCO,  EXCESSIVE  ALCOHOL                AND   ILLEGAL   SUBSTANCES,  IF  ANY          *  Maintain   Healthy  Life Style    With   Periodic  Monitoring  Of      Any  Medical  Conditions  Including   Elevated  Blood  Pressure,  Lipid  Profile,     Blood  Sugar levels  And   Heart  Disease. *   Period   Screening  For  Cancers  Involving  Breast,  Colon,   lungs  And  Other  Organs  As  Applicable,  In consultation   With  Your  Primary Care Providers. *  If   There is  Any  Significant  Worsening   Of  Current  Symptoms  And  Or  If    Any additional  New  Neurological  Symptoms                 Or  Significant  Concerns   Should  Call  911 or  Go  To  Emergency  Department  For  Further  Immediate  Evaluation.

## 2020-12-01 ENCOUNTER — OFFICE VISIT (OUTPATIENT)
Dept: OPTOMETRY | Age: 70
End: 2020-12-01
Payer: MEDICARE

## 2020-12-01 PROCEDURE — 99211 OFF/OP EST MAY X REQ PHY/QHP: CPT

## 2020-12-01 PROCEDURE — 99024 POSTOP FOLLOW-UP VISIT: CPT | Performed by: OPTOMETRIST

## 2020-12-01 ASSESSMENT — REFRACTION_MANIFEST
OD_AXIS: 050
OD_CYLINDER: -0.50
OD_ADD: +2.50
OS_CYLINDER: DS
OS_SPHERE: -0.50
OD_SPHERE: PLANO
OS_ADD: +2.50

## 2020-12-01 ASSESSMENT — KERATOMETRY
OS_K1POWER_DIOPTERS: 44.50
METHOD_AUTO_MANUAL: AUTOMATED
OS_AXISANGLE_DEGREES: 114
OS_AXISANGLE2_DEGREES: 024
OD_AXISANGLE2_DEGREES: 031
OD_AXISANGLE_DEGREES: 121
OD_K2POWER_DIOPTERS: 45.00
OS_K2POWER_DIOPTERS: 44.75
OD_K1POWER_DIOPTERS: 44.50

## 2020-12-01 ASSESSMENT — REFRACTION_WEARINGRX
OS_CYLINDER: -0.25
SPECS_TYPE: DO NOT FILL
OD_CYLINDER: -0.50
OS_AXIS: 002
OS_SPHERE: -0.50
OD_SPHERE: PLANO
OD_AXIS: 031

## 2020-12-01 ASSESSMENT — TONOMETRY
OD_IOP_MMHG: 19
OS_IOP_MMHG: 21
IOP_METHOD: NON-CONTACT AIR PUFF

## 2020-12-01 ASSESSMENT — VISUAL ACUITY
METHOD: SNELLEN - LINEAR
OD_SC: 20/50 OU
OS_SC+: -1
OS_SC: 20/20
OD_SC: 20/20

## 2020-12-01 ASSESSMENT — SLIT LAMP EXAM - LIDS
COMMENTS: NORMAL
COMMENTS: NORMAL

## 2020-12-01 NOTE — PROGRESS NOTES
Sumit Nguyen presents today for   Chief Complaint   Patient presents with    Blurred Vision    Post-Op Check   . HPI     Blurred Vision     In both eyes. Vision is blurred. Context:  reading. Comments     Final post op cataract surgery  OD: 2020  OS: 10/27/2020  All Drops finished   Reading is blurry  Vision seems good in the distance  Eyes are very comfortable                Current Outpatient Medications   Medication Sig Dispense Refill    clopidogrel (PLAVIX) 75 MG tablet take 1 tablet by mouth once daily 90 tablet 1    rOPINIRole (REQUIP) 1 MG tablet take 1 tablet by mouth at bedtime 90 tablet 1    atorvastatin (LIPITOR) 10 MG tablet take 1 tablet by mouth once daily 30 tablet 12    levothyroxine (SYNTHROID) 125 MCG tablet take 1 tablet by mouth once daily 90 tablet 1    calcium carbonate (OSCAL) 500 MG TABS tablet Take 500 mg by mouth daily      metoprolol tartrate (LOPRESSOR) 25 MG tablet Take 1 tablet by mouth 2 times daily (Patient taking differently: Take 12.5 mg by mouth 2 times daily ) 180 tablet 3    melatonin 5 MG TABS tablet Take 5 mg by mouth nightly      fluticasone (FLONASE) 50 MCG/ACT nasal spray 2 sprays to each nostril once daily. 1 Bottle 11    aspirin 81 MG tablet Take 81 mg by mouth daily.  Vitamin D (CHOLECALCIFEROL) 1000 UNITS CAPS capsule Take 3,000 Units by mouth daily       Multiple Vitamin (MULTI-VITAMINS PO) Take 1 tablet by mouth Daily.  albuterol sulfate HFA (PROVENTIL HFA) 108 (90 Base) MCG/ACT inhaler Inhale 2 puffs into the lungs every 6 hours as needed for Wheezing or Shortness of Breath (Patient not taking: Reported on 2020) 1 Inhaler 0     No current facility-administered medications for this visit.           Family History   Problem Relation Age of Onset    Stroke Mother     Other Mother          from pneumonia    Liver Disease Father         due to alcohol    Heart Disease Father         valve disease    Diabetes Father     Thyroid Disease Sister     Cancer Sister         breast cancer older sister   [facility] Diabetes Brother     High Cholesterol Sister     Diabetes Brother     Other Paternal Aunt         late onset alzheimers    Glaucoma Neg Hx     Cataracts Neg Hx      Social History     Socioeconomic History    Marital status:      Spouse name: None    Number of children: None    Years of education: None    Highest education level: None   Occupational History    Occupation: retired    Social Needs    Financial resource strain: None    Food insecurity     Worry: None     Inability: None    Transportation needs     Medical: None     Non-medical: None   Tobacco Use    Smoking status: Never Smoker    Smokeless tobacco: Never Used   Substance and Sexual Activity    Alcohol use: No    Drug use: No    Sexual activity: Not Currently     Partners: Male   Lifestyle    Physical activity     Days per week: None     Minutes per session: None    Stress: None   Relationships    Social connections     Talks on phone: None     Gets together: None     Attends Jainism service: None     Active member of club or organization: None     Attends meetings of clubs or organizations: None     Relationship status: None    Intimate partner violence     Fear of current or ex partner: None     Emotionally abused: None     Physically abused: None     Forced sexual activity: None   Other Topics Concern    None   Social History Narrative    None     Past Medical History:   Diagnosis Date    Acute confusion     CVA (cerebral infarction)     Hyperlipidemia     Hypothyroidism     [facility] infarction ([facility] [address]     Memory loss due to medical condition     Meralgia paresthetica     left side    Myopia with astigmatism and presbyopia     Perennial allergic rhinitis     RLS (restless legs syndrome)     TIA (transient ischemic attack) 2008, 1/06/2014    with brief confusion    VBI (vertebrobasilar insufficiency)            Main There are no Patient Instructions on file for this visit.    Return in about 1 year (around 12/1/2021) for complete eye exam.

## 2021-01-11 ENCOUNTER — OFFICE VISIT (OUTPATIENT)
Dept: CARDIOLOGY | Age: 71
End: 2021-01-11
Payer: MEDICARE

## 2021-01-11 VITALS
HEART RATE: 79 BPM | HEIGHT: 61 IN | DIASTOLIC BLOOD PRESSURE: 61 MMHG | BODY MASS INDEX: 35.68 KG/M2 | WEIGHT: 189 LBS | SYSTOLIC BLOOD PRESSURE: 124 MMHG

## 2021-01-11 DIAGNOSIS — I63.9 CEREBROVASCULAR ACCIDENT (CVA), UNSPECIFIED MECHANISM (HCC): ICD-10-CM

## 2021-01-11 DIAGNOSIS — I10 HYPERTENSION, ESSENTIAL: Primary | ICD-10-CM

## 2021-01-11 DIAGNOSIS — R94.31 ABNORMAL ECG: ICD-10-CM

## 2021-01-11 PROCEDURE — 93010 ELECTROCARDIOGRAM REPORT: CPT | Performed by: INTERNAL MEDICINE

## 2021-01-11 PROCEDURE — G8484 FLU IMMUNIZE NO ADMIN: HCPCS | Performed by: INTERNAL MEDICINE

## 2021-01-11 PROCEDURE — G8399 PT W/DXA RESULTS DOCUMENT: HCPCS | Performed by: INTERNAL MEDICINE

## 2021-01-11 PROCEDURE — G8427 DOCREV CUR MEDS BY ELIG CLIN: HCPCS | Performed by: INTERNAL MEDICINE

## 2021-01-11 PROCEDURE — 4040F PNEUMOC VAC/ADMIN/RCVD: CPT | Performed by: INTERNAL MEDICINE

## 2021-01-11 PROCEDURE — 93005 ELECTROCARDIOGRAM TRACING: CPT | Performed by: INTERNAL MEDICINE

## 2021-01-11 PROCEDURE — 99214 OFFICE O/P EST MOD 30 MIN: CPT | Performed by: INTERNAL MEDICINE

## 2021-01-11 PROCEDURE — 1090F PRES/ABSN URINE INCON ASSESS: CPT | Performed by: INTERNAL MEDICINE

## 2021-01-11 PROCEDURE — G8417 CALC BMI ABV UP PARAM F/U: HCPCS | Performed by: INTERNAL MEDICINE

## 2021-01-11 PROCEDURE — 99214 OFFICE O/P EST MOD 30 MIN: CPT

## 2021-01-11 PROCEDURE — 1036F TOBACCO NON-USER: CPT | Performed by: INTERNAL MEDICINE

## 2021-01-11 PROCEDURE — 3017F COLORECTAL CA SCREEN DOC REV: CPT | Performed by: INTERNAL MEDICINE

## 2021-01-11 PROCEDURE — 1123F ACP DISCUSS/DSCN MKR DOCD: CPT | Performed by: INTERNAL MEDICINE

## 2021-01-11 NOTE — PROGRESS NOTES
Today's Date: 1/11/2021  Patient's Name: Jaci Mcpherson  Patient's age: 65 y.o., 1950    CC: HTN, DL    HPI:  The patient is a 65y.o. year old, , female is in the office for F/U. No chest pains, no sob, no palpitations, no le edema, no orthopnea, no PND. Does aerobics 3x/week. Walks daily. Past Medical History:   has a past medical history of Acute confusion, CVA (cerebral infarction), Hyperlipidemia, Hypothyroidism, Lacunar infarction ([facility] [address] Memory loss due to medical condition, Meralgia paresthetica, Myopia with astigmatism and presbyopia, Perennial allergic rhinitis, RLS (restless legs syndrome), TIA (transient ischemic attack), and VBI (vertebrobasilar insufficiency). Past Surgical History:   has a past surgical history that includes pre-malignant / benign skin lesion excision; [place] tooth extraction (1981); Cardiac catheterization (9/2014); Colonoscopy (02/25/2015); Insertable Cardiac Monitor (4/22/16); Intracapsular cataract extraction (Right, 9/29/2020); and Intracapsular cataract extraction (Left, 10/27/2020). Home Medications:  Prior to Admission medications    Medication Sig Start Date End Date Taking?  Authorizing Provider   clopidogrel (PLAVIX) 75 MG tablet take 1 tablet by mouth once daily 11/6/20  Yes Latonya Gregory MD   rOPINIRole (REQUIP) 1 MG tablet take 1 tablet by mouth at bedtime 11/6/20  Yes Latonya Gregory MD   atorvastatin (LIPITOR) 10 MG tablet take 1 tablet by mouth once daily 11/4/20  Yes Kemal Red DO   levothyroxine (SYNTHROID) 125 MCG tablet take 1 tablet by mouth once daily 10/12/20  Yes ARCENIO Lemus   calcium carbonate (OSCAL) 500 MG TABS tablet Take 500 mg by mouth daily   Yes Historical Provider, MD   metoprolol tartrate (LOPRESSOR) 25 MG tablet Take 1 tablet by mouth 2 times daily  Patient taking differently: Take 12.5 mg by mouth 2 times daily  4/7/20  Yes Krystal Red DO melatonin 5 MG TABS tablet Take 5 mg by mouth nightly   Yes Historical Provider, MD   aspirin 81 MG tablet Take 81 mg by mouth daily. Yes Historical Provider, MD   Vitamin D (CHOLECALCIFEROL) 1000 UNITS CAPS capsule Take 3,000 Units by mouth daily    Yes Historical Provider, MD   Multiple Vitamin (MULTI-VITAMINS PO) Take 1 tablet by mouth Daily. Yes Historical Provider, MD   albuterol sulfate HFA (PROVENTIL HFA) 108 (90 Base) MCG/ACT inhaler Inhale 2 puffs into the lungs every 6 hours as needed for Wheezing or Shortness of Breath  Patient not taking: Reported on 12/1/2020 12/16/19   NORMAN Conroy CNP   fluticasone (FLONASE) 50 MCG/ACT nasal spray 2 sprays to each nostril once daily. Patient not taking: Reported on 1/11/2021 2/22/17   NORMAN Esposito CNP     Allergies:  Sulfamethoxazole-trimethoprim and Penicillins    Social History:   reports that she has never smoked. She has never used smokeless tobacco. She reports that she does not drink alcohol or use drugs. Review of Systems:  · Constitutional: there has been no unanticipated weight loss. There's been No change in energy level, No change in activity level. · Eyes: No visual changes or diplopia. No scleral icterus. · ENT: No Headaches, hearing loss or vertigo. No mouth sores or sore throat. · Cardiovascular: As above. · Respiratory: as hpi  · Gastrointestinal: No abdominal pain, appetite loss, blood in stools. No change in bowel or bladder habits. · Genitourinary: No dysuria, trouble voiding, or hematuria. · Musculoskeletal:  No gait disturbance, No weakness or joint complaints. · Integumentary: No rash or pruritis. · Neurological: No headache, diplopia, change in muscle strength, numbness or tingling. No change in gait, balance, coordination, mood, affect, memory, mentation, behavior. · Psychiatric: No anxiety, or depression.  Hyperlipidemia       Past Surgical History:     Past Surgical History:   Procedure Laterality Date    COLONOSCOPY N/A 9/13/2019    COLONOSCOPY biopsy sigmoid colon polyp performed by Juan Carlos Gray MD at [address]    endometriosis    HYSTERECTOMY, TOTAL ABDOMINAL      one ovary remains    LUMBAR FUSION Left 11/10/2017    LEFT L4-L5, L5-S1 TRANSFORAMINAL LUMBAR INTERBODY FUSION WITH with CT navigation    LUMBAR NERVE BLOCK N/A 9/18/2019    L3L4 INTERLAMINAR EPIDURAL STEROID INJECTION WITH FLUOROSCOPY performed by Deanna Grant MD at [address] [place] [address] Right 8/5/2019    RIGHT L3 AND L4 TRANSFORAMINAL EPIDURAL STEROID INJECTION WITH FLUOROSCOPY performed by Deanna Grant MD at [address] [facility] SALPINGO-OOPHORECTOMY      unilat - rt - [address] [facility] [place], operated by [facility]    UPPER GASTROINTESTINAL ENDOSCOPY N/A 9/13/2019    EGD gastric BIOPSY and GE Junction biopsy and esophageal brush for radha performed by Juan Carlos Gray MD at [facility] ENDOSCOPY     Current Medications:     Current Outpatient Medications:     topiramate (TOPAMAX) 100 MG tablet, Take 1 tablet by mouth 2 times daily, Disp: 60 tablet, Rfl: 5    tacrolimus (PROTOPIC) 0.1 % ointment, Apply to affected area BID, Disp: 30 g, Rfl: 4    fexofenadine (ALLEGRA) 180 MG tablet, Take 1 tablet by mouth daily Stop zyrtec, Disp: 30 tablet, Rfl: 5    EST ESTROGENS-METHYLTEST DS 1.25-2.5 MG TABS, Take 1 each by mouth daily. , Disp: 30 tablet, Rfl: 5    calcium carbonate-vitamin D (CALCIUM 600+D) 600-200 MG-UNIT TABS, Take 1 each by mouth 2 times daily, Disp: 60 tablet, Rfl: 11    lansoprazole (PREVACID) 30 MG delayed release capsule, TAKE 1 CAPSULE BY MOUTH ONCE DAILY, Disp: , Rfl: 3    pantoprazole (PROTONIX) 40 MG tablet, Take 1 tablet by mouth 2 times daily (before meals), Disp: 60 tablet, Rfl: 1    Cholecalciferol (VITAMIN D) 2000 units CAPS capsule, Take 1 capsule by mouth daily, Disp: 30 capsule, Rfl: 1   · Endocrine: No temperature intolerance. No excessive thirst, fluid intake, or urination. No tremor. · Hematologic/Lymphatic: No abnormal bruising or bleeding, blood clots or swollen lymph nodes. · Allergic/Immunologic: No nasal congestion or hives. Physical Exam:  /61 (Site: Right Upper Arm, Position: Sitting, Cuff Size: Medium Adult)   Pulse 79   Ht 5' 1\" (1.549 m)   Wt 189 lb (85.7 kg)   LMP 01/01/1998   BMI 35.71 kg/m²   General appearance: alert and cooperative with exam  HEENT: Head: Normocephalic, no lesions, without obvious abnormality. Eyes: PERRL, EOMI  Ears: Not obvious deformations or lack of hearing  Neck: no carotid bruit, no JVD  Lungs: clear to auscultation bilaterally  Heart: regular rate and rhythm, S1, S2 normal, no murmur, click, rub or gallop  Abdomen: soft, non-tender; bowel sounds normal; no masses,  no organomegaly  Extremities: extremities normal, atraumatic, no cyanosis or edema  Neurologic: Mental status: Alert, oriented, thought content appropriate  Skin: WNL for age and condition, no obvious rashes or leasions    Cardiac Data:  Diagnostics:    EKG: Sinus  Rhythm   Low voltage in precordial leads.    -Old inferior infarct  -Prominent R(V1) -true posterior extension of inferior MI  -Left axis -may be secondary to infarct.      Labs:   Lab Results   Component Value Date    CHOL 166 02/05/2019    TRIG 130 02/05/2019    HDL 46 02/11/2020    LDLCHOLESTEROL 93 02/11/2020    VLDL NOT REPORTED 02/11/2020    CHOLHDLRATIO 3.5 02/11/2020       Lab Results   Component Value Date     (H) 02/05/2019    K 4.1 02/05/2019     02/05/2019    CO2 28 02/05/2019    BUN 16 02/05/2019    CREATININE 0.70 02/05/2019    GLUCOSE 106 (H) 02/05/2019    CALCIUM 9.6 02/05/2019    PROT 7.5 02/05/2019    LABALBU 4.4 02/05/2019    BILITOT 1.10 02/05/2019    ALKPHOS 101 02/05/2019    AST 22 02/05/2019    ALT 24 02/05/2019    LABGLOM >60 02/05/2019    GFRAA >60 02/05/2019     Assessment and plan: 1. Abnormal ECG- possible old inferior MI. Will update Echo. Denies any CP, is staying active with no complaints. 2. H/o Normal coronaries on cardiac cath 09/2014  3. HTN: well controlled. 4. HLP: on statin  5. Preserved LV systolic function on Echo from 1/8/14. 6. Loop recorder placed March 27, 2014 and extracted 04/2016 with no documented arrhythmias. 7. CVA/TIA on 1/14/14 at hospital: on ASA and Plavix. 8. Hypothyroidism. The patient is to continue heart healthy diet, weight loss and exercise as tolerated. Patient's medications and side effects were discussed. Medication refills were provided if needed. Follow up appointment timing was discussed. All questions and concerns were addressed to patient's satisfaction. The patient is to follow up in 12 months or sooner if necessary. Thank you for allowing me to participate in the care of this patient, please do not hesitate to call if you have any questions. Prudence Quinn DO, [facility], [address], [address] [facility]  [email or web address]  [phone]

## 2021-01-18 ENCOUNTER — HOSPITAL ENCOUNTER (OUTPATIENT)
Dept: NON INVASIVE DIAGNOSTICS | Age: 71
Discharge: HOME | End: 2021-01-18
Payer: MEDICARE

## 2021-01-18 DIAGNOSIS — I10 HYPERTENSION, ESSENTIAL: ICD-10-CM

## 2021-01-18 DIAGNOSIS — I63.9 CEREBROVASCULAR ACCIDENT (CVA), UNSPECIFIED MECHANISM (HCC): ICD-10-CM

## 2021-01-18 DIAGNOSIS — R94.31 ABNORMAL ECG: ICD-10-CM

## 2021-01-18 LAB
LV EF: 60 %
LVEF MODALITY: NORMAL

## 2021-01-18 PROCEDURE — 93306 TTE W/DOPPLER COMPLETE: CPT

## 2021-01-27 ENCOUNTER — HOSPITAL ENCOUNTER (OUTPATIENT)
Dept: LAB | Age: 71
Discharge: HOME | End: 2021-01-27
Payer: MEDICARE

## 2021-01-27 DIAGNOSIS — R73.01 IFG (IMPAIRED FASTING GLUCOSE): ICD-10-CM

## 2021-01-27 LAB
ALBUMIN SERPL-MCNC: 4.2 G/DL (ref 3.5–5.2)
ALBUMIN/GLOBULIN RATIO: 1.3 (ref 1–2.5)
ALP BLD-CCNC: 96 U/L (ref 35–104)
ALT SERPL-CCNC: 13 U/L (ref 5–33)
ANION GAP SERPL CALCULATED.3IONS-SCNC: 8 MMOL/L (ref 9–17)
AST SERPL-CCNC: 17 U/L
BILIRUB SERPL-MCNC: 0.96 MG/DL (ref 0.3–1.2)
BUN BLDV-MCNC: 18 MG/DL (ref 8–23)
BUN/CREAT BLD: 28 (ref 9–20)
CALCIUM SERPL-MCNC: 10 MG/DL (ref 8.6–10.4)
CHLORIDE BLD-SCNC: 102 MMOL/L (ref 98–107)
CO2: 29 MMOL/L (ref 20–31)
CREAT SERPL-MCNC: 0.65 MG/DL (ref 0.5–0.9)
ESTIMATED AVERAGE GLUCOSE: 120 MG/DL
GFR AFRICAN AMERICAN: >60 ML/MIN
GFR NON-AFRICAN AMERICAN: >60 ML/MIN
GFR SERPL CREATININE-BSD FRML MDRD: ABNORMAL ML/MIN/{1.73_M2}
GFR SERPL CREATININE-BSD FRML MDRD: ABNORMAL ML/MIN/{1.73_M2}
GLUCOSE BLD-MCNC: 107 MG/DL (ref 70–99)
HBA1C MFR BLD: 5.8 % (ref 4–6)
POTASSIUM SERPL-SCNC: 4.3 MMOL/L (ref 3.7–5.3)
SODIUM BLD-SCNC: 139 MMOL/L (ref 135–144)
TOTAL PROTEIN: 7.4 G/DL (ref 6.4–8.3)

## 2021-01-27 PROCEDURE — 80053 COMPREHEN METABOLIC PANEL: CPT

## 2021-01-27 PROCEDURE — 83036 HEMOGLOBIN GLYCOSYLATED A1C: CPT

## 2021-01-27 PROCEDURE — 36415 COLL VENOUS BLD VENIPUNCTURE: CPT

## 2021-02-03 ENCOUNTER — OFFICE VISIT (OUTPATIENT)
Dept: FAMILY MEDICINE CLINIC | Age: 71
End: 2021-02-03
Payer: MEDICARE

## 2021-02-03 ENCOUNTER — HOSPITAL ENCOUNTER (OUTPATIENT)
Dept: LAB | Age: 71
Discharge: HOME | End: 2021-02-03
Payer: MEDICARE

## 2021-02-03 VITALS
HEART RATE: 72 BPM | DIASTOLIC BLOOD PRESSURE: 78 MMHG | RESPIRATION RATE: 20 BRPM | WEIGHT: 189.8 LBS | OXYGEN SATURATION: 99 % | TEMPERATURE: 98 F | HEIGHT: 61 IN | BODY MASS INDEX: 35.83 KG/M2 | SYSTOLIC BLOOD PRESSURE: 110 MMHG

## 2021-02-03 DIAGNOSIS — Z13.220 SCREENING FOR HYPERLIPIDEMIA: ICD-10-CM

## 2021-02-03 DIAGNOSIS — Z11.59 NEED FOR HEPATITIS C SCREENING TEST: ICD-10-CM

## 2021-02-03 DIAGNOSIS — I63.019 CEREBRAL INFARCTION DUE TO THROMBOSIS OF VERTEBRAL ARTERY, UNSPECIFIED BLOOD VESSEL LATERALITY (HCC): ICD-10-CM

## 2021-02-03 DIAGNOSIS — M19.042 PRIMARY OSTEOARTHRITIS OF BOTH HANDS: ICD-10-CM

## 2021-02-03 DIAGNOSIS — E03.9 ACQUIRED HYPOTHYROIDISM: ICD-10-CM

## 2021-02-03 DIAGNOSIS — M19.041 PRIMARY OSTEOARTHRITIS OF BOTH HANDS: ICD-10-CM

## 2021-02-03 DIAGNOSIS — R73.01 IFG (IMPAIRED FASTING GLUCOSE): ICD-10-CM

## 2021-02-03 DIAGNOSIS — E78.00 PURE HYPERCHOLESTEROLEMIA: ICD-10-CM

## 2021-02-03 DIAGNOSIS — E03.9 ACQUIRED HYPOTHYROIDISM: Primary | ICD-10-CM

## 2021-02-03 DIAGNOSIS — I63.81 LACUNAR INFARCTION (HCC): ICD-10-CM

## 2021-02-03 DIAGNOSIS — L30.4 INTERTRIGO: ICD-10-CM

## 2021-02-03 LAB
CHOLESTEROL, FASTING: 166 MG/DL
CHOLESTEROL/HDL RATIO: 3.3
HDLC SERPL-MCNC: 51 MG/DL
HEPATITIS C ANTIBODY: NONREACTIVE
LDL CHOLESTEROL: 74 MG/DL (ref 0–130)
TRIGLYCERIDE, FASTING: 205 MG/DL
TSH SERPL DL<=0.05 MIU/L-ACNC: 0.66 MIU/L (ref 0.3–5)
VLDLC SERPL CALC-MCNC: ABNORMAL MG/DL (ref 1–30)

## 2021-02-03 PROCEDURE — 80061 LIPID PANEL: CPT

## 2021-02-03 PROCEDURE — 1090F PRES/ABSN URINE INCON ASSESS: CPT | Performed by: PHYSICIAN ASSISTANT

## 2021-02-03 PROCEDURE — 1036F TOBACCO NON-USER: CPT | Performed by: PHYSICIAN ASSISTANT

## 2021-02-03 PROCEDURE — 3017F COLORECTAL CA SCREEN DOC REV: CPT | Performed by: PHYSICIAN ASSISTANT

## 2021-02-03 PROCEDURE — 99212 OFFICE O/P EST SF 10 MIN: CPT | Performed by: PHYSICIAN ASSISTANT

## 2021-02-03 PROCEDURE — 86803 HEPATITIS C AB TEST: CPT

## 2021-02-03 PROCEDURE — 99214 OFFICE O/P EST MOD 30 MIN: CPT | Performed by: PHYSICIAN ASSISTANT

## 2021-02-03 PROCEDURE — G8427 DOCREV CUR MEDS BY ELIG CLIN: HCPCS | Performed by: PHYSICIAN ASSISTANT

## 2021-02-03 PROCEDURE — G8399 PT W/DXA RESULTS DOCUMENT: HCPCS | Performed by: PHYSICIAN ASSISTANT

## 2021-02-03 PROCEDURE — 36415 COLL VENOUS BLD VENIPUNCTURE: CPT

## 2021-02-03 PROCEDURE — 4040F PNEUMOC VAC/ADMIN/RCVD: CPT | Performed by: PHYSICIAN ASSISTANT

## 2021-02-03 PROCEDURE — G8417 CALC BMI ABV UP PARAM F/U: HCPCS | Performed by: PHYSICIAN ASSISTANT

## 2021-02-03 PROCEDURE — G8484 FLU IMMUNIZE NO ADMIN: HCPCS | Performed by: PHYSICIAN ASSISTANT

## 2021-02-03 PROCEDURE — 84443 ASSAY THYROID STIM HORMONE: CPT

## 2021-02-03 PROCEDURE — 1123F ACP DISCUSS/DSCN MKR DOCD: CPT | Performed by: PHYSICIAN ASSISTANT

## 2021-02-03 RX ORDER — CLOTRIMAZOLE AND BETAMETHASONE DIPROPIONATE 10; .64 MG/G; MG/G
CREAM TOPICAL
Qty: 30 G | Refills: 2 | Status: SHIPPED | OUTPATIENT
Start: 2021-02-03

## 2021-02-03 ASSESSMENT — PATIENT HEALTH QUESTIONNAIRE - PHQ9
2. FEELING DOWN, DEPRESSED OR HOPELESS: 0
SUM OF ALL RESPONSES TO PHQ9 QUESTIONS 1 & 2: 0
SUM OF ALL RESPONSES TO PHQ QUESTIONS 1-9: 0
SUM OF ALL RESPONSES TO PHQ QUESTIONS 1-9: 0
1. LITTLE INTEREST OR PLEASURE IN DOING THINGS: 0

## 2021-02-03 ASSESSMENT — ENCOUNTER SYMPTOMS
SHORTNESS OF BREATH: 0
DIARRHEA: 0
VOMITING: 0
SINUS PAIN: 0
TROUBLE SWALLOWING: 0
CHEST TIGHTNESS: 0
SORE THROAT: 0
COUGH: 0
WHEEZING: 0
RHINORRHEA: 0
SINUS PRESSURE: 0
NAUSEA: 0

## 2021-02-03 NOTE — PROGRESS NOTES
[facility]    Subjective:      Patient ID: Emili Milligan is a 65 y.o. y.o. female, here a Established patient  Is here today to discuss Labs, Medications and Other Health Concerns    Patient is seen for six month follow up she is doing well no problems. Needs refills on lotrisone. Frustrated with her weight. Is in aerobics 3 times and walks. She knows it is what she is eating. Does not use artificial sweetners they make her feel jittery. No recent illness. Scheduled to get covid vaccine with school but may just get here.       Past Medical History:   Diagnosis Date    Acute confusion     CVA (cerebral infarction)     Hyperlipidemia     Hypothyroidism     Lacunar infarction ([facility] [address]     Memory loss due to medical condition     Meralgia paresthetica     left side    Myopia with astigmatism and presbyopia     Perennial allergic rhinitis     RLS (restless legs syndrome)     TIA (transient ischemic attack) , 2014    with brief confusion    VBI (vertebrobasilar insufficiency)        Past Surgical History:   Procedure Laterality Date    [facility]ATION  [facility]    [facility]   nl cath    CATARACT REMOVAL Bilateral     COLONOSCOPY  2015    mild diverticulosis:  Kari BERNARD    INSERTABLE CARDIAC MONITOR  16    removed    INTRACAPSULAR CATARACT EXTRACTION Right 2020    Right Cataract Extraction w/ IOL Implant performed by Helena Melgar MD at [place] [address] Left 10/27/2020    Left Cataract Extraction w/ IOL Implant performed by Helena Melgar MD at [address] [place] [address] PRE-MALIGNANT / [address]      hemangioma removal on right foot 2nd toe    WISDOM TOOTH EXTRACTION         Family History   Problem Relation Age of Onset    Stroke Mother     Other Mother          from pneumonia    Liver Disease Father         due to alcohol    Heart Disease Father         valve disease  Diabetes Father     Thyroid Disease Sister     Cancer Sister         breast cancer older sister    Diabetes Brother     High Cholesterol Sister     Diabetes Brother     Other Paternal Aunt         late onset alzheimers    Glaucoma Neg Hx     Cataracts Neg Hx        Allergies   Allergen Reactions    Sulfamethoxazole-Trimethoprim Swelling     lips    Penicillins Rash       Current Outpatient Medications   Medication Sig Dispense Refill    clotrimazole-betamethasone (LOTRISONE) 1-0.05 % cream Apply topically 2 times daily prn. 30 g 2    clopidogrel (PLAVIX) 75 MG tablet take 1 tablet by mouth once daily 90 tablet 1    rOPINIRole (REQUIP) 1 MG tablet take 1 tablet by mouth at bedtime 90 tablet 1    atorvastatin (LIPITOR) 10 MG tablet take 1 tablet by mouth once daily 30 tablet 12    levothyroxine (SYNTHROID) 125 MCG tablet take 1 tablet by mouth once daily 90 tablet 1    calcium carbonate (OSCAL) 500 MG TABS tablet Take 500 mg by mouth daily      metoprolol tartrate (LOPRESSOR) 25 MG tablet Take 1 tablet by mouth 2 times daily (Patient taking differently: Take 12.5 mg by mouth 2 times daily ) 180 tablet 3    albuterol sulfate HFA (PROVENTIL HFA) 108 (90 Base) MCG/ACT inhaler Inhale 2 puffs into the lungs every 6 hours as needed for Wheezing or Shortness of Breath 1 Inhaler 0    melatonin 5 MG TABS tablet Take 5 mg by mouth nightly      fluticasone (FLONASE) 50 MCG/ACT nasal spray 2 sprays to each nostril once daily. 1 Bottle 11    aspirin 81 MG tablet Take 81 mg by mouth daily.  Vitamin D (CHOLECALCIFEROL) 1000 UNITS CAPS capsule Take 3,000 Units by mouth daily       Multiple Vitamin (MULTI-VITAMINS PO) Take 1 tablet by mouth Daily. No current facility-administered medications for this visit. Review of Systems   Constitutional: Negative for appetite change, chills, fatigue and fever. HENT: Negative for congestion, postnasal drip, rhinorrhea, sinus pressure, sinus pain, sneezing, sore throat and trouble swallowing. Eyes:        ANGELA 11/2020 Dr. Jean Claude Gore. Had cataract surgery with Dr. Zara Lopez. Only has reading glasses no contacts. Respiratory: Negative for cough, chest tightness, shortness of breath and wheezing. Cardiovascular: Negative for chest pain, palpitations and leg swelling. Gastrointestinal: Negative for diarrhea, nausea and vomiting. Genitourinary: Negative for difficulty urinating and dysuria. Musculoskeletal: Negative for arthralgias, gait problem and myalgias. Was having pain in hands no problems for a few months ago. Her jaw was bothering her saw chiropractor her adjusted her and it feels better. Her bite was off and now better. Dr. Chandrakant Dorman. Skin: Negative for rash. Neurological: Negative for dizziness, light-headedness, numbness and headaches. Psychiatric/Behavioral: Negative for sleep disturbance. The patient is not nervous/anxious. Objective:      /78 (Site: Right Upper Arm, Position: Sitting, Cuff Size: Medium Adult)   Pulse 72   Temp 98 °F (36.7 °C) (Tympanic)   Resp 20   Ht 5' 1\" (1.549 m)   Wt 189 lb 12.8 oz (86.1 kg)   LMP 01/01/1998   SpO2 99%   Breastfeeding No   BMI 35.86 kg/m²     Physical Exam  Vitals signs and nursing note reviewed. Constitutional:       General: She is not in acute distress. Appearance: Normal appearance. She is well-developed. She is not ill-appearing. HENT:      Head: Normocephalic and atraumatic. Right Ear: Tympanic membrane, ear canal and external ear normal.      Left Ear: Tympanic membrane, ear canal and external ear normal.      Nose: Nose normal. No congestion or rhinorrhea. Mouth/Throat:      Mouth: Mucous membranes are moist.      Pharynx: No oropharyngeal exudate or posterior oropharyngeal erythema. Eyes:      General: No scleral icterus. Conjunctiva/sclera: Conjunctivae normal.   Neck:      Musculoskeletal: Normal range of motion and neck supple. No neck rigidity or muscular tenderness. Thyroid: No thyroid mass, thyromegaly or thyroid tenderness. Vascular: No carotid bruit. Cardiovascular:      Rate and Rhythm: Normal rate and regular rhythm. Heart sounds: Normal heart sounds. No murmur. No gallop. Pulmonary:      Effort: Pulmonary effort is normal. No respiratory distress. Breath sounds: Normal breath sounds. No wheezing, rhonchi or rales. Abdominal:      General: Bowel sounds are normal. There is no distension. Palpations: Abdomen is soft. There is no mass. Tenderness: There is no abdominal tenderness. There is no guarding or rebound. Hernia: No hernia is present. Musculoskeletal:         General: No swelling, tenderness, deformity or signs of injury. Right lower leg: No edema. Left lower leg: No edema. Lymphadenopathy:      Cervical: No cervical adenopathy. Skin:     General: Skin is warm and dry. Findings: No erythema or rash. Neurological:      General: No focal deficit present. Mental Status: She is alert and oriented to person, place, and time. Gait: Gait normal.   Psychiatric:         Mood and Affect: Mood normal.         Behavior: Behavior normal.         Thought Content:  Thought content normal.         Judgment: Judgment normal.       Hospital Outpatient Visit on 02/03/2021   Component Date Value Ref Range Status    Cholesterol, Fasting 02/03/2021 166  <200 mg/dL Final    Comment:    Cholesterol Guidelines:      <200  Desirable   200-240  Borderline      >240  Undesirable         HDL 02/03/2021 51  >40 mg/dL Final    Comment:    HDL Guidelines:    <40     Undesirable   40-59    Borderline    >59     Desirable         LDL Cholesterol 02/03/2021 74  0 - 130 mg/dL Final    Comment:    LDL Guidelines:     <100    Desirable   100-129   Near to/above Desirable 130-159   Borderline      >159   Undesirable     Direct (measured) LDL and calculated LDL are not interchangeable tests.  Chol/HDL Ratio 02/03/2021 3.3  <5 Final            Triglyceride, Fasting 02/03/2021 205* <150 mg/dL Final    Comment:    Triglyceride Guidelines:     <150   Desirable   150-199  Borderline   200-499  High     >499   Very high   Based on AHA Guidelines for fasting triglyceride, October 2012.  VLDL 02/03/2021 NOT REPORTED* 1 - 30 mg/dL Final    Hepatitis C Ab 02/03/2021 NONREACTIVE  NONREACTIVE Final    Comment:       The hepatitis C procedure used in our laboratory is a Chemiluminescent test specific for   three recombinant HCV antigens. A negative anti-HCV result indicates that the antibodies to   hepatitis C virus are not present at this time. Individuals with reactive anti-HCV should be considered infected and infectious until proven   otherwise. Confirmation of all equivocal or reactive results is recommended by ordering   HCV RNA by PCR.  TSH 02/03/2021 0.66  0.30 - 5.00 mIU/L Final   Hospital Outpatient Visit on 01/27/2021   Component Date Value Ref Range Status    Hemoglobin A1C 01/27/2021 5.8  4.0 - 6.0 % Final    Estimated Avg Glucose 01/27/2021 120  mg/dL Final    Comment: The ADA and AACC recommend providing the estimated average glucose result to permit better   patient understanding of their HBA1c result.       Glucose 01/27/2021 107* 70 - 99 mg/dL Final    BUN 01/27/2021 18  8 - 23 mg/dL Final    CREATININE 01/27/2021 0.65  0.50 - 0.90 mg/dL Final    Bun/Cre Ratio 01/27/2021 28* 9 - 20 Final    Calcium 01/27/2021 10.0  8.6 - 10.4 mg/dL Final    Sodium 01/27/2021 139  135 - 144 mmol/L Final    Potassium 01/27/2021 4.3  3.7 - 5.3 mmol/L Final    Chloride 01/27/2021 102  98 - 107 mmol/L Final    CO2 01/27/2021 29  20 - 31 mmol/L Final    Anion Gap 01/27/2021 8* 9 - 17 mmol/L Final    Alkaline Phosphatase 01/27/2021 96  35 - 104 U/L Final  ALT 01/27/2021 13  5 - 33 U/L Final    AST 01/27/2021 17  <32 U/L Final    Total Bilirubin 01/27/2021 0.96  0.3 - 1.2 mg/dL Final    Total Protein 01/27/2021 7.4  6.4 - 8.3 g/dL Final    Albumin 01/27/2021 4.2  3.5 - 5.2 g/dL Final    Albumin/Globulin Ratio 01/27/2021 1.3  1.0 - 2.5 Final    GFR Non- 01/27/2021 >60  >60 mL/min Final    GFR  01/27/2021 >60  >60 mL/min Final    GFR Comment 01/27/2021        Final    Comment: Average GFR for 79or more years old:   76 mL/min/1.73sq m  Chronic Kidney Disease:   <60 mL/min/1.73sq m  Kidney failure:   <15 mL/min/1.73sq m              eGFR calculated using average adult body mass. Additional eGFR calculator available at:        locr.br            GFR Staging 01/27/2021 NOT REPORTED   Final   Hospital Outpatient Visit on 01/18/2021   Component Date Value Ref Range Status    Left Ventricular Ejection Fraction 01/18/2021 60   Final-Edited    LVEF MODALITY 01/18/2021 ECHO   Final-Edited         Assessment & Plan     1. Acquired hypothyroidism  -     TSH without Reflex; Future  2. Screening for hyperlipidemia  -     Lipid, Fasting; Future  3. Need for hepatitis C screening test  -     Hepatitis C Antibody; Future  4. Intertrigo  -     clotrimazole-betamethasone (LOTRISONE) 1-0.05 % cream; Apply topically 2 times daily prn., Disp-30 g, R-2, Normal  5. IFG (impaired fasting glucose)  6. 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 Pure hypercholesterolemia  8. Primary osteoarthritis of both hands  9.  Lacunar infarction [facility])       Reviewed recent labs  Fasting labs can be repeated in 1 year they have been stable  TSH free T4 I want repeated April 2021 to monitor work trends  Fall prevention  Good nutrition hydration  Answered her questions  She is very active walks regularly continue working on lifestyle changes

## 2021-02-04 DIAGNOSIS — E78.2 MIXED HYPERLIPIDEMIA: Primary | ICD-10-CM

## 2021-02-05 DIAGNOSIS — E03.9 ACQUIRED HYPOTHYROIDISM: Primary | ICD-10-CM

## 2021-04-02 ENCOUNTER — HOSPITAL ENCOUNTER (OUTPATIENT)
Dept: LAB | Age: 71
Discharge: HOME | End: 2021-04-02
Payer: MEDICARE

## 2021-04-02 DIAGNOSIS — E03.9 ACQUIRED HYPOTHYROIDISM: ICD-10-CM

## 2021-04-02 LAB
THYROXINE, FREE: 1.51 NG/DL (ref 0.93–1.7)
TSH SERPL DL<=0.05 MIU/L-ACNC: 2.12 MIU/L (ref 0.3–5)

## 2021-04-02 PROCEDURE — 36415 COLL VENOUS BLD VENIPUNCTURE: CPT

## 2021-04-02 PROCEDURE — 84443 ASSAY THYROID STIM HORMONE: CPT

## 2021-04-02 PROCEDURE — 84439 ASSAY OF FREE THYROXINE: CPT

## 2021-05-13 DIAGNOSIS — E03.9 ACQUIRED HYPOTHYROIDISM: ICD-10-CM

## 2021-05-13 RX ORDER — LEVOTHYROXINE SODIUM 0.12 MG/1
TABLET ORAL
Qty: 90 TABLET | Refills: 1 | Status: SHIPPED | OUTPATIENT
Start: 2021-05-13 | End: 2021-10-27 | Stop reason: SDUPTHER

## 2021-05-19 DIAGNOSIS — G25.81 RLS (RESTLESS LEGS SYNDROME): ICD-10-CM

## 2021-05-19 RX ORDER — ROPINIROLE 1 MG/1
TABLET, FILM COATED ORAL
Qty: 90 TABLET | Refills: 1 | Status: SHIPPED | OUTPATIENT
Start: 2021-05-19 | End: 2021-11-08 | Stop reason: SDUPTHER

## 2021-06-11 ENCOUNTER — OFFICE VISIT (OUTPATIENT)
Dept: NEUROLOGY | Age: 71
End: 2021-06-11
Payer: MEDICARE

## 2021-06-11 VITALS
SYSTOLIC BLOOD PRESSURE: 110 MMHG | BODY MASS INDEX: 35.72 KG/M2 | HEART RATE: 100 BPM | DIASTOLIC BLOOD PRESSURE: 64 MMHG | OXYGEN SATURATION: 97 % | HEIGHT: 61 IN | WEIGHT: 189.2 LBS

## 2021-06-11 DIAGNOSIS — R42 DIZZINESS: ICD-10-CM

## 2021-06-11 DIAGNOSIS — G45.0 VBI (VERTEBROBASILAR INSUFFICIENCY): ICD-10-CM

## 2021-06-11 DIAGNOSIS — G57.12 MERALGIA PARESTHETICA OF LEFT SIDE: ICD-10-CM

## 2021-06-11 DIAGNOSIS — R41.3 MEMORY LOSS DUE TO MEDICAL CONDITION: ICD-10-CM

## 2021-06-11 DIAGNOSIS — I63.9 CEREBRAL INFARCTION, UNSPECIFIED MECHANISM (HCC): ICD-10-CM

## 2021-06-11 DIAGNOSIS — E03.9 ACQUIRED HYPOTHYROIDISM: ICD-10-CM

## 2021-06-11 DIAGNOSIS — G25.81 RLS (RESTLESS LEGS SYNDROME): ICD-10-CM

## 2021-06-11 DIAGNOSIS — I69.90 LATE EFFECTS OF CVA (CEREBROVASCULAR ACCIDENT): ICD-10-CM

## 2021-06-11 DIAGNOSIS — I63.81 LACUNAR INFARCTION (HCC): ICD-10-CM

## 2021-06-11 DIAGNOSIS — I63.00 CEREBROVASCULAR ACCIDENT (CVA) DUE TO THROMBOSIS OF PRECEREBRAL ARTERY (HCC): ICD-10-CM

## 2021-06-11 DIAGNOSIS — E78.2 MIXED HYPERLIPIDEMIA: ICD-10-CM

## 2021-06-11 DIAGNOSIS — Z86.73 H/O TIA (TRANSIENT ISCHEMIC ATTACK) AND STROKE: Primary | ICD-10-CM

## 2021-06-11 DIAGNOSIS — G57.10 MERALGIA PARESTHETICA, UNSPECIFIED LATERALITY: ICD-10-CM

## 2021-06-11 PROCEDURE — 99215 OFFICE O/P EST HI 40 MIN: CPT | Performed by: PSYCHIATRY & NEUROLOGY

## 2021-06-11 PROCEDURE — 1036F TOBACCO NON-USER: CPT | Performed by: PSYCHIATRY & NEUROLOGY

## 2021-06-11 PROCEDURE — G8417 CALC BMI ABV UP PARAM F/U: HCPCS | Performed by: PSYCHIATRY & NEUROLOGY

## 2021-06-11 PROCEDURE — 99213 OFFICE O/P EST LOW 20 MIN: CPT

## 2021-06-11 PROCEDURE — G8399 PT W/DXA RESULTS DOCUMENT: HCPCS | Performed by: PSYCHIATRY & NEUROLOGY

## 2021-06-11 PROCEDURE — G8427 DOCREV CUR MEDS BY ELIG CLIN: HCPCS | Performed by: PSYCHIATRY & NEUROLOGY

## 2021-06-11 PROCEDURE — 1090F PRES/ABSN URINE INCON ASSESS: CPT | Performed by: PSYCHIATRY & NEUROLOGY

## 2021-06-11 PROCEDURE — 1123F ACP DISCUSS/DSCN MKR DOCD: CPT | Performed by: PSYCHIATRY & NEUROLOGY

## 2021-06-11 PROCEDURE — 3017F COLORECTAL CA SCREEN DOC REV: CPT | Performed by: PSYCHIATRY & NEUROLOGY

## 2021-06-11 PROCEDURE — 4040F PNEUMOC VAC/ADMIN/RCVD: CPT | Performed by: PSYCHIATRY & NEUROLOGY

## 2021-06-11 RX ORDER — CLOPIDOGREL BISULFATE 75 MG/1
TABLET ORAL
Qty: 90 TABLET | Refills: 1 | Status: SHIPPED | OUTPATIENT
Start: 2021-06-11 | End: 2021-12-17 | Stop reason: SDUPTHER

## 2021-06-11 ASSESSMENT — ENCOUNTER SYMPTOMS
PHOTOPHOBIA: 0
BACK PAIN: 0
SORE THROAT: 0
VOMITING: 0
WHEEZING: 0
BOWEL INCONTINENCE: 0
BLOOD IN STOOL: 0
EYE REDNESS: 0
SHORTNESS OF BREATH: 0
SWOLLEN GLANDS: 0
CONSTIPATION: 0
CHANGE IN BOWEL HABIT: 0
EYE PAIN: 0
EYE DISCHARGE: 0
SINUS PRESSURE: 0
NAUSEA: 0
VISUAL CHANGE: 0
EYE ITCHING: 0
CHEST TIGHTNESS: 0
APNEA: 0
ABDOMINAL DISTENTION: 0
VOICE CHANGE: 0
CHOKING: 0
COUGH: 0
TROUBLE SWALLOWING: 0
FACIAL SWELLING: 0
ABDOMINAL PAIN: 0
COLOR CHANGE: 0
DIARRHEA: 0

## 2021-06-11 NOTE — PROGRESS NOTES
Subjective:          Patient ID: Traci Elena is a 65 y.o. RIGHT   HANDED female. Cerebrovascular Accident  This is a chronic problem. Episode onset: TIA  IN  2008   AND  CVA  IN  2014. The problem occurs rarely. The problem has been resolved. Pertinent negatives include no abdominal pain, anorexia, arthralgias, change in bowel habit, chest pain, chills, congestion, coughing, diaphoresis, fatigue, fever, headaches, joint swelling, myalgias, nausea, neck pain, numbness, rash, sore throat, swollen glands, urinary symptoms, vertigo, visual change, vomiting or weakness. Nothing aggravates the symptoms. Treatments tried: ASA,  PLAVIX. The treatment provided significant relief. Dizziness  This is a chronic problem. Episode onset: MORE  THAN   2 YEARS. The problem occurs intermittently. The problem has been resolved. Pertinent negatives include no abdominal pain, anorexia, arthralgias, change in bowel habit, chest pain, chills, congestion, coughing, diaphoresis, fatigue, fever, headaches, joint swelling, myalgias, nausea, neck pain, numbness, rash, sore throat, swollen glands, urinary symptoms, vertigo, visual change, vomiting or weakness. Nothing aggravates the symptoms. She has tried nothing for the symptoms. The treatment provided significant relief. Neurologic Problem  The patient's pertinent negatives include no altered mental status, clumsiness, focal sensory loss, focal weakness, loss of balance, memory loss, near-syncope, slurred speech, syncope, visual change or weakness. Primary symptoms comment: MILD SHORT  TERM  MEMORY  PROBLEMS  . This is a chronic problem. The current episode started more than 1 year ago. The neurological problem developed insidiously. The problem is unchanged. There was no focality noted. Associated symptoms include dizziness.  Pertinent negatives include no abdominal pain, auditory change, aura, back pain, bladder incontinence, bowel incontinence, chest pain, confusion, diaphoresis, fatigue, fever, headaches, light-headedness, nausea, neck pain, palpitations, shortness of breath, vertigo or vomiting. Past treatments include nothing. The treatment provided no relief. Her past medical history is significant for a CVA. There is no history of a bleeding disorder, a clotting disorder, dementia, head trauma, liver disease, mood changes or seizures. History obtained from  The patient     and other  available medical records were  Also  reviewed. 1)     PREVIOUS     H/O      TIA    WITH  MEMORY  LOSS                      AND  CONFUSION     IN      2008                      -   RESOLVED              2)    PREVIOUS     H/O       STROKE    IN   January 2014. H/O   INFARCT  IN MEDIAL   TEMPORAL  LOBE                 PREVIOUS    H/O      CONFUSION  AND  MEMORY  LOSS                        FOR   5  HOURS  AT  THAT  TIME.                                -   RESOLVED               3)     ON    ASA   AND  PLAVIX    AND  TOLERATING  THE  SAME. NO  RECURRENCE   OF  TIA OR  CVA            4)        H/O     RESTLESS  LEG  SYNDROME                   FOR  MORE  THAN    30  YEARS             -      BETTER  CONTROLLED   ON  REQUIP            5)     NO   H/O  CLINICAL  SEIZURE  ACTIVITY. NO  RECURRENCE  OF  MEMORY  LOSS                     OR  CONFUSION. 6)       NO   EVIDENCE  OF  PARKINSONIAN  FEATURES           PATIENT  DENIES  ANY  GAIT  DIFFICULTIES                      OR  BALANCE  PROBLEMS          7)       MERALGIA   PARESTHETICA  ON   THE  LEFT  THIGH                       -   STABLE          8)      MULTIPLE   CO  MORBID  MEDICAL  CONDITIONS. BEING  FOLLOWED  BY  HER  PCP. 9)      MILD  SHORT TERM  MEMORY PROBLEMS                         --  FOR    2  YEARS                 PATIENT  NOT  CONCERNED.                   PATIENT  NOT  INTERESTED  IN  MEDICATION  FOR  DEMENTIA PATIENT  COULD BE  TRIED  WITH  MEDICATION,                 IF  PATIENT  IS  WILLING  FOR  THE  SAME. 10)         H/O    DIZZINESS  IN  THE  PAST                     -   NO  RECURRENCE            11)       CAROTID  DOPPLER,  TCD  IN  JAN. 2019      SHOWED                            NO  SIGNIFICANT  ABNORMALITIES                 12)      PATIENT  AWARE  OF  VARIOUS  RISK  FACTORS  FOR  TIA  /  STROKE                      REVIEWED,  DISCUSSED   WITH  PATIENT  IN  DETAIL. 13)       PATIENT  DENIES    ANY    RECURRENCE  OF  TIA /   STROKE                           OR  DIZZINESS. PATIENT  DENIES    ANY  NEW  NEUROLOGICAL   CONCERNS. The  Duration,  Quality,  Severity,  Location,  Timing,  Context,  Modifying  Factors   Of   The   Chief   Complaint       And  Present  Illness   Was   Reviewed   In   Chronological   Manner.              Patient   Indicates   The  Presence   And  The  Absence  Of  The  Following  Associated         And   Additional  Neurological    Symptoms:                                Balance  And coordination problems  absent           Gait problems     absent            Headaches      absent              Migraines           absent           Memory problems        PRESENT               Confusion        absent            Paresthesia numbness          absent           Seizures  And  Starring  Episodes           absent           Syncope,  Near  syncopal episodes         absent           Speech problems           absent             Swallowing  Problems      absent            Dizziness,  Light headedness           absent                        Vertigo        absent             Generalized   Weakness    absent              focal  Weakness     absent             Tremors         absent              Sleep  Problems     absent             History  Of   Recent   Head  Injury     absent             History  Of   Recent  TIA     absent History  Of   Recent    Stroke     absent             Neck  Pain and  Neck muscle  Spasms  Absent               Radiating  down   And   Weakness           absent            Lower back   Pain  And     Spasms  Absent              Radiating    Down   And   Weakness          absent                H/O   FALLS        absent               History  Of   Visual  Symptoms    Absent                  Associated   Diplopia       absent                                                                    Also   Additional   Symptoms   Present    As  Documented    In   The detailed      Review  Of  Systems   And    Please   Refer   To    Them for   Additional  Information. Any components  That are either  Unobtainable  Or  Limited  In   HPI, ROS  And/or PFSH       Are   Due   To   Patient's  Medical  Problems,  Clinical  Condition and/or lack of other  Alternate resources.             RECORDS   REVIEWED:    historical medical records, lab reports, office notes and radiology reports         INFORMATION   REVIEWED:     MEDICAL   HISTORY,     SURGICAL   HISTORY,   MEDICATIONS   LIST,   ALLERGIES AND  DRUG  INTOLERANCES,     FAMILY   HISTORY,  SOCIAL  HISTORY,    PROBLEM  LIST   FOR  PATIENT  CARE   COORDINATION           Past Medical History:   Diagnosis Date    Acute confusion     CVA (cerebral infarction)     Hyperlipidemia     Hypothyroidism     [address] [address]     Memory loss due to medical condition     Meralgia paresthetica     left side    Myopia with astigmatism and presbyopia     Perennial allergic rhinitis     RLS (restless legs syndrome)     TIA (transient ischemic attack) 2008, 1/06/2014    with brief confusion    VBI (vertebrobasilar insufficiency)                   Past Surgical History:   Procedure Laterality Date    C[facility]AC [facility]  9/2014    [facility]   nl cath    CATARACT REMOVAL Bilateral 2020    COLONOSCOPY  02/25/2015    mild diverticulosis: Kari BERNARD    INSERTABLE CARDIAC MONITOR  4/22/16    removed    INTRACAPSULAR CATARACT EXTRACTION Right 9/29/2020    Right Cataract Extraction w/ IOL Implant performed by Yue Austin MD at [address] Left 10/27/2020    Left Cataract Extraction w/ IOL Implant performed by Yue Austin MD at [address] [facility] / [address]      hemangioma removal on right foot 2nd toe    WISDOM TOOTH EXTRACTION  1981                 Current Outpatient Medications   Medication Sig Dispense Refill    clopidogrel (PLAVIX) 75 MG tablet take 1 tablet by mouth once daily 90 tablet 1    rOPINIRole (REQUIP) 1 MG tablet take 1 tablet by mouth at bedtime 90 tablet 1    levothyroxine (SYNTHROID) 125 MCG tablet take 1 tablet by mouth once daily 90 tablet 1    clotrimazole-betamethasone (LOTRISONE) 1-0.05 % cream Apply topically 2 times daily prn. 30 g 2    atorvastatin (LIPITOR) 10 MG tablet take 1 tablet by mouth once daily 30 tablet 12    calcium carbonate (OSCAL) 500 MG TABS tablet Take 500 mg by mouth daily      metoprolol tartrate (LOPRESSOR) 25 MG tablet Take 1 tablet by mouth 2 times daily (Patient taking differently: Take 12.5 mg by mouth 2 times daily ) 180 tablet 3    melatonin 5 MG TABS tablet Take 5 mg by mouth nightly      aspirin 81 MG tablet Take 81 mg by mouth daily.  Vitamin D (CHOLECALCIFEROL) 1000 UNITS CAPS capsule Take 3,000 Units by mouth daily       Multiple Vitamin (MULTI-VITAMINS PO) Take 1 tablet by mouth Daily.  albuterol sulfate HFA (PROVENTIL HFA) 108 (90 Base) MCG/ACT inhaler Inhale 2 puffs into the lungs every 6 hours as needed for Wheezing or Shortness of Breath (Patient not taking: Reported on 6/11/2021) 1 Inhaler 0    fluticasone (FLONASE) 50 MCG/ACT nasal spray 2 sprays to each nostril once daily.  (Patient not taking: Reported on 6/11/2021) 1 Bottle 11     No current facility-administered medications for this Status:    Intimate Partner Violence:     Fear of Current or Ex-Partner:     Emotionally Abused:     Physically Abused:     Sexually Abused:        Vitals:    06/11/21 1537   BP: 110/64   Pulse: 100   SpO2: 97%         Wt Readings from Last 3 Encounters:   06/11/21 189 lb 3.2 oz (85.8 kg)   02/03/21 189 lb 12.8 oz (86.1 kg)   01/11/21 189 lb (85.7 kg)         BP Readings from Last 3 Encounters:   06/11/21 110/64   02/03/21 110/78   01/11/21 124/61       Hematology and Coagulation  Lab Results   Component Value Date    WBC 8.2 02/11/2020    RBC 5.15 02/11/2020    HGB 14.1 02/11/2020    HCT 44.3 02/11/2020    MCV 86.0 02/11/2020    MCH 27.4 02/11/2020    MCHC 31.8 02/11/2020    RDW 12.5 02/11/2020     02/11/2020    MPV 9.8 02/11/2020       Chemistries  Lab Results   Component Value Date     01/27/2021    K 4.3 01/27/2021     01/27/2021    CO2 29 01/27/2021    BUN 18 01/27/2021    CREATININE 0.65 01/27/2021    CALCIUM 10.0 01/27/2021    PROT 7.4 01/27/2021    LABALBU 4.2 01/27/2021    BILITOT 0.96 01/27/2021    ALKPHOS 96 01/27/2021    AST 17 01/27/2021    ALT 13 01/27/2021     Lab Results   Component Value Date    ALKPHOS 96 01/27/2021    ALT 13 01/27/2021    AST 17 01/27/2021    PROT 7.4 01/27/2021    BILITOT 0.96 01/27/2021    LABALBU 4.2 01/27/2021     Lab Results   Component Value Date    BUN 18 01/27/2021    CREATININE 0.65 01/27/2021     Lab Results   Component Value Date    CALCIUM 10.0 01/27/2021    MG 2.1 01/06/2014     Lab Results   Component Value Date    AST 17 01/27/2021    ALT 13 01/27/2021       Lab Results   Component Value Date    CKTOTAL 59 11/08/2014                 Review of Systems   Constitutional: Negative for appetite change, chills, diaphoresis, fatigue, fever and unexpected weight change.    HENT: Negative for congestion, dental problem, drooling, ear discharge, ear pain, facial swelling, hearing loss, mouth sores, nosebleeds, postnasal drip, sinus pressure, sore throat, tinnitus, trouble swallowing and voice change. Eyes: Negative for photophobia, pain, discharge, redness, itching and visual disturbance. Respiratory: Negative for apnea, cough, choking, chest tightness, shortness of breath and wheezing. Cardiovascular: Negative for chest pain, palpitations, leg swelling and near-syncope. Gastrointestinal: Negative for abdominal distention, abdominal pain, anorexia, blood in stool, bowel incontinence, change in bowel habit, constipation, diarrhea, nausea and vomiting. Endocrine: Negative for cold intolerance, heat intolerance, polydipsia, polyphagia and polyuria. Genitourinary: Negative for bladder incontinence. Musculoskeletal: Negative for arthralgias, back pain, gait problem, joint swelling, myalgias, neck pain and neck stiffness. Skin: Negative for color change, pallor, rash and wound. Allergic/Immunologic: Negative for environmental allergies, food allergies and immunocompromised state. Neurological: Positive for dizziness. Negative for vertigo, tremors, focal weakness, seizures, syncope, facial asymmetry, speech difficulty, weakness, light-headedness, numbness, headaches and loss of balance. Hematological: Negative for adenopathy. Does not bruise/bleed easily. Psychiatric/Behavioral: Positive for decreased concentration. Negative for agitation, behavioral problems, confusion, dysphoric mood, hallucinations, memory loss, self-injury, sleep disturbance and suicidal ideas. The patient is not nervous/anxious and is not hyperactive. Objective:   Physical Exam  Constitutional:       Appearance: She is well-developed. HENT:      Head: Normocephalic and atraumatic. No raccoon eyes or Abernathy's sign. Right Ear: External ear normal.      Left Ear: External ear normal.      Nose: Nose normal.   Eyes:      Conjunctiva/sclera: Conjunctivae normal.      Pupils: Pupils are equal, round, and reactive to light.    Neck:      Thyroid: No thyroid mass or thyromegaly. Vascular: No carotid bruit. Trachea: No tracheal deviation. Meningeal: Brudzinski's sign and Kernig's sign absent. Cardiovascular:      Rate and Rhythm: Normal rate and regular rhythm. Pulmonary:      Effort: Pulmonary effort is normal.   Musculoskeletal:         General: No tenderness. Normal range of motion. Cervical back: Normal range of motion and neck supple. No rigidity. No muscular tenderness. Normal range of motion. Skin:     General: Skin is warm. Coloration: Skin is not pale. Findings: No erythema or rash. Nails: There is no clubbing. Psychiatric:         Attention and Perception: She is attentive. Mood and Affect: Mood is not anxious or depressed. Affect is not labile, blunt or inappropriate. Speech: She is communicative. Speech is not rapid and pressured, delayed, slurred or tangential.         Behavior: Behavior is not agitated, slowed, aggressive, withdrawn, hyperactive or combative. Behavior is cooperative. Thought Content: Thought content is not paranoid or delusional. Thought content does not include homicidal or suicidal ideation. Thought content does not include homicidal or suicidal plan. Cognition and Memory: Cognition is impaired. Memory is impaired. She does not exhibit impaired recent memory or impaired remote memory. Judgment: Judgment is not impulsive or inappropriate. NEUROLOGICAL EXAMINATION :    A) MENTAL STATUS:                   Alert and  oriented  To time, place  And  Person. No Aphasia. No  Dysarthria. Able   To  Follow     SIMPLE   commands without   Any  Difficulty. No right  To left confusion. Normal  Speech  And language function.                    Insight and  Judgment ,Fund  Of  Knowledge   within normal limits                Recent  And  Remote memory   DECREASED                  Attention &Concentration are    DECREASED                                                 B) CRANIAL NERVES :             2 CN : Visual  Acuity  And  Visual fields  within normal limits                        Fundi  Could  Not  Be  Could  Not  Be  Evaluated. 3,4,6 CN : Both  Pupils are   Reactive and  Equal.                            Extraocular   Movements  Are  Intact. No  Nystagmus. No  TESSA. No  Afferent  Pupillary  Defect noted. 5 CN :  Normal  Facial sensations and Corneal  Reflexes           7 CN :  Normal  Facial  Symmetry  And  Strength. No facial  Weakness. 8 CN :  Hearing  Appears subnormal          9, 10 CN: Normal spontaneous, reflex palate movements         11 CN:   Normal  Shoulder shrug and  Strength         12 CN :   Normal  Tongue movements and  Tongue  In midline                        No tongue   Fasciculations or atrophy         C) MOTOR  EXAM:                 Strength  In upper  And  Lower extremities   within normal limits                     Rapid alternating  And  repetitions  Movements  within normal limits               Muscle  Tone  In upper  And  Lower  Extremities  within normal limits                No rigidity. No  Spasticity. Bradykinesia   absent               No  Asterixis. Sustention  Tremor , Resting  Tremor   absent                    No other  Abnormal  Movements noted             D) SENSORY :               light touch, pinprick, position  And  Vibration  DECREASED        E) REFLEXES:                   Deep  Tendon  Reflexes within normal limits                    No pathological  Reflexes  Bilaterally.                                   F) COORDINATION  AND  GAIT :                                Station and  Gait  within normal limits                                        Romberg's test negative                          Ataxia negative        Assessment:           Patient Active Problem List Diagnosis    Hypothyroidism    Hyperlipidemia    Cerebral infarction ([address]    Memory loss due to medical condition    TIA (transient ischemic attack)    Meralgia paresthetica    Cataract    Myopia of both eyes with astigmatism and presbyopia    RLS (restless legs syndrome)    Myopia of both eyes    Lacunar infarction ([address]    CVA (cerebral vascular accident) ([address]    VBI (vertebrobasilar insufficiency)    JOHNATHAN due to [address] Mixed hyperlipidemia    Cerebrovascular accident (CVA) due to thrombosis of precerebral artery ([address]    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       The right internal carotid artery demonstrates the systolic velocities of 73,   80, 89 cm/sec in the proximal, mid and distal segments respectively.       The external carotid artery is patent.  The vertebral artery demonstrates   normal antegrade flow.       No evidence of focal atherosclerotic plaque.       ICA/CCA ratio of 1.0.       LEFT:       The left common carotid artery demonstrates peak systolic velocities of 92,   80 80 cm/sec in the proximal and distal segments respectively.       The left internal carotid artery demonstrates the systolic velocities of 63,   94, 90 cm/sec in the proximal, mid and distal segments respectively.       The external carotid artery is patent.  The vertebral artery demonstrates   normal antegrade flow.       No evidence of focal atherosclerotic plaque.       ICA/CCA ratio of 1.0.           Impression   The right internal carotid artery demonstrates 0-50% stenosis .       The left internal carotid artery demonstrates 0-50% stenosis .       Bilateral vertebral arteries are patent with flow in the normal direction.       There is no significant change from prior exam.  No significant plaque   formation is noted. Matias Yoakum is no evidence of hemodynamically significant   stenosis.               Bilateral carotid and vertebral arteries Doppler ultrasound is performed using grayscale, color Doppler and pulse-wave Doppler. Indication: 78-year-old female with a history of cerebral infarction, meralgia paresthetica, memory loss, hyperlipidemia, hypothyroidism, no vertebral basilar insufficiency and history of prior TIAs. Comparison: Prior carotid ultrasound from 2/16/09. Findings: Mild intimal thickening and slight mostly non-echogenic plaque identified. Left carotid artery:      1. The maximum peak systolic velocity in the proximal CCA is 92 cm/s, mid 66cm/s and distal 65cm/s. There is no diameter reduction. 2. The maximum peak systolic velocity in the proximal ICA 55cm/s, mid ICA 69 cm/s and distal ICA 114cm/s. There is no diameter reduction at the proximal ICA. 3. The maximum peak systolic velocity in the left external carotid artery is 110 cm/s. ICA/CCA =0.8. Right carotid artery:      1. The maximum peak systolic velocity in the proximal CCA is 93 cm/s, mid 64 cm/s and distal 65 cm/s. There is no diameter reduction at the right carotid bulb. 2. The maximum peak systolic velocity in the proximal [id number]/s, mid ICA 64 cm/s and distal ICA 68cm/s. There is no diameter reduction at the proximal ICA. 3. The maximum peak systolic velocity in the left external carotid artery is 64 cm/s. ICA/CCA= 1.2. There is evidence of bilateral antegrade flow in the vertebral arteries. Impression: No carotid artery stenosis or significant plaque disease. Stable examination compared to 2009. Final report electronically signed by Lee Esteban M.D. on 12/4/2015        . Plan:          VISITING DIAGNOSIS:      ICD-10-CM    1. H/O TIA (transient ischemic attack) and stroke  Z86.73    2. Cerebrovascular accident (CVA) due to thrombosis of precerebral artery ([facility] [address]  I63.00    3. Meralgia paresthetica, unspecified laterality  G57.10    4. Acquired hypothyroidism  E03.9    5. RLS (restless legs syndrome)  G25.81    6. Lacunar infarction ([facility] [address]  I63.81    7. Dizziness  R42    8. Late effects of CVA (cerebrovascular accident)  I69.90    9. Memory loss due to medical condition  R41.3    10. Mixed hyperlipidemia  E78.2    11. Cerebral infarction, unspecified mechanism (HCC)  I63.9    12. VBI (vertebrobasilar insufficiency)  G45.0    13. Meralgia paresthetica of left side  G57.12               CONCERNS   &   INCREASED   RISK   FOR         * TIA,  CEREBRO  VASCULAR  ISCHEMIA, STROKE       *   COGNITIVE  &   MEMORY PROBLEMS  AND  DEMENTIA                 VARIOUS  RISK   FACTORS   WERE  REVIEWED   AND   DISCUSSED. PLAN:       Kevin Eastman  Of  The  Diagnoses,  The  Management & Treatment  Options           AND    Care  plan  Were        Reviewed and   Discussed   With  patient. * Goals  And  Expectations  Of  The  Therapy  Discussed   And  Reviewed. *   Benefits   And   Side  Effect  Profile  Of  Medication/s   Were   Discussed             * Need   For  Further   Follow up For  The  Various  Problems  Were discussed. * Results  Of  The  Previous  Diagnostic tests were reviewed and questions answered. patient  understand the same. Medical  Decision  Making  Was  Made  Based on the   Complexity  Of  Above  Mentioned  Diagnoses,        Data reviewed   & diagnostic  Tests  Reviewed,  Risk  Of  Significant   Co morbidities and complicating   Factors.                  Medical  Decision  Was   High  Complexity  Due   To  The  Patient's  Multiple  Symptoms,  Advancing Disease,      Complex  Treatment  Regimen,  Multiple medications and   Risk  Of   Side  Effects,  Difficulty  In  Medication  Management      And  Diagnostic  Challenges   In  View  Of  The  Associated   Co  Morbid  Conditions   And  Problems. * FALL   PRECAUTIONS. *   BE  CAREFUL  WITH  ACTIVITIES         *   ADEQUATE   FLUID  INTAKE   AND  ELECTROLYTE  BALANCE         * KEEP  DAIRY  OF   THE  NEUROLOGICAL  SYMPTOMS        RECORDING THE    DURATION  AND  FREQUENCY. *  AVOID    CONDITIONS  AND  FACTORS   THAT  MAKE   NEUROLOGICAL  SYMPTOMS  WORSE. *  TO  MAINTAIN  REGULAR  SLEEP  WAKE  CYCLES. *   TO  HAVE  ADEQUATE  REST  AND   SLEEP    HOURS.          *    TO   AVOID   TO  SLEEP  IN   SUPINE  POSITION. *      WEIGHT   LOSS. *    AVOID  ANY USAGE OF                   TOBACCO,  EXCESSIVE  ALCOHOL  AND   ILLEGAL   SUBSTANCES          *  CONTINUE MEDICATIONS PRESCRIBED BY NEUROLOGIST AS    RECOMMENDED     *   Compliance   With  Medications   And  Instructions      * CURRENTLY  TOLERATING  THE  PRESCRIBED   MEDICATIONS. WITHOUT  ANY  SIGNIFICANT  SIDE  EFFECTS   &  GETTING BENEFIT. *  MILD  SHORT TERM  MEMORY PROBLEMS    FOR    2  YEARS              PATIENT  NOT  CONCERNED. * PATIENT  AWARE  OF  VARIOUS  RISK  FACTORS  FOR  TIA  /  STROKE                    SAME  REVIEWED,  DISCUSSED   WITH  PATIENT  IN  DETAIL.                 *  May   Use  Pill  Box,    If  Needed      *    To  Continue  The    Antiplatelet  therapy    As   Recommended  Was   Discussed              *    Prophylactic  Use   Of     Vitamin   B   Complex,  Folic  Acid,    Vitamin  B12    Multivitamin       Tablet  Daily    Supplementations   Over  The  Counter  Discussed           *  PATIENT  IS  ALSO   COUNSELED   TO  KEEP    ACTIVITIES         A)   SIMPLE      B)  ORGANIZED      C)  WRITE   DOWN                    Orders Placed This Encounter   Medications    clopidogrel (PLAVIX) 75 MG tablet     Sig: take 1 tablet by mouth once daily     Dispense:  90 tablet     Refill:  1                   *PATIENT   TO  FOLLOW  UP  WITH   PRIMARY  CARE   AND   OTHER  CONSULTANTS  AS  BEFORE. *  Maintain   Healthy  Life Style    With   Periodic  Monitoring  Of      Any  Medical  Conditions  Including   Elevated  Blood  Pressure,  Lipid  Profile,     Blood  Sugar levels  And   Heart  Disease. *   Period   Screening  For  Cancers  Involving  Breast,  Colon,     lungs  And  Other  Organs  As  Applicable,  In consultation   With  Your  Primary Care Providers. * Second  Neurological  Opinion  And  Evaluations  In  [facility] AND [facility]  Setting  If  Patient  Is  Interested. *  If  The  Patient remains  Neurologically  Stable   Return   To  [facility] Neurology Department       IN     6    MONTHS  TIME   FOR  FURTHER  FOLLOW UP.                 *  If   There is  Any  Significant  Worsening   Of  Current  Symptoms  And  Or  If patient  Develops       Any additional  New  Neurological  Symptoms  Or  Significant  Concerns   Should  Call  911 or      Go  To  Emergency  Department  For  Further  Immediate  Evaluation. *   The  Neurological   Findings,  Possible  Diagnosis,  Differential diagnoses   And  Options      For    Further   Investigations   And  management   Are  Discussed  Comprehensively. Medications   And  Prescription   Risks  And  Side effects  Are   Also  Discussed. The  Above  Were  Reviewed  With  patient and     questions  Answered  In  Detail. More   Than   50% of face  To face Time   Was  Spent  On  Counseling   And   Coordination  Of  Care       Of   Patient's multiple   Neurological  Problems   And   Comorbid  Medical   Conditions.           Electronically signed by David Hi MD.,  [facility]        Board Certified in  Neurology &  In  [address] Medicine   American Board of Psychiatry and Neurology (ABPN)      DISCLAIMER:   Although every effort was made to ensure the accuracy of this  electronic transcription, some errors in transcription may have occurred. GENERAL PATIENT INSTRUCTIONS:     A Healthy Lifestyle: Care Instructions  Your Care Instructions  A healthy lifestyle can help you feel good, stay at a healthy weight, and have plenty of energy for both work and play. A healthy lifestyle is something you can share with your whole family. A healthy lifestyle also can lower your risk for serious health problems, such as high blood pressure, heart disease, and diabetes. You can follow a few steps listed below to improve your health and the health of your family. Follow-up care is a key part of your treatment and safety. Be sure to make and go to all appointments, and call your doctor if you are having problems. Its also a good idea to know your test results and keep a list of the medicines you take. How can you care for yourself at home? Do not eat too much sugar, fat, or fast foods. You can still have dessert and treats now and then. The goal is moderation. Start small to improve your eating habits. Pay attention to portion sizes, drink less juice and soda pop, and eat more fruits and vegetables. Eat a healthy amount of food. A 3-ounce serving of meat, for example, is about the size of a deck of cards. Fill the rest of your plate with vegetables and whole grains. Limit the amount of soda and sports drinks you have every day. Drink more water when you are thirsty. Eat at least 5 servings of fruits and vegetables every day. It may seem like a lot, but it is not hard to reach this goal. A serving or helping is 1 piece of fruit, 1 cup of vegetables, or 2 cups of leafy, raw vegetables. Have an apple or some carrot sticks as an afternoon snack instead of a candy bar.  Try to have fruits and/or vegetables at every meal.  Make exercise part of your daily routine. You may want to start with simple activities, such as walking, bicycling, or slow swimming. Try to be active 30 to 60 minutes every day. You do not need to do all 30 to 60 minutes all at once. For example, you can exercise 3 times a day for 10 or 20 minutes. Moderate exercise is safe for most people, but it is always a good idea to talk to your doctor before starting an exercise program.  Keep moving. Indira Never the lawn, work in the garden, or Get[employer]ids. Take the stairs instead of the elevator at work. If you smoke, quit. People who smoke have an increased risk for heart attack, stroke, cancer, and other lung illnesses. Quitting is hard, but there are ways to boost your chance of quitting tobacco for good. Use nicotine gum, patches, or lozenges. Ask your doctor about stop-smoking programs and medicines. Keep trying. In addition to reducing your risk of diseases in the future, you will notice some benefits soon after you stop using tobacco. If you have shortness of breath or asthma symptoms, they will likely get better within a few weeks after you quit. Limit how much alcohol you drink. Moderate amounts of alcohol (up to 2 drinks a day for men, 1 drink a day for women) are okay. But drinking too much can lead to liver problems, high blood pressure, and other health problems. Family health  If you have a family, there are many things you can do together to improve your health. Eat meals together as a family as often as possible. Eat healthy foods. This includes fruits, vegetables, lean meats and dairy, and whole grains. Include your family in your fitness plan. Most people think of activities such as jogging or tennis as the way to fitness, but there are many ways you and your family can be more active. Anything that makes you breathe hard and gets your heart pumping is exercise. Here are some tips:  Walk to do errands or to take your child to school or the bus.   Go for a family bike ride after dinner instead of watching TV. Where can you learn more? Go to [email or web address].[email or web address] org and sign in to your [employer] account. Enter C268 in the [employer] box to learn more about \"A Healthy Lifestyle: Care Instructions. \"     If you do not have an account, please click on the \"Sign Up Now\" link. Current as of: July 26, 2016  Content Version: 11.2  © 9045-3708 [employer]. Care instructions adapted under license by [facility] ([facility]). If you have questions about a medical condition or this instruction, always ask your healthcare professional. [address] any warranty or liability for your use of this information.

## 2021-10-27 ENCOUNTER — OFFICE VISIT (OUTPATIENT)
Dept: FAMILY MEDICINE CLINIC | Age: 71
End: 2021-10-27
Payer: MEDICARE

## 2021-10-27 VITALS
BODY MASS INDEX: 35.8 KG/M2 | SYSTOLIC BLOOD PRESSURE: 118 MMHG | WEIGHT: 189.6 LBS | RESPIRATION RATE: 16 BRPM | HEIGHT: 61 IN | DIASTOLIC BLOOD PRESSURE: 72 MMHG | HEART RATE: 78 BPM

## 2021-10-27 DIAGNOSIS — Z12.31 ENCOUNTER FOR SCREENING MAMMOGRAM FOR BREAST CANCER: ICD-10-CM

## 2021-10-27 DIAGNOSIS — E03.9 ACQUIRED HYPOTHYROIDISM: Primary | ICD-10-CM

## 2021-10-27 DIAGNOSIS — E78.2 MIXED HYPERLIPIDEMIA: ICD-10-CM

## 2021-10-27 DIAGNOSIS — Z86.73 HISTORY OF CVA (CEREBROVASCULAR ACCIDENT): ICD-10-CM

## 2021-10-27 DIAGNOSIS — R73.01 IFG (IMPAIRED FASTING GLUCOSE): ICD-10-CM

## 2021-10-27 PROCEDURE — 1036F TOBACCO NON-USER: CPT | Performed by: FAMILY MEDICINE

## 2021-10-27 PROCEDURE — G8427 DOCREV CUR MEDS BY ELIG CLIN: HCPCS | Performed by: FAMILY MEDICINE

## 2021-10-27 PROCEDURE — G8484 FLU IMMUNIZE NO ADMIN: HCPCS | Performed by: FAMILY MEDICINE

## 2021-10-27 PROCEDURE — G8399 PT W/DXA RESULTS DOCUMENT: HCPCS | Performed by: FAMILY MEDICINE

## 2021-10-27 PROCEDURE — 3017F COLORECTAL CA SCREEN DOC REV: CPT | Performed by: FAMILY MEDICINE

## 2021-10-27 PROCEDURE — 4040F PNEUMOC VAC/ADMIN/RCVD: CPT | Performed by: FAMILY MEDICINE

## 2021-10-27 PROCEDURE — 99212 OFFICE O/P EST SF 10 MIN: CPT

## 2021-10-27 PROCEDURE — 99214 OFFICE O/P EST MOD 30 MIN: CPT | Performed by: FAMILY MEDICINE

## 2021-10-27 PROCEDURE — 1090F PRES/ABSN URINE INCON ASSESS: CPT | Performed by: FAMILY MEDICINE

## 2021-10-27 PROCEDURE — G8417 CALC BMI ABV UP PARAM F/U: HCPCS | Performed by: FAMILY MEDICINE

## 2021-10-27 PROCEDURE — 1123F ACP DISCUSS/DSCN MKR DOCD: CPT | Performed by: FAMILY MEDICINE

## 2021-10-27 RX ORDER — LEVOTHYROXINE SODIUM 0.12 MG/1
TABLET ORAL
Qty: 90 TABLET | Refills: 1 | Status: SHIPPED | OUTPATIENT
Start: 2021-10-27 | End: 2022-04-20

## 2021-10-27 ASSESSMENT — PATIENT HEALTH QUESTIONNAIRE - PHQ9
SUM OF ALL RESPONSES TO PHQ QUESTIONS 1-9: 0
SUM OF ALL RESPONSES TO PHQ9 QUESTIONS 1 & 2: 0
1. LITTLE INTEREST OR PLEASURE IN DOING THINGS: 0
SUM OF ALL RESPONSES TO PHQ QUESTIONS 1-9: 0
2. FEELING DOWN, DEPRESSED OR HOPELESS: 0
SUM OF ALL RESPONSES TO PHQ QUESTIONS 1-9: 0

## 2021-10-27 NOTE — PROGRESS NOTES
[address], [address] [place] [address] Drive                        Telephone [phone]             Fax [phone]  1950  MRN:  [id number]  Date of visit:  10/27/2021      Assessment and Plan:    1. Acquired hypothyroidism  TSH and free T4 were within normal limits on 4/2/2021:  Lab Results   Component Value Date    TSH 2.12 04/02/2021    THYROXINE 1.51 04/02/2021      She was advised to continue with her current dose of Levothyroxine. Levothyroxine was refilled:   - levothyroxine (SYNTHROID) 125 MCG tablet; take 1 tablet by mouth once daily  Dispense: 90 tablet; Refill: 1    2. Mixed hyperlipidemia  She has had no recent labs. Labs were ordered to be done when she returns fasting:  - Comprehensive Metabolic Panel; Future  - Lipid, Fasting; Future    She will be contacted when the results are available. She is tolerating Lipitor well. She states that she does not need a refill today. 3. IFG (impaired fasting glucose)  She has had no recent labs. - Hemoglobin A1C; Future was ordered to be done when she returns fasting. She will be contacted when the results are available. 4. History of CVA (cerebrovascular accident)  She has no residual deficits. 5.  Routine health maintenance  Health maintenance was reviewed with the patient. She has received two doses of the Moderna Covid-19 vaccine. She was advised that she is eligible for a third dose of the Covid-19 vaccine 6 months after the second dose. She has received an influenza vaccine this influenza season. Screening mammogram was recommended and ordered. All other health maintenance, including Shingrix, Tdap, Pneumovax, and Prevnar-13, is up to date at this time.        Subjective:    Kimberly Spivey is a 66 y.o. female who presents to [facility] today (10/27/2021) for follow up/evaluation of:  Establish Care and Nail Problem      She is here today to establish with a new physician. She previously had seen Sarthak Mota. She has a history of a cerebrovascular accident in 2014, and a transient ischemic attack in 2008. She has no residual deficits. She is tolerating her medications well. She has had no recent labs. She has received two doses of the Moderna Covid-19 vaccine. Her second dose was 3/26/2021. She has the following problem list:  Patient Active Problem List   Diagnosis    Hypothyroidism    Hyperlipidemia    Cerebral infarction ([address]    Memory loss due to medical condition    TIA (transient ischemic attack)    Meralgia paresthetica    Cataract    Myopia of both eyes with astigmatism and presbyopia    RLS (restless legs syndrome)    Myopia of both eyes    Lacunar infarction ([address]    CVA (cerebral vascular accident) ([address]    VBI (vertebrobasilar insufficiency)    JOHNATHAN due to [address] Mixed hyperlipidemia    Cerebrovascular accident (CVA) due to thrombosis of precerebral artery ([address]    Cerebral infarction due to thrombosis of vertebral artery ([address]    Late effects of CVA (cerebrovascular accident)    Other hyperlipidemia    Dizziness        Current medications are:  Outpatient Medications Marked as Taking for the 10/27/21 encounter (Office Visit) with Vamsi Ahumada MD   Medication Sig Dispense Refill    metoprolol tartrate (LOPRESSOR) 25 MG tablet take 1 tablet by mouth twice a day 180 tablet 3    clopidogrel (PLAVIX) 75 MG tablet take 1 tablet by mouth once daily 90 tablet 1    rOPINIRole (REQUIP) 1 MG tablet take 1 tablet by mouth at bedtime 90 tablet 1    levothyroxine (SYNTHROID) 125 MCG tablet take 1 tablet by mouth once daily 90 tablet 1    clotrimazole-betamethasone (LOTRISONE) 1-0.05 % cream Apply topically 2 times daily prn.  30 g 2    atorvastatin (LIPITOR) 10 MG tablet take 1 tablet by mouth once daily 30 tablet 12    calcium carbonate (OSCAL) 500 MG TABS tablet Take 500 mg by mouth daily      albuterol sulfate HFA (PROVENTIL HFA) 108 (90 Base) MCG/ACT inhaler Inhale 2 puffs into the lungs every 6 hours as needed for Wheezing or Shortness of Breath 1 Inhaler 0    fluticasone (FLONASE) 50 MCG/ACT nasal spray 2 sprays to each nostril once daily. 1 Bottle 11    aspirin 81 MG tablet Take 81 mg by mouth daily.  Vitamin D (CHOLECALCIFEROL) 1000 UNITS CAPS capsule Take 3,000 Units by mouth daily       Multiple Vitamin (MULTI-VITAMINS PO) Take 1 tablet by mouth Daily. She is allergic to sulfamethoxazole-trimethoprim and penicillins. She  reports that she has never smoked. She has never used smokeless tobacco.      Objective:    Vitals:    10/27/21 1121   BP: 118/72   Site: Right Upper Arm   Position: Sitting   Cuff Size: Large Adult   Pulse: 78   Resp: 16   Weight: 189 lb 9.6 oz (86 kg)   Height: 5' 1\" (1.549 m)     Body mass index is 35.82 kg/m². Obese female, healthy-appearing, alert, cooperative and in no acute distress. Neck supple. No adenopathy. Thyroid symmetric, normal size. Chest:  Normal expansion. Clear to auscultation. No rales, rhonchi, or wheezing. Heart sounds are normal.  Regular rate and rhythm without murmur, gallop or rub. Lower extremities have no edema. Results of labs done 4/2/2021 were reviewed with the patient:   No visits with results within 1 Month(s) from this visit.    Latest known visit with results is:   Hospital Outpatient Visit on 04/02/2021   Component Date Value Ref Range Status    TSH 04/02/2021 2.12  0.30 - 5.00 mIU/L Final    Thyroxine, Free 04/02/2021 1.51  0.93 - 1.70 ng/dL Final             (Please note that portions of this note were completed with a voice-recognition program. Efforts were made to edit the dictation but occasionally words are mis-transcribed.)

## 2021-10-29 ENCOUNTER — HOSPITAL ENCOUNTER (OUTPATIENT)
Dept: LAB | Age: 71
Discharge: HOME | End: 2021-10-29
Payer: MEDICARE

## 2021-10-29 DIAGNOSIS — E78.2 MIXED HYPERLIPIDEMIA: ICD-10-CM

## 2021-10-29 DIAGNOSIS — R73.01 IFG (IMPAIRED FASTING GLUCOSE): ICD-10-CM

## 2021-10-29 LAB
ALBUMIN SERPL-MCNC: 4.1 G/DL (ref 3.5–5.2)
ALBUMIN/GLOBULIN RATIO: 1.2 (ref 1–2.5)
ALP BLD-CCNC: 104 U/L (ref 35–104)
ALT SERPL-CCNC: 20 U/L (ref 5–33)
ANION GAP SERPL CALCULATED.3IONS-SCNC: 7 MMOL/L (ref 9–17)
AST SERPL-CCNC: 21 U/L
BILIRUB SERPL-MCNC: 1.2 MG/DL (ref 0.3–1.2)
BUN BLDV-MCNC: 17 MG/DL (ref 8–23)
BUN/CREAT BLD: 25 (ref 9–20)
CALCIUM SERPL-MCNC: 9.9 MG/DL (ref 8.6–10.4)
CHLORIDE BLD-SCNC: 106 MMOL/L (ref 98–107)
CHOLESTEROL, FASTING: 170 MG/DL
CHOLESTEROL/HDL RATIO: 3.5
CO2: 28 MMOL/L (ref 20–31)
CREAT SERPL-MCNC: 0.69 MG/DL (ref 0.5–0.9)
ESTIMATED AVERAGE GLUCOSE: 120 MG/DL
GFR AFRICAN AMERICAN: >60 ML/MIN
GFR NON-AFRICAN AMERICAN: >60 ML/MIN
GFR SERPL CREATININE-BSD FRML MDRD: ABNORMAL ML/MIN/{1.73_M2}
GFR SERPL CREATININE-BSD FRML MDRD: ABNORMAL ML/MIN/{1.73_M2}
GLUCOSE BLD-MCNC: 109 MG/DL (ref 70–99)
HBA1C MFR BLD: 5.8 % (ref 4–6)
HDLC SERPL-MCNC: 48 MG/DL
LDL CHOLESTEROL: 90 MG/DL (ref 0–130)
POTASSIUM SERPL-SCNC: 4.3 MMOL/L (ref 3.7–5.3)
SODIUM BLD-SCNC: 141 MMOL/L (ref 135–144)
TOTAL PROTEIN: 7.6 G/DL (ref 6.4–8.3)
TRIGLYCERIDE, FASTING: 158 MG/DL
VLDLC SERPL CALC-MCNC: ABNORMAL MG/DL (ref 1–30)

## 2021-10-29 PROCEDURE — 36415 COLL VENOUS BLD VENIPUNCTURE: CPT

## 2021-10-29 PROCEDURE — 80053 COMPREHEN METABOLIC PANEL: CPT

## 2021-10-29 PROCEDURE — 80061 LIPID PANEL: CPT

## 2021-10-29 PROCEDURE — 83036 HEMOGLOBIN GLYCOSYLATED A1C: CPT

## 2021-10-30 ENCOUNTER — HOSPITAL ENCOUNTER (OUTPATIENT)
Dept: MAMMOGRAPHY | Age: 71
Discharge: HOME | End: 2021-11-01
Payer: MEDICARE

## 2021-10-30 DIAGNOSIS — Z12.31 ENCOUNTER FOR SCREENING MAMMOGRAM FOR BREAST CANCER: ICD-10-CM

## 2021-10-30 PROCEDURE — 77067 SCR MAMMO BI INCL CAD: CPT

## 2021-10-30 PROCEDURE — 77063 BREAST TOMOSYNTHESIS BI: CPT

## 2021-11-01 ENCOUNTER — TELEPHONE (OUTPATIENT)
Dept: FAMILY MEDICINE CLINIC | Age: 71
End: 2021-11-01

## 2021-11-01 DIAGNOSIS — E78.2 MIXED HYPERLIPIDEMIA: Primary | ICD-10-CM

## 2021-11-01 DIAGNOSIS — R73.01 ELEVATED FASTING GLUCOSE: ICD-10-CM

## 2021-11-08 DIAGNOSIS — G25.81 RLS (RESTLESS LEGS SYNDROME): ICD-10-CM

## 2021-11-08 RX ORDER — ROPINIROLE 1 MG/1
TABLET, FILM COATED ORAL
Qty: 90 TABLET | Refills: 1 | Status: SHIPPED | OUTPATIENT
Start: 2021-11-08 | End: 2022-06-02 | Stop reason: SDUPTHER

## 2021-11-08 NOTE — TELEPHONE ENCOUNTER
Last Appt:  6/11/2021  Next Appt:   12/17/2021  Med verified in [facility]    Patient needs refill on Ropinirole.     [address] [facility]

## 2021-11-10 ENCOUNTER — VIRTUAL VISIT (OUTPATIENT)
Dept: FAMILY MEDICINE CLINIC | Age: 71
End: 2021-11-10
Payer: MEDICARE

## 2021-11-10 DIAGNOSIS — Z00.00 ROUTINE GENERAL MEDICAL EXAMINATION AT A HEALTH CARE FACILITY: Primary | ICD-10-CM

## 2021-11-10 PROCEDURE — 4040F PNEUMOC VAC/ADMIN/RCVD: CPT | Performed by: FAMILY MEDICINE

## 2021-11-10 PROCEDURE — 3017F COLORECTAL CA SCREEN DOC REV: CPT | Performed by: FAMILY MEDICINE

## 2021-11-10 PROCEDURE — G0439 PPPS, SUBSEQ VISIT: HCPCS | Performed by: FAMILY MEDICINE

## 2021-11-10 PROCEDURE — 1123F ACP DISCUSS/DSCN MKR DOCD: CPT | Performed by: FAMILY MEDICINE

## 2021-11-10 PROCEDURE — G8484 FLU IMMUNIZE NO ADMIN: HCPCS | Performed by: FAMILY MEDICINE

## 2021-11-10 SDOH — ECONOMIC STABILITY: FOOD INSECURITY: WITHIN THE PAST 12 MONTHS, YOU WORRIED THAT YOUR FOOD WOULD RUN OUT BEFORE YOU GOT MONEY TO BUY MORE.: PATIENT DECLINED

## 2021-11-10 SDOH — ECONOMIC STABILITY: FOOD INSECURITY: WITHIN THE PAST 12 MONTHS, THE FOOD YOU BOUGHT JUST DIDN'T LAST AND YOU DIDN'T HAVE MONEY TO GET MORE.: PATIENT DECLINED

## 2021-11-10 ASSESSMENT — SOCIAL DETERMINANTS OF HEALTH (SDOH): HOW HARD IS IT FOR YOU TO PAY FOR THE VERY BASICS LIKE FOOD, HOUSING, MEDICAL CARE, AND HEATING?: PATIENT DECLINED

## 2021-11-10 ASSESSMENT — PATIENT HEALTH QUESTIONNAIRE - PHQ9
1. LITTLE INTEREST OR PLEASURE IN DOING THINGS: 0
SUM OF ALL RESPONSES TO PHQ QUESTIONS 1-9: 0
SUM OF ALL RESPONSES TO PHQ QUESTIONS 1-9: 0
SUM OF ALL RESPONSES TO PHQ9 QUESTIONS 1 & 2: 0
2. FEELING DOWN, DEPRESSED OR HOPELESS: 0
SUM OF ALL RESPONSES TO PHQ QUESTIONS 1-9: 0

## 2021-11-10 ASSESSMENT — LIFESTYLE VARIABLES: HOW OFTEN DO YOU HAVE A DRINK CONTAINING ALCOHOL: 0

## 2021-11-10 NOTE — PATIENT INSTRUCTIONS
Personalized Preventive Plan for Duane Hinojosaw - 11/10/2021  Medicare offers a range of preventive health benefits. Some of the tests and screenings are paid in full while other may be subject to a deductible, co-insurance, and/or copay. Some of these benefits include a comprehensive review of your medical history including lifestyle, illnesses that may run in your family, and various assessments and screenings as appropriate. After reviewing your medical record and screening and assessments performed today your provider may have ordered immunizations, labs, imaging, and/or referrals for you. A list of these orders (if applicable) as well as your Preventive Care list are included within your After Visit Summary for your review. Other Preventive Recommendations:    · A preventive eye exam performed by an eye specialist is recommended every 1-2 years to screen for glaucoma; cataracts, macular degeneration, and other eye disorders. · A preventive dental visit is recommended every 6 months. · Try to get at least 150 minutes of exercise per week or 10,000 steps per day on a pedometer . · Order or download the FREE \"Exercise & Physical Activity: Your Everyday Guide\" from [employer]. Call 7-995.235.9220 or search [employer] online. · You need 7830-6719 mg of calcium and 1887-9859 IU of vitamin D per day. It is possible to meet your calcium requirement with diet alone, but a vitamin D supplement is usually necessary to meet this goal.  · When exposed to the sun, use a sunscreen that protects against both UVA and UVB radiation with an SPF of 30 or greater. Reapply every 2 to 3 hours or after sweating, drying off with a towel, or swimming. · Always wear a seat belt when traveling in a car. Always wear a helmet when riding a bicycle or motorcycle.

## 2021-11-10 NOTE — PROGRESS NOTES
Medicare Annual Wellness Visit  Name: Alberto Hernández Date: 11/10/2021   MRN: [id number] Sex: Female   Age: 66 y.o. Ethnicity: Non- / Non    : 1950 Race: White (non-)      Amalia Ram is here for Medicare AWV (awv lpn)    Screenings for behavioral, psychosocial and functional/safety risks, and cognitive dysfunction are all negative except as indicated below. These results, as well as other patient data from the [address] form, are documented in Flowsheets linked to this Encounter. Allergies   Allergen Reactions    Sulfamethoxazole-Trimethoprim Swelling     lips    Penicillins Rash       Prior to Visit Medications    Medication Sig Taking? Authorizing Provider   rOPINIRole (REQUIP) 1 MG tablet take 1 tablet by mouth at bedtime Yes Mayra Ornelas MD   levothyroxine (SYNTHROID) 125 MCG tablet take 1 tablet by mouth once daily Yes Rui Hall MD   metoprolol tartrate (LOPRESSOR) 25 MG tablet take 1 tablet by mouth twice a day Yes Vladimir Louis MD   clopidogrel (PLAVIX) 75 MG tablet take 1 tablet by mouth once daily Yes Mayra Ornelas MD   clotrimazole-betamethasone (LOTRISONE) 1-0.05 % cream Apply topically 2 times daily prn. Yes ARCENIO Mathur   atorvastatin (LIPITOR) 10 MG tablet take 1 tablet by mouth once daily Yes Kemal Red DO   calcium carbonate (OSCAL) 500 MG TABS tablet Take 500 mg by mouth daily Yes Historical Provider, MD   albuterol sulfate HFA (PROVENTIL HFA) 108 (90 Base) MCG/ACT inhaler Inhale 2 puffs into the lungs every 6 hours as needed for Wheezing or Shortness of Breath Yes NORMAN Rogers CNP   fluticasone (FLONASE) 50 MCG/ACT nasal spray 2 sprays to each nostril once daily. Yes NORMAN Vásquez CNP   aspirin 81 MG tablet Take 81 mg by mouth daily.  Yes Historical Provider, MD   Vitamin D (CHOLECALCIFEROL) 1000 UNITS CAPS capsule Take 3,000 Units by mouth daily  Yes Historical Provider, MD Multiple Vitamin (MULTI-VITAMINS PO) Take 1 tablet by mouth Daily.  Yes Historical Provider, MD       Past Medical History:   Diagnosis Date    Acute confusion     CVA (cerebral infarction)     Hyperlipidemia     Hypothyroidism     Lacunar infarction ([address]     Memory loss due to medical condition     Meralgia paresthetica     left side    Myopia with astigmatism and presbyopia     Perennial allergic rhinitis     RLS (restless legs syndrome)     TIA (transient ischemic attack) , 2014    with brief confusion    VBI (vertebrobasilar insufficiency)        Past Surgical History:   Procedure Laterality Date    CARD[facility]AC [facility]ATION  [facility]    [facility]   nl cath    CATARACT REMOVAL Bilateral     COLONOSCOPY  2015    mild diverticulosis:  Kari BERNARD    INSERTABLE CARDIAC MONITOR  16    removed    INTRACAPSULAR CATARACT EXTRACTION Right 2020    Right Cataract Extraction w/ IOL Implant performed by Seema Voss MD at [address] Left 10/27/2020    Left Cataract Extraction w/ IOL Implant performed by Seema Voss MD at [facility] / [address]      hemangioma removal on right foot 2nd toe    WISDOM TOOTH EXTRACTION         Family History   Problem Relation Age of Onset    Stroke Mother     Other Mother          from pneumonia    Liver Disease Father         due to alcohol    Heart Disease Father         valve disease    Diabetes Father     Thyroid Disease Sister     Cancer Sister         breast cancer older sister    Breast Cancer Sister     Diabetes Brother     Breast Cancer Maternal Grandmother     High Cholesterol Sister     Diabetes Brother     Other Paternal Aunt         late onset alzheimers    Glaucoma Neg Hx     Cataracts Neg Hx        CareTeam (Including outside providers/suppliers regularly involved in providing care):   Patient Care Team:  Gita Dean MD Jorge as PCP - General (Family Medicine)  Tita Newberry MD as PCP - [facility] Empaneled Provider    Wt Readings from Last 3 Encounters:   10/27/21 189 lb 9.6 oz (86 kg)   06/11/21 189 lb 3.2 oz (85.8 kg)   02/03/21 189 lb 12.8 oz (86.1 kg)     Patient-Reported Vitals 11/10/2021   Patient-Reported Weight 188lbs   Patient-Reported Height 5 1        Based upon direct observation of the patient, evaluation of cognition reveals recent and remote memory intact. Patient's complete Health Risk Assessment and screening values have been reviewed and are found in Flowsheets. The following problems were reviewed today and where indicated follow up appointments were made and/or referrals ordered. Positive Risk Factor Screenings with Interventions:          General Health and ACP:  General  In general, how would you say your health is?: Very Good  In the past 7 days, have you experienced any of the following?  New or Increased Pain, New or Increased Fatigue, Loneliness, Social Isolation, Stress or Anger?: None of These  Do you get the social and emotional support that you need?: Yes  Do you have a Living Will?: Yes  Advance Directives     Power of [address] [place] [address] Will ACP-Advance Directive ACP-Power of     Not on File Not on File Not on File Not on File      General Health Risk Interventions:  · Patient has no concerns at this time    Health Habits/Nutrition:  Health Habits/Nutrition  Do you exercise for at least 20 minutes 2-3 times per week?: Yes  Have you lost any weight without trying in the past 3 months?: No  Do you eat only one meal per day?: No  Have you seen the dentist within the past year?: Yes     Health Habits/Nutrition Interventions:  · Patient has no concerns at this time       Personalized Preventive Plan   Current Health Maintenance Status  Immunization History   Administered Date(s) Administered    COVID-19, Xiang Santiago, Primary or Immunocompromised, PF, 100mcg/0.5mL 02/26/2021, 03/26/2021    Hepatitis B 11/18/1997, 12/19/1998, 06/04/1999    Influenza A (Q5D0-18) Vaccine PF IM 02/11/2010    Influenza Vaccine, unspecified formulation 01/21/2016    Influenza, High Dose (Fluzone 65 yrs and older) 01/21/2016, 11/14/2017, 11/06/2018, 09/27/2019    Influenza, Quadv, IM, PF (6 mo and older Fluzone, Flulaval, Fluarix, and 3 yrs and older Afluria) 01/23/2017    Influenza, Quadv, adjuvanted, 65 yrs +, IM, PF (Fluad) 09/29/2021    Pneumococcal Conjugate 13-valent (Poylqrt70) 01/02/2015    Pneumococcal Polysaccharide ([id number]) 08/30/2013    Tdap (Boostrix, Adacel) 12/21/2009, 01/28/2019    Zoster Live (Zostavax) 08/16/2012    Zoster Recombinant (Shingrix) 05/01/2019, 09/27/2019        Health Maintenance   Topic Date Due    Annual Wellness Visit (AWV)  08/04/2021    COVID-19 Vaccine (3 - Booster for Moderna series) 09/26/2021    TSH testing  04/02/2022    A1C test (Diabetic or Prediabetic)  10/29/2022    Lipid screen  10/29/2022    Breast cancer screen  10/30/2022    Colon cancer screen colonoscopy  02/25/2025    DTaP/Tdap/Td vaccine (3 - Td or Tdap) 01/28/2029    DEXA (modify frequency per FRAX score)  Completed    Flu vaccine  Completed    Shingles Vaccine  Completed    Pneumococcal 65+ years Vaccine  Completed    Hepatitis C screen  Completed    Hepatitis A vaccine  Aged Out    Hepatitis B vaccine  Aged Out    Hib vaccine  Aged Out    Meningococcal (ACWY) vaccine  Aged Out     Recommendations for documistic Due: see orders and patient instructions/AVS.  . Recommended screening schedule for the next 5-10 years is provided to the patient in written form: see Patient Instructions/AVS.    Nito Monaco, was evaluated through a synchronous (real-time) audio encounter. The patient (or guardian if applicable) is aware that this is a billable service. Verbal consent to proceed has been obtained within the past 12 months.  The visit was conducted pursuant to the emergency declaration under the

## 2021-11-29 RX ORDER — ATORVASTATIN CALCIUM 10 MG/1
TABLET, FILM COATED ORAL
Qty: 90 TABLET | Refills: 2 | Status: SHIPPED | OUTPATIENT
Start: 2021-11-29 | End: 2022-08-22

## 2021-12-07 ENCOUNTER — OFFICE VISIT (OUTPATIENT)
Dept: OPTOMETRY | Age: 71
End: 2021-12-07
Payer: MEDICARE

## 2021-12-07 DIAGNOSIS — H52.203 HYPEROPIA OF BOTH EYES WITH ASTIGMATISM AND PRESBYOPIA: ICD-10-CM

## 2021-12-07 DIAGNOSIS — H52.4 HYPEROPIA OF BOTH EYES WITH ASTIGMATISM AND PRESBYOPIA: ICD-10-CM

## 2021-12-07 DIAGNOSIS — H43.813 VITREOUS DEGENERATION OF BOTH EYES: Primary | ICD-10-CM

## 2021-12-07 DIAGNOSIS — H52.03 HYPEROPIA OF BOTH EYES WITH ASTIGMATISM AND PRESBYOPIA: ICD-10-CM

## 2021-12-07 DIAGNOSIS — Z96.1 PSEUDOPHAKIA OF BOTH EYES: ICD-10-CM

## 2021-12-07 PROCEDURE — 1036F TOBACCO NON-USER: CPT | Performed by: OPTOMETRIST

## 2021-12-07 PROCEDURE — 1090F PRES/ABSN URINE INCON ASSESS: CPT | Performed by: OPTOMETRIST

## 2021-12-07 PROCEDURE — 99213 OFFICE O/P EST LOW 20 MIN: CPT | Performed by: OPTOMETRIST

## 2021-12-07 PROCEDURE — G8427 DOCREV CUR MEDS BY ELIG CLIN: HCPCS | Performed by: OPTOMETRIST

## 2021-12-07 PROCEDURE — 1123F ACP DISCUSS/DSCN MKR DOCD: CPT | Performed by: OPTOMETRIST

## 2021-12-07 PROCEDURE — 4040F PNEUMOC VAC/ADMIN/RCVD: CPT | Performed by: OPTOMETRIST

## 2021-12-07 PROCEDURE — G8484 FLU IMMUNIZE NO ADMIN: HCPCS | Performed by: OPTOMETRIST

## 2021-12-07 PROCEDURE — 99211 OFF/OP EST MAY X REQ PHY/QHP: CPT

## 2021-12-07 PROCEDURE — 3017F COLORECTAL CA SCREEN DOC REV: CPT | Performed by: OPTOMETRIST

## 2021-12-07 PROCEDURE — G8417 CALC BMI ABV UP PARAM F/U: HCPCS | Performed by: OPTOMETRIST

## 2021-12-07 PROCEDURE — G8399 PT W/DXA RESULTS DOCUMENT: HCPCS | Performed by: OPTOMETRIST

## 2021-12-07 ASSESSMENT — KERATOMETRY
OD_K2POWER_DIOPTERS: 44.75
OD_AXISANGLE_DEGREES: 134
OS_K2POWER_DIOPTERS: 44.25
OD_K1POWER_DIOPTERS: 44.50
OD_AXISANGLE2_DEGREES: 044
OS_AXISANGLE_DEGREES: 163
METHOD_AUTO_MANUAL: AUTOMATED
OS_K1POWER_DIOPTERS: 43.50
OS_AXISANGLE2_DEGREES: 073

## 2021-12-07 ASSESSMENT — REFRACTION_WEARINGRX
OD_SPHERE: PLANO
SPECS_TYPE: BIFOCAL
OS_SPHERE: -0.50
OD_CYLINDER: -0.50
OS_CYLINDER: DS
OD_ADD: +2.50
OD_AXIS: 050
OS_ADD: +2.50

## 2021-12-07 ASSESSMENT — VISUAL ACUITY
OD_CC: 20/20
METHOD: SNELLEN - LINEAR
OS_CC: 20/20
CORRECTION_TYPE: GLASSES

## 2021-12-07 ASSESSMENT — ENCOUNTER SYMPTOMS
GASTROINTESTINAL NEGATIVE: 0
RESPIRATORY NEGATIVE: 0
ALLERGIC/IMMUNOLOGIC NEGATIVE: 0
EYES NEGATIVE: 0

## 2021-12-07 ASSESSMENT — REFRACTION_MANIFEST
OD_CYLINDER: -0.50
OD_SPHERE: PLANO
OD_AXIS: 055
OS_ADD: +2.50
OD_ADD: +2.50
OS_AXIS: 086
OS_SPHERE: +0.25
OS_CYLINDER: -0.75

## 2021-12-07 ASSESSMENT — TONOMETRY
OS_IOP_MMHG: 20
IOP_METHOD: NON-CONTACT AIR PUFF
OD_IOP_MMHG: 20

## 2021-12-07 ASSESSMENT — SLIT LAMP EXAM - LIDS
COMMENTS: NORMAL
COMMENTS: NORMAL

## 2021-12-07 NOTE — PROGRESS NOTES
Aly Shore presents today for   Chief Complaint   Patient presents with    Blurred Vision   . HPI     Blurred Vision     Laterality: both eyes    Context: near vision              Comments     Last Vision Exam: 20  Last Ophthalmology Exam: 2020 cat sx [facility]   Last Filled Glasses Rx: 20  Insurance: Medicare   Update: check up, update glasses. Distance vision seems great still, but using glasses for reading or up close work. Current Outpatient Medications   Medication Sig Dispense Refill    atorvastatin (LIPITOR) 10 MG tablet take 1 tablet by mouth once daily 90 tablet 2    rOPINIRole (REQUIP) 1 MG tablet take 1 tablet by mouth at bedtime 90 tablet 1    levothyroxine (SYNTHROID) 125 MCG tablet take 1 tablet by mouth once daily 90 tablet 1    metoprolol tartrate (LOPRESSOR) 25 MG tablet take 1 tablet by mouth twice a day 180 tablet 3    clopidogrel (PLAVIX) 75 MG tablet take 1 tablet by mouth once daily 90 tablet 1    clotrimazole-betamethasone (LOTRISONE) 1-0.05 % cream Apply topically 2 times daily prn. 30 g 2    calcium carbonate (OSCAL) 500 MG TABS tablet Take 500 mg by mouth daily      albuterol sulfate HFA (PROVENTIL HFA) 108 (90 Base) MCG/ACT inhaler Inhale 2 puffs into the lungs every 6 hours as needed for Wheezing or Shortness of Breath 1 Inhaler 0    fluticasone (FLONASE) 50 MCG/ACT nasal spray 2 sprays to each nostril once daily. 1 Bottle 11    aspirin 81 MG tablet Take 81 mg by mouth daily.  Vitamin D (CHOLECALCIFEROL) 1000 UNITS CAPS capsule Take 3,000 Units by mouth daily       Multiple Vitamin (MULTI-VITAMINS PO) Take 1 tablet by mouth Daily. No current facility-administered medications for this visit.          Family History   Problem Relation Age of Onset    Stroke Mother     Other Mother          from pneumonia    Liver Disease Father         due to alcohol    Heart Disease Father         valve disease    Diabetes Father    Laurence Hiram Thyroid Disease Sister     Cancer Sister         breast cancer older sister   [place] Breast Cancer Sister     Diabetes Brother     Breast Cancer Maternal Grandmother     High Cholesterol Sister     Diabetes Brother     Other Paternal Aunt         late onset alzheimers    Glaucoma Neg Hx     Cataracts Neg Hx      Social History     Socioeconomic History    Marital status:      Spouse name: None    Number of children: None    Years of education: None    Highest education level: None   Occupational History    Occupation: retired    Tobacco Use    Smoking status: Never Smoker    Smokeless tobacco: Never Used   Vaping Use    Vaping Use: Never used   Substance and Sexual Activity    Alcohol use: No    Drug use: No    Sexual activity: Not Currently     Partners: Male   Other Topics Concern    None   Social History Narrative    None     Social Determinants of Health     Financial Resource Strain: Unknown    Difficulty of Paying Living Expenses: Patient refused   Food Insecurity: Unknown    Worried About Running Out of Food in the Last Year: Patient refused    [address] in the Last Year: Patient refused   Transportation Needs:     Lack of Transportation (Medical): Not on file    Lack of Transportation (Non-Medical):  Not on file   Physical Activity:     Days of Exercise per Week: Not on file    Minutes of Exercise per Session: Not on file   Stress:     Feeling of Stress : Not on file   Social Connections:     Frequency of Communication with Friends and Family: Not on file    Frequency of Social Gatherings with Friends and Family: Not on file    Attends [employer] Services: Not on file    Active Member of Clubs or Organizations: Not on file    Attends Club or Organization Meetings: Not on file    Marital Status: Not on file   Intimate Partner Violence:     Fear of Current or Ex-Partner: Not on file    Emotionally Abused: Not on file    Physically Abused: Not on file   [place] Sexually Abused: Not on file   Housing Stability:     Unable to Pay for Housing in the Last Year: Not on file    Number of Places Lived in the Last Year: Not on file    Unstable Housing in the Last Year: Not on file     Past Medical History:   Diagnosis Date    Acute confusion     CVA (cerebral infarction)     Hyperlipidemia     Hypothyroidism     Lacunar infarction ([facility] [address]     Memory loss due to medical condition     Meralgia paresthetica     left side    Myopia with astigmatism and presbyopia     Perennial allergic rhinitis     RLS (restless legs syndrome)     TIA (transient ischemic attack) 2008, 1/06/2014    with brief confusion    VBI (vertebrobasilar insufficiency)        ROS     Negative for: Constitutional, Gastrointestinal, Neurological, Skin, Genitourinary, Musculoskeletal, HENT, Endocrine, Cardiovascular, Eyes, Respiratory, Psychiatric, Allergic/Imm, Heme/Lymph          Main Ophthalmology Exam     External Exam       Right Left    External Normal Normal          Slit Lamp Exam       Right Left    Lids/Lashes Normal Normal    Conjunctiva/Sclera White and quiet White and quiet    Cornea Clear Clear    Anterior Chamber Deep and quiet Deep and quiet    Iris Round and reactive Round and reactive    Lens Posterior chamber intraocular lens Posterior chamber intraocular lens    Vitreous Normal vitreous degeneration           Fundus Exam       Right Left    Disc Normal Normal    C/D Ratio 0.25 0.25    Macula Normal Normal    Vessels Normal Normal    Periphery Normal Normal             <div id=\"MAIN_EXAM_REVIEWED\"></div>      Tonometry     Tonometry (Non-contact air puff, 10:56 AM)       Right Left    Pressure 20 20      Right / Left  IOPg 18.7 / 18.4  CH 7.4 / 8.3  WS 6.7 / 6.5                   Not recorded       Not recorded         Visual Acuity (Snellen - Linear)       Right Left    Dist cc 20/20 20/20    Near cc 20/20     Correction: Glasses          Pupils     Pupils       Pupils    Right PERRL    Left PERRL              Neuro/Psych     Neuro/Psych     Oriented x3: Yes    Mood/Affect: Normal              Keratometry     Keratometry (Automated)       K1 Axis K2 Axis    Right 44.50 044 44.75 134    Left 43.50 073 44.25 163                  Ophthalmology Exam     Wearing Rx       Sphere Cylinder Axis Add    Right [place] -0.50 050 +2.50    Left -0.50 ds  +2.50    Type: Bifocal              Manifest Refraction     Manifest Refraction       Sphere Cylinder Axis Dist VA Add    Right [place] -0.50 055 20/20 +2.50    Left +0.25 -0.75 086 20/20 +2.50          Manifest Refraction #2 (Auto)       Sphere Cylinder Axis Dist VA Add    Right +0.25 -0.50 069      Left +0.75 -1.25 086                 Final Rx       Sphere Cylinder Axis Add    Right [place] -0.50 055 +2.50    Left +0.25 -0.75 086 +2.50    Type: Bifocal    Expiration Date: 12/8/2023            No orders of the defined types were placed in this encounter. IMPRESSION:  1. Vitreous degeneration of both eyes    2. Hyperopia of both eyes with astigmatism and presbyopia    3. Pseudophakia of both eyes        PLAN:    1. The patient was counseled on signs and symptoms of retinal tear/detachment/vitreous hemorrhage. Strict return precautions were given for new onset of many floaters, flashing of lights in the peripheral vision, or a curtain or veil over the vision. The patient verbalized understanding. All questions were answered. 2. New glasses if desired;    3. Excellent surgery       There are no Patient Instructions on file for this visit. No follow-ups on file.

## 2021-12-17 ENCOUNTER — OFFICE VISIT (OUTPATIENT)
Dept: NEUROLOGY | Age: 71
End: 2021-12-17
Payer: MEDICARE

## 2021-12-17 VITALS
WEIGHT: 194.4 LBS | BODY MASS INDEX: 36.7 KG/M2 | OXYGEN SATURATION: 98 % | DIASTOLIC BLOOD PRESSURE: 80 MMHG | HEART RATE: 76 BPM | HEIGHT: 61 IN | SYSTOLIC BLOOD PRESSURE: 122 MMHG

## 2021-12-17 DIAGNOSIS — I69.90 LATE EFFECTS OF CVA (CEREBROVASCULAR ACCIDENT): Primary | ICD-10-CM

## 2021-12-17 DIAGNOSIS — G45.0 VBI (VERTEBROBASILAR INSUFFICIENCY): ICD-10-CM

## 2021-12-17 DIAGNOSIS — Z86.73 H/O TIA (TRANSIENT ISCHEMIC ATTACK) AND STROKE: ICD-10-CM

## 2021-12-17 DIAGNOSIS — R42 DIZZINESS: ICD-10-CM

## 2021-12-17 DIAGNOSIS — G57.10 MERALGIA PARESTHETICA, UNSPECIFIED LATERALITY: ICD-10-CM

## 2021-12-17 DIAGNOSIS — G25.81 RLS (RESTLESS LEGS SYNDROME): ICD-10-CM

## 2021-12-17 DIAGNOSIS — I63.00 CEREBRAL INFARCTION DUE TO THROMBOSIS OF PRECEREBRAL ARTERY (HCC): ICD-10-CM

## 2021-12-17 DIAGNOSIS — I63.81 LACUNAR INFARCTION (HCC): ICD-10-CM

## 2021-12-17 DIAGNOSIS — E03.9 ACQUIRED HYPOTHYROIDISM: ICD-10-CM

## 2021-12-17 PROCEDURE — 4040F PNEUMOC VAC/ADMIN/RCVD: CPT | Performed by: PSYCHIATRY & NEUROLOGY

## 2021-12-17 PROCEDURE — G8417 CALC BMI ABV UP PARAM F/U: HCPCS | Performed by: PSYCHIATRY & NEUROLOGY

## 2021-12-17 PROCEDURE — 1090F PRES/ABSN URINE INCON ASSESS: CPT | Performed by: PSYCHIATRY & NEUROLOGY

## 2021-12-17 PROCEDURE — G8399 PT W/DXA RESULTS DOCUMENT: HCPCS | Performed by: PSYCHIATRY & NEUROLOGY

## 2021-12-17 PROCEDURE — 1123F ACP DISCUSS/DSCN MKR DOCD: CPT | Performed by: PSYCHIATRY & NEUROLOGY

## 2021-12-17 PROCEDURE — 3017F COLORECTAL CA SCREEN DOC REV: CPT | Performed by: PSYCHIATRY & NEUROLOGY

## 2021-12-17 PROCEDURE — 99214 OFFICE O/P EST MOD 30 MIN: CPT | Performed by: PSYCHIATRY & NEUROLOGY

## 2021-12-17 PROCEDURE — 1036F TOBACCO NON-USER: CPT | Performed by: PSYCHIATRY & NEUROLOGY

## 2021-12-17 PROCEDURE — 99213 OFFICE O/P EST LOW 20 MIN: CPT | Performed by: PSYCHIATRY & NEUROLOGY

## 2021-12-17 PROCEDURE — G8427 DOCREV CUR MEDS BY ELIG CLIN: HCPCS | Performed by: PSYCHIATRY & NEUROLOGY

## 2021-12-17 PROCEDURE — G8484 FLU IMMUNIZE NO ADMIN: HCPCS | Performed by: PSYCHIATRY & NEUROLOGY

## 2021-12-17 RX ORDER — CLOPIDOGREL BISULFATE 75 MG/1
TABLET ORAL
Qty: 90 TABLET | Refills: 1 | Status: SHIPPED | OUTPATIENT
Start: 2021-12-17 | End: 2022-06-07

## 2021-12-17 ASSESSMENT — ENCOUNTER SYMPTOMS
SINUS PRESSURE: 0
EYE DISCHARGE: 0
VISUAL CHANGE: 0
EYE PAIN: 0
PHOTOPHOBIA: 0
ABDOMINAL DISTENTION: 0
CONSTIPATION: 0
DIARRHEA: 0
COLOR CHANGE: 0
BLOOD IN STOOL: 0
BOWEL INCONTINENCE: 0
NAUSEA: 0
SHORTNESS OF BREATH: 0
VOICE CHANGE: 0
CHOKING: 0
APNEA: 0
VOMITING: 0
BACK PAIN: 0
SORE THROAT: 0
ABDOMINAL PAIN: 0
SWOLLEN GLANDS: 0
COUGH: 0
CHEST TIGHTNESS: 0
CHANGE IN BOWEL HABIT: 0
FACIAL SWELLING: 0
EYE ITCHING: 0
WHEEZING: 0
EYE REDNESS: 0
TROUBLE SWALLOWING: 0

## 2021-12-17 NOTE — PATIENT INSTRUCTIONS
*   ADEQUATE   FLUID  INTAKE   AND  ELECTROLYTE  BALANCE             * KEEP  DAIRY  OF   THE  NEUROLOGICAL  SYMPTOMS          *  TO  MAINTAIN  REGULAR  SLEEP  WAKE  CYCLES. *   TO  HAVE  ADEQUATE  REST  AND   SLEEP    HOURS.          *    AVOID  USAGE OF   TOBACCO,  EXCESSIVE  ALCOHOL                AND   ILLEGAL   SUBSTANCES,  IF  ANY          *  Maintain   Healthy  Life Style    With   Periodic  Monitoring  Of         Any  Medical  Conditions  Including   Elevated  Blood  Pressure,  Lipid  Profile,       Blood  Sugar levels  And   Heart  Disease. *   Period   Screening  For  Cancers  Involving  Breast,  Colon,         Lungs  And  Other  Organs  As  Applicable,           In consultation   With  Your  Primary Care Providers. *  If   There is  Any  Significant  Worsening   Of  Current  Symptoms  And             Or  If    Any additional  New  Neurological  Symptoms  and          Significant  Concerns   Should  Call  911 or  Go  To  Emergency  Department            For  Further  Immediate  Evaluation.

## 2021-12-17 NOTE — PROGRESS NOTES
Subjective:          Patient ID: Wolfgang Theodore is a 66 y.o. RIGHT   HANDED female. Cerebrovascular Accident  This is a chronic problem. Episode onset: TIA  IN  2008   AND  CVA  IN  2014. The problem occurs rarely. The problem has been resolved. Pertinent negatives include no abdominal pain, anorexia, arthralgias, change in bowel habit, chest pain, chills, congestion, coughing, diaphoresis, fatigue, fever, headaches, joint swelling, myalgias, nausea, neck pain, numbness, rash, sore throat, swollen glands, urinary symptoms, vertigo, visual change, vomiting or weakness. Nothing aggravates the symptoms. Treatments tried: ASA,  PLAVIX. The treatment provided significant relief. Dizziness  This is a chronic problem. Episode onset: MORE  THAN   2 YEARS. The problem occurs intermittently. The problem has been resolved. Pertinent negatives include no abdominal pain, anorexia, arthralgias, change in bowel habit, chest pain, chills, congestion, coughing, diaphoresis, fatigue, fever, headaches, joint swelling, myalgias, nausea, neck pain, numbness, rash, sore throat, swollen glands, urinary symptoms, vertigo, visual change, vomiting or weakness. Nothing aggravates the symptoms. She has tried nothing for the symptoms. The treatment provided significant relief. Neurologic Problem  The patient's pertinent negatives include no altered mental status, clumsiness, focal sensory loss, focal weakness, loss of balance, memory loss, near-syncope, slurred speech, syncope, visual change or weakness. Primary symptoms comment: MILD SHORT  TERM  MEMORY  PROBLEMS  . This is a chronic problem. The current episode started more than 1 year ago. The neurological problem developed insidiously. The problem is unchanged. There was no focality noted. Associated symptoms include dizziness.  Pertinent negatives include no abdominal pain, auditory change, aura, back pain, bladder incontinence, bowel incontinence, chest pain, confusion, diaphoresis, fatigue, fever, headaches, light-headedness, nausea, neck pain, palpitations, shortness of breath, vertigo or vomiting. Past treatments include nothing. The treatment provided no relief. Her past medical history is significant for a CVA. There is no history of a bleeding disorder, a clotting disorder, dementia, head trauma, liver disease, mood changes or seizures. History obtained from  The patient     and other  available medical records were  Also  reviewed. 1)     PREVIOUS     H/O      TIA    WITH  MEMORY  LOSS                      AND  CONFUSION     IN      2008                      -   RESOLVED              2)    PREVIOUS     H/O       STROKE    IN   January 2014. H/O   INFARCT  IN MEDIAL   TEMPORAL  LOBE                 PREVIOUS    H/O      CONFUSION  AND  MEMORY  LOSS                        FOR   5  HOURS  AT  THAT  TIME.                                -   RESOLVED               3)     ON    ASA   AND  PLAVIX    AND  TOLERATING  THE  SAME. NO  RECURRENCE   OF  TIA OR  CVA            4)        H/O     RESTLESS  LEG  SYNDROME                   FOR  MORE  THAN    30  YEARS             -      BETTER  CONTROLLED   ON  REQUIP               5)     NO   H/O  CLINICAL  SEIZURE  ACTIVITY. NO  RECURRENCE  OF  MEMORY  LOSS                     OR  CONFUSION. 6)       NO   EVIDENCE  OF  PARKINSONIAN  FEATURES                    PATIENT  DENIES  ANY  GAIT  DIFFICULTIES                      OR  BALANCE  PROBLEMS          7)           MERALGIA   PARESTHETICA  ON   THE  LEFT  THIGH                       -   STABLE          8)      MULTIPLE   CO  MORBID  MEDICAL  CONDITIONS. BEING  FOLLOWED  BY  HER  PCP. 9)      MILD  SHORT TERM  MEMORY PROBLEMS                         --  FOR    2  YEARS                 PATIENT  NOT  CONCERNED.                   PATIENT  NOT  INTERESTED  IN  MEDICATION  FOR DEMENTIA                  PATIENT  COULD BE  TRIED  WITH  MEDICATION,                 IF  PATIENT  IS  WILLING  FOR  THE  SAME. 10)         PREVIOUS     H/O    DIZZINESS                    -   NO  RECURRENCE            11)       CAROTID  DOPPLER,  TCD  IN  JAN. 2019      SHOWED                            NO  SIGNIFICANT  ABNORMALITIES                 12)      PATIENT  AWARE  OF  VARIOUS  RISK  FACTORS  FOR  TIA  /  STROKE                      REVIEWED,  DISCUSSED   WITH  PATIENT  IN  DETAIL. 13)       PATIENT  DENIES    ANY    RECURRENCE  OF  TIA /   STROKE                           OR  DIZZINESS. PATIENT  DENIES    ANY  NEW  NEUROLOGICAL   CONCERNS. The  Duration,  Quality,  Severity,  Location,  Timing,  Context,  Modifying  Factors   Of   The   Chief   Complaint       And  Present  Illness   Was   Reviewed   In   Chronological   [facility].              Patient   Indicates   The  Presence   And  The  Absence  Of  The  Following  Associated         And   Additional  Neurological    Symptoms:                                Balance  And coordination problems  absent           Gait problems     absent            Headaches      absent              Migraines           absent           Memory problems        PRESENT               Confusion        absent            Paresthesia numbness          absent           Seizures  And  Starring  Episodes           absent           Syncope,  Near  syncopal episodes         absent           Speech problems           absent             Swallowing  Problems      absent            Dizziness,  Light headedness           absent                        Vertigo        absent             Generalized   Weakness    absent              focal  Weakness     absent             Tremors         absent              Sleep  Problems     absent             History  Of   Recent   Head  Injury     absent             History  Of   Recent  TIA absent             History  Of   Recent    Stroke     absent             Neck  Pain and  Neck muscle  Spasms  Absent               Radiating  down   And   Weakness           absent            Lower back   Pain  And     Spasms  Absent              Radiating    Down   And   Weakness          absent                H/O   FALLS        absent               History  Of   Visual  Symptoms    Absent                  Associated   Diplopia       absent                                                                    Also   Additional   Symptoms   Present    As  Documented    In   The detailed      Review  Of  Systems   And    Please   Refer   To    Them for   Additional  Information. Any components  That are either  Unobtainable  Or  Limited  In   HPI, ROS  And/or PFSH       Are   Due   To   Patient's  Medical  Problems,  Clinical  Condition and/or lack of other  Alternate resources.             RECORDS   REVIEWED:    historical medical records, lab reports, office notes and radiology reports         INFORMATION   REVIEWED:     MEDICAL   HISTORY,     SURGICAL   HISTORY,   MEDICATIONS   LIST,   ALLERGIES AND  DRUG  INTOLERANCES,     FAMILY   HISTORY,  SOCIAL  HISTORY,    PROBLEM  LIST   FOR  PATIENT  CARE   COORDINATION           Past Medical History:   Diagnosis Date    Acute confusion     CVA (cerebral infarction)     Hyperlipidemia     Hypothyroidism     Lac[facility]ar infarction ([facility] [address]     Memory loss due to medical condition     Meralgia paresthetica     left side    Myopia with astigmatism and presbyopia     Perennial allergic rhinitis     RLS (restless legs syndrome)     TIA (transient ischemic attack) 2008, 1/06/2014    with brief confusion    VBI (vertebrobasilar insufficiency)                   Past Surgical History:   Procedure Laterality Date    CARD[facility]AC [facility]  9/[facility]   nl cath    CATARACT REMOVAL Bilateral 2020    COLONOSCOPY  02/25/2015    mild diverticulosis:  Kari BERNARD    INSERTABLE CARDIAC MONITOR  4/22/16    removed    INTRACAPSULAR CATARACT EXTRACTION Right 9/29/2020    Right Cataract Extraction w/ IOL Implant performed by Wilner De Guzman MD at [place] [id number] Left 10/27/2020    Left Cataract Extraction w/ IOL Implant performed by Wilner De Guzman MD at [address] [place] [address] PRE-[facility] / [address]      hemangioma removal on right foot 2nd toe    WISDOM TOOTH EXTRACTION  1981                 Current Outpatient Medications   Medication Sig Dispense Refill    clopidogrel (PLAVIX) 75 MG tablet take 1 tablet by mouth once daily 90 tablet 1    atorvastatin (LIPITOR) 10 MG tablet take 1 tablet by mouth once daily 90 tablet 2    rOPINIRole (REQUIP) 1 MG tablet take 1 tablet by mouth at bedtime 90 tablet 1    levothyroxine (SYNTHROID) 125 MCG tablet take 1 tablet by mouth once daily 90 tablet 1    metoprolol tartrate (LOPRESSOR) 25 MG tablet take 1 tablet by mouth twice a day 180 tablet 3    clotrimazole-betamethasone (LOTRISONE) 1-0.05 % cream Apply topically 2 times daily prn. 30 g 2    calcium carbonate (OSCAL) 500 MG TABS tablet Take 500 mg by mouth daily      albuterol sulfate HFA (PROVENTIL HFA) 108 (90 Base) MCG/ACT inhaler Inhale 2 puffs into the lungs every 6 hours as needed for Wheezing or Shortness of Breath 1 Inhaler 0    fluticasone (FLONASE) 50 MCG/ACT nasal spray 2 sprays to each nostril once daily. 1 Bottle 11    aspirin 81 MG tablet Take 81 mg by mouth daily.  Vitamin D (CHOLECALCIFEROL) 1000 UNITS CAPS capsule Take 3,000 Units by mouth daily       Multiple Vitamin (MULTI-VITAMINS PO) Take 1 tablet by mouth Daily. No current facility-administered medications for this visit.              Allergies   Allergen Reactions    Sulfamethoxazole-Trimethoprim Swelling     lips    Penicillins Rash               Family History   Problem Relation Age of Onset    Stroke Mother  Other Mother          from pneumonia    Liver Disease Father         due to alcohol    Heart Disease Father         valve disease    Diabetes Father     Thyroid Disease Sister     Cancer Sister         breast cancer older sister   Hannah Cherry Breast Cancer Sister     Diabetes Brother     Breast Cancer Maternal Grandmother     High Cholesterol Sister     Diabetes Brother     Other Paternal Aunt         late onset alzheimers    Glaucoma Neg Hx     Cataracts Neg Hx              Social History     Socioeconomic History    Marital status:      Spouse name: Not on file    Number of children: Not on file    Years of education: Not on file    Highest education level: Not on file   Occupational History    Occupation: retired    Tobacco Use    Smoking status: Never Smoker    Smokeless tobacco: Never Used   Vaping Use    Vaping Use: Never used   Substance and Sexual Activity    Alcohol use: No    Drug use: No    Sexual activity: Not Currently     Partners: Male   Other Topics Concern    Not on file   Social History Narrative    Not on file     Social Determinants of Health     Financial Resource Strain: Unknown    Difficulty of Paying Living Expenses: Patient refused   Food Insecurity: Unknown    Worried About [facility] in the Last Year: Patient refused    Ran Out of Food in the Last Year: Patient refused   Transportation Needs:     Lack of Transportation (Medical): Not on file    Lack of Transportation (Non-Medical):  Not on file   Physical Activity:     Days of Exercise per Week: Not on file    Minutes of Exercise per Session: Not on file   Stress:     Feeling of Stress : Not on file   Social Connections:     Frequency of Communication with Friends and Family: Not on file    Frequency of Social Gatherings with Friends and Family: Not on file    Attends [employer] Services: Not on file    Active Member of Clubs or Organizations: Not on file    Attends Club or Organization Meetings: Not on file    Marital Status: Not on file   Intimate Partner Violence:     Fear of Current or Ex-Partner: Not on file    Emotionally Abused: Not on file    Physically Abused: Not on file    Sexually Abused: Not on file   Housing Stability:     Unable to Pay for Housing in the Last Year: Not on file    Number of Places Lived in the Last Year: Not on file    Unstable Housing in the Last Year: Not on file       Vitals:    12/17/21 1553   BP: 122/80   Pulse: 76   SpO2: 98%         Wt Readings from Last 3 Encounters:   12/17/21 194 lb 6.4 oz (88.2 kg)   10/27/21 189 lb 9.6 oz (86 kg)   06/11/21 189 lb 3.2 oz (85.8 kg)         BP Readings from Last 3 Encounters:   12/17/21 122/80   10/27/21 118/72   06/11/21 110/64       Hematology and Coagulation  Lab Results   Component Value Date    WBC 8.2 02/11/2020    RBC 5.15 02/11/2020    HGB 14.1 02/11/2020    HCT 44.3 02/11/2020    MCV 86.0 02/11/2020    MCH 27.4 02/11/2020    MCHC 31.8 02/11/2020    RDW 12.5 02/11/2020     02/11/2020    MPV 9.8 02/11/2020       Chemistries  Lab Results   Component Value Date     10/29/2021    K 4.3 10/29/2021     10/29/2021    CO2 28 10/29/2021    BUN 17 10/29/2021    CREATININE 0.69 10/29/2021    CALCIUM 9.9 10/29/2021    PROT 7.6 10/29/2021    LABALBU 4.1 10/29/2021    BILITOT 1.20 10/29/2021    ALKPHOS 104 10/29/2021    AST 21 10/29/2021    ALT 20 10/29/2021     Lab Results   Component Value Date    ALKPHOS 104 10/29/2021    ALT 20 10/29/2021    AST 21 10/29/2021    PROT 7.6 10/29/2021    BILITOT 1.20 10/29/2021    LABALBU 4.1 10/29/2021     Lab Results   Component Value Date    BUN 17 10/29/2021    CREATININE 0.69 10/29/2021     Lab Results   Component Value Date    CALCIUM 9.9 10/29/2021    MG 2.1 01/06/2014     Lab Results   Component Value Date    AST 21 10/29/2021    ALT 20 10/29/2021       Lab Results   Component Value Date    CKTOTAL 59 11/08/2014                 Review of Systems Constitutional: Negative for appetite change, chills, diaphoresis, fatigue, fever and unexpected weight change. HENT: Negative for congestion, dental problem, drooling, ear discharge, ear pain, facial swelling, hearing loss, mouth sores, nosebleeds, postnasal drip, sinus pressure, sore throat, tinnitus, trouble swallowing and voice change. Eyes: Negative for photophobia, pain, discharge, redness, itching and visual disturbance. Respiratory: Negative for apnea, cough, choking, chest tightness, shortness of breath and wheezing. Cardiovascular: Negative for chest pain, palpitations, leg swelling and near-syncope. Gastrointestinal: Negative for abdominal distention, abdominal pain, anorexia, blood in stool, bowel incontinence, change in bowel habit, constipation, diarrhea, nausea and vomiting. Endocrine: Negative for cold intolerance, heat intolerance, polydipsia, polyphagia and polyuria. Genitourinary: Negative for bladder incontinence. Musculoskeletal: Negative for arthralgias, back pain, gait problem, joint swelling, myalgias, neck pain and neck stiffness. Skin: Negative for color change, pallor, rash and wound. Allergic/Immunologic: Negative for environmental allergies, food allergies and immunocompromised state. Neurological: Positive for dizziness. Negative for vertigo, tremors, focal weakness, seizures, syncope, facial asymmetry, speech difficulty, weakness, light-headedness, numbness, headaches and loss of balance. Hematological: Negative for adenopathy. Does not bruise/bleed easily. Psychiatric/Behavioral: Positive for decreased concentration. Negative for agitation, behavioral problems, confusion, dysphoric mood, hallucinations, memory loss, self-injury, sleep disturbance and suicidal ideas. The patient is not nervous/anxious and is not hyperactive. Objective:   Physical Exam  Constitutional:       Appearance: She is well-developed.    HENT:      Head: Normocephalic and atraumatic. No raccoon eyes or Abernathy's sign. Right Ear: External ear normal.      Left Ear: External ear normal.      Nose: Nose normal.   Eyes:      Conjunctiva/sclera: Conjunctivae normal.      Pupils: Pupils are equal, round, and reactive to light. Neck:      Thyroid: No thyroid mass or thyromegaly. Vascular: No carotid bruit. Trachea: No tracheal deviation. Meningeal: Brudzinski's sign and Kernig's sign absent. Cardiovascular:      Rate and Rhythm: Normal rate and regular rhythm. Pulmonary:      Effort: Pulmonary effort is normal.   Musculoskeletal:         General: No tenderness. Normal range of motion. Cervical back: Normal range of motion and neck supple. No rigidity. No muscular tenderness. Normal range of motion. Skin:     General: Skin is warm. Coloration: Skin is not pale. Findings: No erythema or rash. Nails: There is no clubbing. Psychiatric:         Attention and Perception: She is attentive. Mood and Affect: Mood is not anxious or depressed. Affect is not labile, blunt or inappropriate. Speech: She is communicative. Speech is not rapid and pressured, delayed, slurred or tangential.         Behavior: Behavior is not agitated, slowed, aggressive, withdrawn, hyperactive or combative. Behavior is cooperative. Thought Content: Thought content is not paranoid or delusional. Thought content does not include homicidal or suicidal ideation. Thought content does not include homicidal or suicidal plan. Cognition and Memory: Cognition is impaired. Memory is impaired. She does not exhibit impaired recent memory or impaired remote memory. Judgment: Judgment is not impulsive or inappropriate. NEUROLOGICAL EXAMINATION :    A) MENTAL STATUS:                   Alert and  oriented  To time, place  And  Person. No Aphasia. No  Dysarthria.                     Able   To  Follow     SIMPLE   commands without   Any  Difficulty. No right  To left confusion. Normal  Speech  And language function. Insight and  Judgment ,Fund  Of  Knowledge   within normal limits                Recent  And  Remote memory   DECREASED                  Attention &Concentration are    DECREASED                                                 B) CRANIAL NERVES :             2 CN : Visual  Acuity  And  Visual fields  within normal limits                        Fundi  Could  Not  Be  Could  Not  Be  Evaluated. 3,4,6 CN : Both  Pupils are   Reactive and  Equal.                            Extraocular   Movements  Are  Intact. No  Nystagmus. No  TESSA. No  Afferent  Pupillary  Defect noted. 5 CN :  Normal  Facial sensations and Corneal  Reflexes           7 CN :  Normal  Facial  Symmetry  And  Strength. No facial  Weakness. 8 CN :  Hearing  Appears subnormal          9, 10 CN: Normal spontaneous, reflex palate movements         11 CN:   Normal  Shoulder shrug and  Strength         12 CN :   Normal  Tongue movements and  Tongue  In midline                        No tongue   Fasciculations or atrophy         C) MOTOR  EXAM:                 Strength  In upper  And  Lower extremities   within normal limits                     Rapid alternating  And  repetitions  Movements  within normal limits               Muscle  Tone  In upper  And  Lower  Extremities  within normal limits                No rigidity. No  Spasticity. Bradykinesia   absent               No  Asterixis. Sustention  Tremor , Resting  Tremor   absent                    No other  Abnormal  Movements noted             D) SENSORY :               light touch, pinprick, position  And  Vibration  DECREASED        E) REFLEXES:                   Deep  Tendon  Reflexes within normal limits                    No pathological  Reflexes  Bilaterally. cm/sec in the proximal, mid and distal segments respectively.       The external carotid artery is patent.  The vertebral artery demonstrates   normal antegrade flow.       No evidence of focal atherosclerotic plaque.       ICA/CCA ratio of 1.0.           Impression   The right internal carotid artery demonstrates 0-50% stenosis .       The left internal carotid artery demonstrates 0-50% stenosis .       Bilateral vertebral arteries are patent with flow in the normal direction.       There is no significant change from prior exam.  No significant plaque   formation is noted. Robertha James is no evidence of hemodynamically significant   stenosis.               Bilateral carotid and vertebral arteries Doppler ultrasound is performed using grayscale, color Doppler and pulse-wave Doppler. Indication: 66-year-old female with a history of cerebral infarction, meralgia paresthetica, memory loss, hyperlipidemia, hypothyroidism, no vertebral basilar insufficiency and history of prior TIAs. Comparison: Prior carotid ultrasound from 2/16/09. Findings: Mild intimal thickening and slight mostly non-echogenic plaque identified. Left carotid artery:      1. The maximum peak systolic velocity in the proximal CCA is 92 cm/s, mid 66cm/s and distal 65cm/s. There is no diameter reduction. 2. The maximum peak systolic velocity in the proximal ICA 55cm/s, mid ICA 69 cm/s and distal ICA 114cm/s. There is no diameter reduction at the proximal ICA. 3. The maximum peak systolic velocity in the left external carotid artery is 110 cm/s. ICA/CCA =0.8. Right carotid artery:      1. The maximum peak systolic velocity in the proximal CCA is 93 cm/s, mid 64 cm/s and distal 65 cm/s. There is no diameter reduction at the right carotid bulb. 2. The maximum peak systolic velocity in the proximal CEY06ac/s, mid ICA 64 cm/s and distal ICA 68cm/s. There is no diameter reduction at the proximal ICA.       3. The maximum peak systolic velocity in the left external carotid artery is 64 cm/s. ICA/CCA= 1.2. There is evidence of bilateral antegrade flow in the vertebral arteries. Impression: No carotid artery stenosis or significant plaque disease. Stable examination compared to 2009. Final report electronically signed by Ariel Kaba M.D. on 12/4/2015        . Plan:          VISITING DIAGNOSIS:      ICD-10-CM    1. Late effects of CVA (cerebrovascular accident)  I69.90    2. RLS (restless legs syndrome)  G25.81    3. Lacunar infarction ([address]  I63.81    4. Dizziness  R42    5. Meralgia paresthetica, unspecified laterality  G57.10    6. H/O TIA (transient ischemic attack) and stroke  Z86.73    7. VBI (vertebrobasilar insufficiency)  G45.0    8. Acquired hypothyroidism  E03.9    9. Cerebral infarction due to thrombosis of precerebral artery (HCC)  I63.00               CONCERNS   &   INCREASED   RISK   FOR         * TIA,  CEREBRO  VASCULAR  ISCHEMIA, STROKE       *   COGNITIVE  &   MEMORY PROBLEMS  AND  DEMENTIA                 VARIOUS  RISK   FACTORS   WERE  REVIEWED   AND   DISCUSSED. PLAN:       Ramila Athena  Of  The  Diagnoses,  The  Management & Treatment  Options           AND    Care  plan  Were        Reviewed and   Discussed   With  patient. * Goals  And  Expectations  Of  The  Therapy  Discussed   And  Reviewed. *   Benefits   And   Side  Effect  Profile  Of  Medication/s   Were   Discussed             * Need   For  Further   Follow up For  The  Various  Problems  Were discussed. * Results  Of  The  Previous  Diagnostic tests were reviewed and questions answered. patient  understand the same. Medical  Decision  Making  Was  Made  Based on the   Complexity  Of  Above  Mentioned  Diagnoses,        Data reviewed   & diagnostic  Tests  Reviewed,  Risk  Of  Significant   Co morbidities and complicating   Factors.                  Medical  Decision  Was High  Complexity  Due   To  The  Patient's  Multiple  Symptoms,  Advancing   Disease,      Complex  Treatment  Regimen,  Multiple medications and   Risk  Of   Side  Effects,  Difficulty  In  Medication  Management      And  Diagnostic  Challenges   In  View  Of  The  Associated   Co  Morbid  Conditions   And  Problems. * FALL   PRECAUTIONS. *   BE  CAREFUL  WITH  ACTIVITIES         *   ADEQUATE   FLUID  INTAKE   AND  ELECTROLYTE  BALANCE         * KEEP  DAIRY  OF   THE  NEUROLOGICAL  SYMPTOMS        RECORDING THE    DURATION  AND  FREQUENCY. *  AVOID    CONDITIONS  AND  FACTORS   THAT  MAKE   NEUROLOGICAL  SYMPTOMS  WORSE. *  TO  MAINTAIN  REGULAR  SLEEP  WAKE  CYCLES. *   TO  HAVE  ADEQUATE  REST  AND   SLEEP    HOURS.          *    TO   AVOID   TO  SLEEP  IN   SUPINE  POSITION. *      WEIGHT   LOSS. *    AVOID  ANY USAGE OF                   TOBACCO,  EXCESSIVE  ALCOHOL  AND   ILLEGAL   SUBSTANCES          *  CONTINUE MEDICATIONS PRESCRIBED BY NEUROLOGIST AS    RECOMMENDED     *   Compliance   With  Medications   And  Instructions      * CURRENTLY  TOLERATING  THE  PRESCRIBED   MEDICATIONS. WITHOUT  ANY  SIGNIFICANT  SIDE  EFFECTS   &  GETTING BENEFIT. *  MILD  SHORT TERM  MEMORY PROBLEMS    FOR    2  YEARS              PATIENT  NOT  CONCERNED. * PATIENT  AWARE  OF  VARIOUS  RISK  FACTORS  FOR  TIA  /  STROKE                    SAME  REVIEWED,  DISCUSSED   WITH  PATIENT  IN  DETAIL.                 *  May   Use  Pill  Box,    If  Needed      *    To  Continue  The    Antiplatelet  therapy    As   Recommended  Was   Discussed              *    Prophylactic  Use   Of     Vitamin   B   Complex,  Folic  Acid,    Vitamin  B12    Multivitamin       Tablet  Daily    Supplementations   Over  The  Counter  Discussed           *  PATIENT  IS  ALSO   COUNSELED   TO  KEEP    ACTIVITIES         A)   SIMPLE      B)  ORGANIZED      C) WRITE   DOWN                    Orders Placed This Encounter   Medications    clopidogrel (PLAVIX) 75 MG tablet     Sig: take 1 tablet by mouth once daily     Dispense:  90 tablet     Refill:  1                   *PATIENT   TO  FOLLOW  UP  WITH   PRIMARY  CARE   AND   OTHER  CONSULTANTS  AS  BEFORE. *  Maintain   Healthy  Life Style    With   Periodic  Monitoring  Of      Any  Medical  Conditions  Including   Elevated  Blood  Pressure,  Lipid  Profile,     Blood  Sugar levels  And   Heart  Disease. *   Period   Screening  For  Cancers  Involving  Breast,  Colon,     lungs  And  Other  Organs  As  Applicable,  In consultation   With  Your  Primary Care Providers. * Second  Neurological  Opinion  And  Evaluations  In  [facility]  Setting  If  Patient  Is  Interested. *  If  The  Patient remains  Neurologically  Stable   Return   To  [facility] Neurology Department       IN     6    MONTHS  TIME   FOR  FURTHER  FOLLOW UP.                 *  If   There is  Any  Significant  Worsening   Of  Current  Symptoms  And  Or  If patient  Develops       Any additional  New  Neurological  Symptoms  Or  Significant  Concerns   Should  Call  911 or      Go  To  Emergency  Department  For  Further  Immediate  Evaluation. *   The  Neurological   Findings,  Possible  Diagnosis,  Differential diagnoses   And  Options      For    Further   Investigations   And  management   Are  Discussed  Comprehensively. Medications   And  Prescription   Risks  And  Side effects  Are   Also  Discussed. The  Above  Were  Reviewed  With  patient and     questions  Answered  In  Detail. More   Than   50% of face  To face Time   Was  Spent  On  Counseling   And   Coordination  Of  Care       Of   Patient's multiple   Neurological  Problems   And   Comorbid  Medical   Conditions.           Electronically signed by Neel Sanchez Mohan Casey MD.,  [address] [place] [address]        Board Certified in  Neurology &  In  [facility] ([facility])      DISCLAIMER:   Although every effort was made to ensure the accuracy of this  electronic transcription, some errors in transcription may have occurred. GENERAL PATIENT INSTRUCTIONS:     A Healthy Lifestyle: Care Instructions  Your Care Instructions  A healthy lifestyle can help you feel good, stay at a healthy weight, and have plenty of energy for both work and play. A healthy lifestyle is something you can share with your whole family. A healthy lifestyle also can lower your risk for serious health problems, such as high blood pressure, heart disease, and diabetes. You can follow a few steps listed below to improve your health and the health of your family. Follow-up care is a key part of your treatment and safety. Be sure to make and go to all appointments, and call your doctor if you are having problems. Its also a good idea to know your test results and keep a list of the medicines you take. How can you care for yourself at home? Do not eat too much sugar, fat, or fast foods. You can still have dessert and treats now and then. The goal is moderation. Start small to improve your eating habits. Pay attention to portion sizes, drink less juice and soda pop, and eat more fruits and vegetables. Eat a healthy amount of food. A 3-ounce serving of meat, for example, is about the size of a deck of cards. Fill the rest of your plate with vegetables and whole grains. Limit the amount of soda and sports drinks you have every day. Drink more water when you are thirsty. Eat at least 5 servings of fruits and vegetables every day. It may seem like a lot, but it is not hard to reach this goal. A serving or helping is 1 piece of fruit, 1 cup of vegetables, or 2 cups of leafy, raw vegetables. Have an apple or some carrot sticks as an afternoon snack instead of a candy bar. errands or to take your child to school or the bus. Go for a family bike ride after dinner instead of watching TV. Where can you learn more? Go to https://mySkinpeClover.StackSafe. org and sign in to your [employer] account. Enter F789 in the Wishabi box to learn more about \"A Healthy Lifestyle: Care Instructions. \"     If you do not have an account, please click on the \"Sign Up Now\" link. Current as of: July 26, 2016  Content Version: 11.2  © 1319-4895 [employer]. Care instructions adapted under license by [facility] ([facility]). If you have questions about a medical condition or this instruction, always ask your healthcare professional. [address] any warranty or liability for your use of this information.

## 2022-01-17 ENCOUNTER — OFFICE VISIT (OUTPATIENT)
Dept: CARDIOLOGY | Age: 72
End: 2022-01-17
Payer: MEDICARE

## 2022-01-17 VITALS
BODY MASS INDEX: 37 KG/M2 | SYSTOLIC BLOOD PRESSURE: 121 MMHG | HEIGHT: 61 IN | DIASTOLIC BLOOD PRESSURE: 63 MMHG | HEART RATE: 73 BPM | WEIGHT: 196 LBS

## 2022-01-17 DIAGNOSIS — R94.31 ABNORMAL ECG: ICD-10-CM

## 2022-01-17 DIAGNOSIS — E78.2 MIXED HYPERLIPIDEMIA: ICD-10-CM

## 2022-01-17 DIAGNOSIS — I63.9 CEREBROVASCULAR ACCIDENT (CVA), UNSPECIFIED MECHANISM (HCC): ICD-10-CM

## 2022-01-17 DIAGNOSIS — I10 HYPERTENSION, ESSENTIAL: Primary | ICD-10-CM

## 2022-01-17 PROCEDURE — 1036F TOBACCO NON-USER: CPT | Performed by: INTERNAL MEDICINE

## 2022-01-17 PROCEDURE — 99214 OFFICE O/P EST MOD 30 MIN: CPT | Performed by: INTERNAL MEDICINE

## 2022-01-17 PROCEDURE — G8417 CALC BMI ABV UP PARAM F/U: HCPCS | Performed by: INTERNAL MEDICINE

## 2022-01-17 PROCEDURE — G8399 PT W/DXA RESULTS DOCUMENT: HCPCS | Performed by: INTERNAL MEDICINE

## 2022-01-17 PROCEDURE — 1090F PRES/ABSN URINE INCON ASSESS: CPT | Performed by: INTERNAL MEDICINE

## 2022-01-17 PROCEDURE — G8427 DOCREV CUR MEDS BY ELIG CLIN: HCPCS | Performed by: INTERNAL MEDICINE

## 2022-01-17 PROCEDURE — 1123F ACP DISCUSS/DSCN MKR DOCD: CPT | Performed by: INTERNAL MEDICINE

## 2022-01-17 PROCEDURE — 93005 ELECTROCARDIOGRAM TRACING: CPT | Performed by: INTERNAL MEDICINE

## 2022-01-17 PROCEDURE — 93010 ELECTROCARDIOGRAM REPORT: CPT | Performed by: INTERNAL MEDICINE

## 2022-01-17 PROCEDURE — 3017F COLORECTAL CA SCREEN DOC REV: CPT | Performed by: INTERNAL MEDICINE

## 2022-01-17 PROCEDURE — 4040F PNEUMOC VAC/ADMIN/RCVD: CPT | Performed by: INTERNAL MEDICINE

## 2022-01-17 PROCEDURE — G8484 FLU IMMUNIZE NO ADMIN: HCPCS | Performed by: INTERNAL MEDICINE

## 2022-01-17 NOTE — PROGRESS NOTES
calcium carbonate (OSCAL) 500 MG TABS tablet Take 500 mg by mouth daily   Yes Historical Provider, MD   albuterol sulfate HFA (PROVENTIL HFA) 108 (90 Base) MCG/ACT inhaler Inhale 2 puffs into the lungs every 6 hours as needed for Wheezing or Shortness of Breath 12/16/19  Yes NORMAN Ahn CNP   fluticasone (FLONASE) 50 MCG/ACT nasal spray 2 sprays to each nostril once daily. 2/22/17  Yes NORMAN Griffith CNP   aspirin 81 MG tablet Take 81 mg by mouth daily. Yes Historical Provider, MD   Vitamin D (CHOLECALCIFEROL) 1000 UNITS CAPS capsule Take 3,000 Units by mouth daily    Yes Historical Provider, MD   Multiple Vitamin (MULTI-VITAMINS PO) Take 1 tablet by mouth Daily. Yes Historical Provider, MD     Allergies:  Sulfamethoxazole-trimethoprim and Penicillins    Social History:   reports that she has never smoked. She has never used smokeless tobacco. She reports that she does not drink alcohol and does not use drugs. Review of Systems:  · Constitutional: there has been no unanticipated weight loss. There's been No change in energy level, No change in activity level. · Eyes: No visual changes or diplopia. No scleral icterus. · ENT: No Headaches, hearing loss or vertigo. No mouth sores or sore throat. · Cardiovascular: As above. · Respiratory: as hpi  · Gastrointestinal: No abdominal pain, appetite loss, blood in stools. No change in bowel or bladder habits. · Genitourinary: No dysuria, trouble voiding, or hematuria. · Musculoskeletal:  No gait disturbance, No weakness or joint complaints. · Integumentary: No rash or pruritis. · Neurological: No headache, diplopia, change in muscle strength, numbness or tingling. No change in gait, balance, coordination, mood, affect, memory, mentation, behavior. · Psychiatric: No anxiety, or depression. · Endocrine: No temperature intolerance. No excessive thirst, fluid intake, or urination. No tremor.   · Hematologic/Lymphatic: No abnormal bruising or bleeding, blood clots or swollen lymph nodes. · Allergic/Immunologic: No nasal congestion or hives. Physical Exam:  /63   Pulse 73   Ht 5' 1\" (1.549 m)   Wt 196 lb (88.9 kg)   LMP 01/01/1998   BMI 37.03 kg/m²   General appearance: alert and cooperative with exam  HEENT: Head: Normocephalic, no lesions, without obvious abnormality. Eyes: PERRL, EOMI  Ears: Not obvious deformations or lack of hearing  Neck: no carotid bruit, no JVD  Lungs: clear to auscultation bilaterally  Heart: regular rate and rhythm, S1, S2 normal, no murmur, click, rub or gallop  Abdomen: soft, non-tender; bowel sounds normal; no masses,  no organomegaly  Extremities: extremities normal, atraumatic, no cyanosis or edema  Neurologic: Mental status: Alert, oriented, thought content appropriate  Skin: WNL for age and condition, no obvious rashes or leasions    Labs:   Lab Results   Component Value Date    CHOL 166 02/05/2019    TRIG 130 02/05/2019    HDL 48 10/29/2021    LDLCHOLESTEROL 90 10/29/2021    VLDL NOT REPORTED (H) 10/29/2021    CHOLHDLRATIO 3.5 10/29/2021     Lab Results   Component Value Date     10/29/2021    K 4.3 10/29/2021     10/29/2021    CO2 28 10/29/2021    BUN 17 10/29/2021    CREATININE 0.69 10/29/2021    GLUCOSE 109 (H) 10/29/2021    CALCIUM 9.9 10/29/2021    PROT 7.6 10/29/2021    LABALBU 4.1 10/29/2021    BILITOT 1.20 10/29/2021    ALKPHOS 104 10/29/2021    AST 21 10/29/2021    ALT 20 10/29/2021    LABGLOM >60 10/29/2021    GFRAA >60 10/29/2021     Cardiac Data:  Diagnostics:    EKG: Sinus  Rhythm   Low voltage -possible pulmonary disease. Echo 1/21-   Normal LV size and function. Ejection fraction is 60%. Normal LA and right cavities size. Normal function of all valves. No evidence of pericardial effusion. No evidence of thrombus in the heart cavities.     Assessment and plan:    1. Abnormal ECG- previously possible old inferior, now no evidence of inferior MI.  Denies any CP, is staying active with no complaints. 2. H/o Normal coronaries on cardiac cath 09/2014  3. HTN: well controlled. 4. HLP: on statin  5. Preserved LV systolic function on Echo 1/21. 6. Loop recorder placed March 27, 2014 and extracted 04/2016 with no documented arrhythmias. 7. CVA/TIA on 1/14/14 at hospital: on ASA and Plavix. 8. Hypothyroidism. The patient is to continue heart healthy diet, weight loss and exercise as tolerated. Patient's medications and side effects were discussed. Medication refills were provided if needed. Follow up appointment timing was discussed. All questions and concerns were addressed to patient's satisfaction. The patient is to follow up in 12 months or sooner if necessary. Thank you for allowing me to participate in the care of this patient, please do not hesitate to call if you have any questions. Ayleen Swenson DO, [facility], [address], [place] [address] [facility]  [email or web address]  [phone]

## 2022-01-31 ENCOUNTER — OFFICE VISIT (OUTPATIENT)
Dept: PRIMARY CARE CLINIC | Age: 72
End: 2022-01-31
Payer: MEDICARE

## 2022-01-31 VITALS
BODY MASS INDEX: 36.47 KG/M2 | DIASTOLIC BLOOD PRESSURE: 80 MMHG | TEMPERATURE: 97.8 F | HEART RATE: 90 BPM | SYSTOLIC BLOOD PRESSURE: 112 MMHG | WEIGHT: 193 LBS | OXYGEN SATURATION: 95 % | RESPIRATION RATE: 16 BRPM

## 2022-01-31 DIAGNOSIS — R05.9 COUGH: ICD-10-CM

## 2022-01-31 DIAGNOSIS — J01.40 ACUTE NON-RECURRENT PANSINUSITIS: Primary | ICD-10-CM

## 2022-01-31 PROCEDURE — 1090F PRES/ABSN URINE INCON ASSESS: CPT | Performed by: NURSE PRACTITIONER

## 2022-01-31 PROCEDURE — 1036F TOBACCO NON-USER: CPT | Performed by: NURSE PRACTITIONER

## 2022-01-31 PROCEDURE — 4040F PNEUMOC VAC/ADMIN/RCVD: CPT | Performed by: NURSE PRACTITIONER

## 2022-01-31 PROCEDURE — G8484 FLU IMMUNIZE NO ADMIN: HCPCS | Performed by: NURSE PRACTITIONER

## 2022-01-31 PROCEDURE — 99213 OFFICE O/P EST LOW 20 MIN: CPT | Performed by: NURSE PRACTITIONER

## 2022-01-31 PROCEDURE — 1123F ACP DISCUSS/DSCN MKR DOCD: CPT | Performed by: NURSE PRACTITIONER

## 2022-01-31 PROCEDURE — G8427 DOCREV CUR MEDS BY ELIG CLIN: HCPCS | Performed by: NURSE PRACTITIONER

## 2022-01-31 PROCEDURE — 3017F COLORECTAL CA SCREEN DOC REV: CPT | Performed by: NURSE PRACTITIONER

## 2022-01-31 PROCEDURE — 99212 OFFICE O/P EST SF 10 MIN: CPT | Performed by: NURSE PRACTITIONER

## 2022-01-31 PROCEDURE — G8399 PT W/DXA RESULTS DOCUMENT: HCPCS | Performed by: NURSE PRACTITIONER

## 2022-01-31 PROCEDURE — G8417 CALC BMI ABV UP PARAM F/U: HCPCS | Performed by: NURSE PRACTITIONER

## 2022-01-31 RX ORDER — PREDNISONE 20 MG/1
20 TABLET ORAL 2 TIMES DAILY
Qty: 10 TABLET | Refills: 0 | Status: SHIPPED | OUTPATIENT
Start: 2022-01-31 | End: 2022-02-05

## 2022-01-31 RX ORDER — DOXYCYCLINE HYCLATE 100 MG
100 TABLET ORAL 2 TIMES DAILY
Qty: 20 TABLET | Refills: 0 | Status: SHIPPED | OUTPATIENT
Start: 2022-01-31 | End: 2022-02-10

## 2022-01-31 RX ORDER — BENZONATATE 100 MG/1
100 CAPSULE ORAL 3 TIMES DAILY PRN
Qty: 30 CAPSULE | Refills: 0 | Status: SHIPPED | OUTPATIENT
Start: 2022-01-31 | End: 2022-04-29 | Stop reason: SDUPTHER

## 2022-01-31 ASSESSMENT — ENCOUNTER SYMPTOMS
SHORTNESS OF BREATH: 1
VOMITING: 0
WHEEZING: 1
SINUS PRESSURE: 1
DIARRHEA: 0
COUGH: 1
NAUSEA: 0

## 2022-01-31 NOTE — PATIENT INSTRUCTIONS
Start doxycycline; take the full course even if you are feeling better. Try to take steroids early in the day to avoid sleep interruptions. --Increase your water intake to help thin secretions and maintain hydration. --Recommend corcidin HBP for cough and congestion. Use cool mist humidifier at bedtime to moisturize air. Use nasal saline rinse as needed for congestion. --Tylenol for fever/body aches/headache. Warm/cold packs to forehead and back of neck can help with headache pain. Warm baths/showers can sooth body aches also. Practice good hand hygiene and cover your cough and sneeze. If symptoms worsen or fail to improve, please return to clinic. If you develop severe worsening of symptoms such as chest pain or difficulty breathing, please go to ER. Patient Education        Sinusitis: Care Instructions  Your Care Instructions     Sinusitis is an infection of the lining of the sinus cavities in your head. Sinusitis often follows a cold. It causes pain and pressure in your head and face. In most cases, sinusitis gets better on its own in 1 to 2 weeks. But some mild symptoms may last for several weeks. Sometimes antibiotics are needed. Follow-up care is a key part of your treatment and safety. Be sure to make and go to all appointments, and call your doctor if you are having problems. It's also a good idea to know your test results and keep a list of the medicines you take. How can you care for yourself at home? · Take an over-the-counter pain medicine, such as acetaminophen (Tylenol), ibuprofen (Advil, Motrin), or naproxen (Aleve). Read and follow all instructions on the label. · If the doctor prescribed antibiotics, take them as directed. Do not stop taking them just because you feel better. You need to take the full course of antibiotics. · Be careful when taking over-the-counter cold or flu medicines and Tylenol at the same time.  Many of these medicines have acetaminophen, which is Tylenol. Read the labels to make sure that you are not taking more than the recommended dose. Too much acetaminophen (Tylenol) can be harmful. · Breathe warm, moist air from a steamy shower, a hot bath, or a sink filled with hot water. Avoid cold, dry air. Using a humidifier in your home may help. Follow the directions for cleaning the machine. · Use saline (saltwater) nasal washes. This can help keep your nasal passages open and wash out mucus and bacteria. You can buy saline nose drops at a grocery store or [employer]. Or you can make your own at home by adding 1 teaspoon of salt and 1 teaspoon of baking soda to 2 cups of distilled water. If you make your own, fill a bulb syringe with the solution, insert the tip into your nostril, and squeeze gently. Shan Like your nose. · Put a hot, wet towel or a warm gel pack on your face 3 or 4 times a day for 5 to 10 minutes each time. · Try a decongestant nasal spray like oxymetazoline (Afrin). Do not use it for more than 3 days in a row. Using it for more than 3 days can make your congestion worse. When should you call for help? Call your doctor now or seek immediate medical care if:    · You have new or worse swelling or redness in your face or around your eyes.     · You have a new or higher fever. Watch closely for changes in your health, and be sure to contact your doctor if:    · You have new or worse facial pain.     · The mucus from your nose becomes thicker (like pus) or has new blood in it.     · You are not getting better as expected. Where can you learn more? Go to https://Okta.Tinker Square. org and sign in to your NextCare account. Enter F028 in the Lifesquare box to learn more about \"Sinusitis: Care Instructions. \"     If you do not have an account, please click on the \"Sign Up Now\" link. Current as of: September 8, 2021               Content Version: 13.1  © 5016-4093 Healthwise, Incorporated.    Care instructions adapted under license by [facility] ([facility]). If you have questions about a medical condition or this instruction, always ask your healthcare professional. [address] any warranty or liability for your use of this information. Patient Education        Saline Nasal Washes: Care Instructions  Your Care Instructions     Saline nasal washes help keep the nasal passages open by washing out thick or dried mucus. This simple remedy can help relieve symptoms of allergies, sinusitis, and colds. It also can make the nose feel more comfortable by keeping the mucous membranes moist. You may notice a little burning sensation in your nose the first few times you use the solution, but this usually gets better in a few days. Follow-up care is a key part of your treatment and safety. Be sure to make and go to all appointments, and call your doctor if you are having problems. It's also a good idea to know your test results and keep a list of the medicines you take. How can you care for yourself at home? · You can buy premixed saline solution in a squeeze bottle or other sinus rinse products at a drugstore. Read and follow the instructions on the label. · You also can make your own saline solution by adding 1 teaspoon of salt and 1 teaspoon of baking soda to 2 cups of distilled water. · If you use a homemade solution, pour a small amount into a clean bowl. Using a rubber bulb syringe, squeeze the syringe and place the tip in the salt water. Pull a small amount of the salt water into the syringe by relaxing your hand. · Sit down with your head tilted slightly back. Do not lie down. Put the tip of the bulb syringe or the squeeze bottle a little way into one of your nostrils. Gently drip or squirt a few drops into the nostril. Repeat with the other nostril. Some sneezing and gagging are normal at first.  · Gently blow your nose. · Wipe the syringe or bottle tip clean after each use.   · Repeat this 2 or 3 times a day.  · Use nasal washes gently if you have nosebleeds often. When should you call for help? Watch closely for changes in your health, and be sure to contact your doctor if:    · You often get nosebleeds.     · You have problems doing the nasal washes. Where can you learn more? Go to https://chpepiceweb.disco volante. org and sign in to your Medudemt account. Enter [id number] in the JNS Towers box to learn more about \"Saline Nasal Washes: Care Instructions. \"     If you do not have an account, please click on the \"Sign Up Now\" link. Current as of: September 8, 2021               Content Version: 13.1  © 2006-2021 [employer]. Care instructions adapted under license by [employer] [facility] ([facility]). If you have questions about a medical condition or this instruction, always ask your healthcare professional. [place] [address] any warranty or liability for your use of this information. Patient Education        Cough: Care Instructions  Your Care Instructions     A cough is your body's response to something that bothers your throat or airways. Many things can cause a cough. You might cough because of a cold or the flu, bronchitis, or asthma. Smoking, postnasal drip, allergies, and stomach acid that backs up into your throat also can cause coughs. A cough is a symptom, not a disease. Most coughs stop when the cause, such as a cold, goes away. You can take a few steps at home to cough less and feel better. Follow-up care is a key part of your treatment and safety. Be sure to make and go to all appointments, and call your doctor if you are having problems. It's also a good idea to know your test results and keep a list of the medicines you take. How can you care for yourself at home? · Drink lots of water and other fluids. This helps thin the mucus and soothes a dry or sore throat. Honey or lemon juice in hot water or tea may ease a dry cough.   · Take cough medicine as directed by your doctor. · Prop up your head on pillows to help you breathe and ease a dry cough. · Try cough drops to soothe a dry or sore throat. Cough drops don't stop a cough. Medicine-flavored cough drops are no better than candy-flavored drops or hard candy. · Do not smoke. Avoid secondhand smoke. If you need help quitting, talk to your doctor about stop-smoking programs and medicines. These can increase your chances of quitting for good. When should you call for help? Call 911 anytime you think you may need emergency care. For example, call if:    · You have severe trouble breathing. Call your doctor now or seek immediate medical care if:    · You cough up blood.     · You have new or worse trouble breathing.     · You have a new or higher fever.     · You have a new rash. Watch closely for changes in your health, and be sure to contact your doctor if:    · You cough more deeply or more often, especially if you notice more mucus or a change in the color of your mucus.     · You have new symptoms, such as a sore throat, an earache, or sinus pain.     · You do not get better as expected. Where can you learn more? Go to https://ElementsLocalpeTradehill.TribaLearning. org and sign in to your Five-Thirty account. Enter D279 in the OraMetrix box to learn more about \"Cough: Care Instructions. \"     If you do not have an account, please click on the \"Sign Up Now\" link. Current as of: July 6, 2021               Content Version: 13.1  © 2006-2021 Healthwise, Incorporated. Care instructions adapted under license by [facility] ([facility]). If you have questions about a medical condition or this instruction, always ask your healthcare professional. [place] [address] any warranty or liability for your use of this information.

## 2022-01-31 NOTE — PROGRESS NOTES
[place] [address] [address] [facility] [address]  [place] [address]  Dept: [phone]  Dept Fax: [phone] [address] [facility]       Chief Complaint   Patient presents with    Cough     right ear fullness, x1 day       Nurses Notes reviewed and I agree except as noted in the HPI. HISTORY OF PRESENT ILLNESS   Salome Clifford is a 66 y.o. female who presents to [facility] today (1/31/2022) for evaluation of:   Cough  This is a new problem. The current episode started in the past 7 days (States had a sinus cold for appx 2 weeks that was improving when her cough worsened a few days ago. ). The problem has been gradually worsening. The problem occurs every few minutes. The cough is productive of purulent sputum. Associated symptoms include postnasal drip, shortness of breath (mild, occasional) and wheezing (occasional). Pertinent negatives include no chest pain, chills or fever. Treatments tried: nyquil last night. The treatment provided no relief. Her past medical history is significant for bronchitis. There is no history of asthma, COPD or pneumonia. REVIEW OF SYSTEMS     Review of Systems   Constitutional: Positive for fatigue (mild). Negative for chills, diaphoresis and fever. HENT: Positive for congestion, postnasal drip and sinus pressure. Respiratory: Positive for cough, shortness of breath (mild, occasional) and wheezing (occasional). Cardiovascular: Negative for chest pain and palpitations. Gastrointestinal: Negative for diarrhea, nausea and vomiting.        PAST MEDICAL HISTORY         Diagnosis Date    Acute confusion     CVA (cerebral infarction)     Hyperlipidemia     Hypothyroidism     Lacunar infarction ([facility] [address]     Memory loss due to medical condition     Meralgia paresthetica     left side    Myopia with astigmatism and presbyopia     Perennial allergic rhinitis     RLS (restless legs syndrome)     TIA (transient ischemic attack) 2008, 1/06/2014    with brief confusion    VBI (vertebrobasilar insufficiency)        SURGICAL HISTORY     Patient  has a past surgical history that includes pre-malignant / benign skin lesion excision; [place] tooth extraction (1981); Cardiac catheterization (9/2014); Colonoscopy (02/25/2015); Insertable Cardiac Monitor (4/22/16); Intracapsular cataract extraction (Right, 9/29/2020); Intracapsular cataract extraction (Left, 10/27/2020); and Cataract removal (Bilateral, 2020). CURRENT MEDICATIONS       Outpatient Medications Prior to Visit   Medication Sig Dispense Refill    clopidogrel (PLAVIX) 75 MG tablet take 1 tablet by mouth once daily 90 tablet 1    atorvastatin (LIPITOR) 10 MG tablet take 1 tablet by mouth once daily 90 tablet 2    rOPINIRole (REQUIP) 1 MG tablet take 1 tablet by mouth at bedtime 90 tablet 1    levothyroxine (SYNTHROID) 125 MCG tablet take 1 tablet by mouth once daily 90 tablet 1    metoprolol tartrate (LOPRESSOR) 25 MG tablet take 1 tablet by mouth twice a day 180 tablet 3    clotrimazole-betamethasone (LOTRISONE) 1-0.05 % cream Apply topically 2 times daily prn. 30 g 2    calcium carbonate (OSCAL) 500 MG TABS tablet Take 500 mg by mouth daily      albuterol sulfate HFA (PROVENTIL HFA) 108 (90 Base) MCG/ACT inhaler Inhale 2 puffs into the lungs every 6 hours as needed for Wheezing or Shortness of Breath 1 Inhaler 0    fluticasone (FLONASE) 50 MCG/ACT nasal spray 2 sprays to each nostril once daily. 1 Bottle 11    aspirin 81 MG tablet Take 81 mg by mouth daily.  Vitamin D (CHOLECALCIFEROL) 1000 UNITS CAPS capsule Take 3,000 Units by mouth daily       Multiple Vitamin (MULTI-VITAMINS PO) Take 1 tablet by mouth Daily. No facility-administered medications prior to visit. ALLERGIES     Patient is is allergic to sulfamethoxazole-trimethoprim and penicillins.     FAMILY HISTORY Patient's family history includes Breast Cancer in her maternal grandmother and sister; Cancer in her sister; Diabetes in her brother, brother, and father; Heart Disease in her father; High Cholesterol in her sister; Liver Disease in her father; Other in her mother and paternal aunt; Stroke in her mother; Thyroid Disease in her sister. SOCIAL HISTORY     Patient  reports that she has never smoked. She has never used smokeless tobacco. She reports that she does not drink alcohol and does not use drugs. PHYSICAL EXAM     VITALS  BP: 112/80, Temp: 97.8 °F (36.6 °C), Pulse: 90, Resp: 16, SpO2: 95 %  Physical Exam  Vitals reviewed. Constitutional:       General: She is not in acute distress. Appearance: She is not toxic-appearing. HENT:      Right Ear: Ear canal normal. A middle ear effusion is present. Tympanic membrane is not erythematous. Left Ear: Ear canal normal. A middle ear effusion is present. Tympanic membrane is not erythematous. Nose: Congestion and rhinorrhea present. Rhinorrhea is clear. Right Sinus: Maxillary sinus tenderness and frontal sinus tenderness present. Left Sinus: Maxillary sinus tenderness and frontal sinus tenderness present. Comments: Turbinates inflamed     Mouth/Throat:      Lips: Pink. Mouth: Mucous membranes are moist.      Pharynx: Oropharynx is clear. No oropharyngeal exudate or posterior oropharyngeal erythema. Tonsils: No tonsillar exudate. Comments: Drainage noted in posterior pharynx  Cardiovascular:      Rate and Rhythm: Normal rate and regular rhythm. Heart sounds: Normal heart sounds, S1 normal and S2 normal. No murmur heard. Pulmonary:      Effort: Pulmonary effort is normal. No accessory muscle usage, prolonged expiration, respiratory distress or retractions. Breath sounds: Normal breath sounds. No decreased breath sounds, wheezing or rhonchi.    Musculoskeletal:      Cervical back: Normal range of motion and neck supple. Lymphadenopathy:      Cervical: No cervical adenopathy. Skin:     General: Skin is warm and dry. Capillary Refill: Capillary refill takes less than 2 seconds. Neurological:      General: No focal deficit present. Mental Status: She is alert. DIAGNOSTIC RESULTS   Labs:No results found for this visit on 01/31/22. IMAGING:        CLINICAL COURSE:     Vitals:    01/31/22 1409 01/31/22 1430   BP: 112/80    Pulse: 80 90   Resp: 20 16   Temp: 97.8 °F (36.6 °C)    SpO2: 91% 95%   Weight: 193 lb (87.5 kg)            PROCEDURES:  None  FINAL IMPRESSION      1. Acute non-recurrent pansinusitis    2. Cough         DISPOSITION/PLAN     Will start pt on course of doxycycline and short course of prednisone. Will also send in tessalon PRN for her cough. Instructed pt to use her albuterol inhaler every 4-6 hours as needed for wheezing. Increase fluids, coricidin HBP for cough and congestion, sinus rinses, and cool mist humidifier at bedside. Encouraged to return to clinic should symptoms worsen or any concerns arise. The use, risks, benefits, and potential side effects of prescribed and/or recommended medications were discussed. All questions were answered and the patient/caregiver voiced understanding. Patient Instructions     Start doxycycline; take the full course even if you are feeling better. Try to take steroids early in the day to avoid sleep interruptions. --Increase your water intake to help thin secretions and maintain hydration. --Recommend corcidin HBP for cough and congestion. Use cool mist humidifier at bedtime to moisturize air. Use nasal saline rinse as needed for congestion. --Tylenol for fever/body aches/headache. Warm/cold packs to forehead and back of neck can help with headache pain. Warm baths/showers can sooth body aches also. Practice good hand hygiene and cover your cough and sneeze.  If symptoms worsen or fail to improve, please return to clinic. If you develop severe worsening of symptoms such as chest pain or difficulty breathing, please go to ER. Patient Education        Sinusitis: Care Instructions  Your Care Instructions     Sinusitis is an infection of the lining of the sinus cavities in your head. Sinusitis often follows a cold. It causes pain and pressure in your head and face. In most cases, sinusitis gets better on its own in 1 to 2 weeks. But some mild symptoms may last for several weeks. Sometimes antibiotics are needed. Follow-up care is a key part of your treatment and safety. Be sure to make and go to all appointments, and call your doctor if you are having problems. It's also a good idea to know your test results and keep a list of the medicines you take. How can you care for yourself at home? · Take an over-the-counter pain medicine, such as acetaminophen (Tylenol), ibuprofen (Advil, Motrin), or naproxen (Aleve). Read and follow all instructions on the label. · If the doctor prescribed antibiotics, take them as directed. Do not stop taking them just because you feel better. You need to take the full course of antibiotics. · Be careful when taking over-the-counter cold or flu medicines and Tylenol at the same time. Many of these medicines have acetaminophen, which is Tylenol. Read the labels to make sure that you are not taking more than the recommended dose. Too much acetaminophen (Tylenol) can be harmful. · Breathe warm, moist air from a steamy shower, a hot bath, or a sink filled with hot water. Avoid cold, dry air. Using a humidifier in your home may help. Follow the directions for cleaning the machine. · Use saline (saltwater) nasal washes. This can help keep your nasal passages open and wash out mucus and bacteria. You can buy saline nose drops at a grocery store or drugstore. Or you can make your own at home by adding 1 teaspoon of salt and 1 teaspoon of baking soda to 2 cups of distilled water.  If you make your own, fill a bulb syringe with the solution, insert the tip into your nostril, and squeeze gently. Derrill Klippel your nose. · Put a hot, wet towel or a warm gel pack on your face 3 or 4 times a day for 5 to 10 minutes each time. · Try a decongestant nasal spray like oxymetazoline (Afrin). Do not use it for more than 3 days in a row. Using it for more than 3 days can make your congestion worse. When should you call for help? Call your doctor now or seek immediate medical care if:    · You have new or worse swelling or redness in your face or around your eyes.     · You have a new or higher fever. Watch closely for changes in your health, and be sure to contact your doctor if:    · You have new or worse facial pain.     · The mucus from your nose becomes thicker (like pus) or has new blood in it.     · You are not getting better as expected. Where can you learn more? Go to https://KellBenx.Chanyouji. org and sign in to your [employer] account. Enter F240 in the [employer] box to learn more about \"Sinusitis: Care Instructions. \"     If you do not have an account, please click on the \"Sign Up Now\" link. Current as of: September 8, 2021               Content Version: 13.1  © 4367-2223 Healthwise, Incorporated. Care instructions adapted under license by [facility] ([facility]). If you have questions about a medical condition or this instruction, always ask your healthcare professional. [place] [address] any warranty or liability for your use of this information. Patient Education        Saline Nasal Washes: Care Instructions  Your Care Instructions     Saline nasal washes help keep the nasal passages open by washing out thick or dried mucus. This simple remedy can help relieve symptoms of allergies, sinusitis, and colds.  It also can make the nose feel more comfortable by keeping the mucous membranes moist. You may notice a little burning sensation in your nose the first few times you use the solution, but this usually gets better in a few days. Follow-up care is a key part of your treatment and safety. Be sure to make and go to all appointments, and call your doctor if you are having problems. It's also a good idea to know your test results and keep a list of the medicines you take. How can you care for yourself at home? · You can buy premixed saline solution in a squeeze bottle or other sinus rinse products at a drugstore. Read and follow the instructions on the label. · You also can make your own saline solution by adding 1 teaspoon of salt and 1 teaspoon of baking soda to 2 cups of distilled water. · If you use a homemade solution, pour a small amount into a clean bowl. Using a rubber bulb syringe, squeeze the syringe and place the tip in the salt water. Pull a small amount of the salt water into the syringe by relaxing your hand. · Sit down with your head tilted slightly back. Do not lie down. Put the tip of the bulb syringe or the squeeze bottle a little way into one of your nostrils. Gently drip or squirt a few drops into the nostril. Repeat with the other nostril. Some sneezing and gagging are normal at first.  · Gently blow your nose. · Wipe the syringe or bottle tip clean after each use. · Repeat this 2 or 3 times a day. · Use nasal washes gently if you have nosebleeds often. When should you call for help? Watch closely for changes in your health, and be sure to contact your doctor if:    · You often get nosebleeds.     · You have problems doing the nasal washes. Where can you learn more? Go to https://Psykosoftpeleydieb.[employer]. org and sign in to your [employer] account. Enter [id number] [id number] [phone] in the [facility] box to learn more about \"Saline Nasal Washes: Care Instructions. \"     If you do not have an account, please click on the \"Sign Up Now\" link. Current as of: September 8, 2021               Content Version: 13.1  © 0530-8296 Healthwise, [facility].    Care instructions adapted under license by [facility] ([facility]). If you have questions about a medical condition or this instruction, always ask your healthcare professional. [address] any warranty or liability for your use of this information. Patient Education        Cough: Care Instructions  Your Care Instructions     A cough is your body's response to something that bothers your throat or airways. Many things can cause a cough. You might cough because of a cold or the flu, bronchitis, or asthma. Smoking, postnasal drip, allergies, and stomach acid that backs up into your throat also can cause coughs. A cough is a symptom, not a disease. Most coughs stop when the cause, such as a cold, goes away. You can take a few steps at home to cough less and feel better. Follow-up care is a key part of your treatment and safety. Be sure to make and go to all appointments, and call your doctor if you are having problems. It's also a good idea to know your test results and keep a list of the medicines you take. How can you care for yourself at home? · Drink lots of water and other fluids. This helps thin the mucus and soothes a dry or sore throat. Honey or lemon juice in hot water or tea may ease a dry cough. · Take cough medicine as directed by your doctor. · Prop up your head on pillows to help you breathe and ease a dry cough. · Try cough drops to soothe a dry or sore throat. Cough drops don't stop a cough. Medicine-flavored cough drops are no better than candy-flavored drops or hard candy. · Do not smoke. Avoid secondhand smoke. If you need help quitting, talk to your doctor about stop-smoking programs and medicines. These can increase your chances of quitting for good. When should you call for help? Call 911 anytime you think you may need emergency care. For example, call if:    · You have severe trouble breathing.    Call your doctor now or seek immediate medical care if:    · You cough up blood.     · You have new or worse trouble breathing.     · You have a new or higher fever.     · You have a new rash. Watch closely for changes in your health, and be sure to contact your doctor if:    · You cough more deeply or more often, especially if you notice more mucus or a change in the color of your mucus.     · You have new symptoms, such as a sore throat, an earache, or sinus pain.     · You do not get better as expected. Where can you learn more? Go to https://Miragen [employer]rapeutics.[employer]. org and sign in to your [employer] account. Enter D279 in the [employer] box to learn more about \"Cough: Care Instructions. \"     If you do not have an account, please click on the \"Sign Up Now\" link. Current as of: July 6, 2021               Content Version: 13.1  © 6671-3053 [employer]. Care instructions adapted under license by [facility] ([facility]). If you have questions about a medical condition or this instruction, always ask your healthcare professional. [place] [address] any warranty or liability for your use of this information. No orders of the defined types were placed in this encounter.     Outpatient Encounter Medications as of 1/31/2022   Medication Sig Dispense Refill    doxycycline hyclate (VIBRA-TABS) 100 MG tablet Take 1 tablet by mouth 2 times daily for 10 days 20 tablet 0    predniSONE (DELTASONE) 20 MG tablet Take 1 tablet by mouth 2 times daily for 5 days 10 tablet 0    benzonatate (TESSALON) 100 MG capsule Take 1 capsule by mouth 3 times daily as needed for Cough 30 capsule 0    clopidogrel (PLAVIX) 75 MG tablet take 1 tablet by mouth once daily 90 tablet 1    atorvastatin (LIPITOR) 10 MG tablet take 1 tablet by mouth once daily 90 tablet 2    rOPINIRole (REQUIP) 1 MG tablet take 1 tablet by mouth at bedtime 90 tablet 1    levothyroxine (SYNTHROID) 125 MCG tablet take 1 tablet by mouth once daily 90 tablet 1    metoprolol tartrate (LOPRESSOR) 25 MG tablet take 1 tablet by mouth twice a day 180 tablet 3    clotrimazole-betamethasone (LOTRISONE) 1-0.05 % cream Apply topically 2 times daily prn. 30 g 2    calcium carbonate (OSCAL) 500 MG TABS tablet Take 500 mg by mouth daily      albuterol sulfate HFA (PROVENTIL HFA) 108 (90 Base) MCG/ACT inhaler Inhale 2 puffs into the lungs every 6 hours as needed for Wheezing or Shortness of Breath 1 Inhaler 0    fluticasone (FLONASE) 50 MCG/ACT nasal spray 2 sprays to each nostril once daily. 1 Bottle 11    aspirin 81 MG tablet Take 81 mg by mouth daily.  Vitamin D (CHOLECALCIFEROL) 1000 UNITS CAPS capsule Take 3,000 Units by mouth daily       Multiple Vitamin (MULTI-VITAMINS PO) Take 1 tablet by mouth Daily. No facility-administered encounter medications on file as of 1/31/2022. Return if symptoms worsen or fail to improve.                 Electronically signed by NORMAN Gar CNP on 1/31/2022 at 3:00 PM

## 2022-04-20 DIAGNOSIS — E03.9 ACQUIRED HYPOTHYROIDISM: ICD-10-CM

## 2022-04-20 RX ORDER — LEVOTHYROXINE SODIUM 0.12 MG/1
TABLET ORAL
Qty: 90 TABLET | Refills: 0 | Status: SHIPPED | OUTPATIENT
Start: 2022-04-20 | End: 2022-04-29 | Stop reason: SDUPTHER

## 2022-04-20 NOTE — TELEPHONE ENCOUNTER
Kia Szymanski called requesting a refill of the below medication which has been pended for you:     Requested Prescriptions     Pending Prescriptions Disp Refills    levothyroxine (SYNTHROID) 125 MCG tablet [Pharmacy Med Name: LEVOTHYROXINE 125 MCG TABLET] 90 tablet 1     Sig: take 1 tablet by mouth once daily       Last Appointment Date: 11/10/2021  Next Appointment Date: 4/29/2022    Allergies   Allergen Reactions    Sulfamethoxazole-Trimethoprim Swelling     lips    Penicillins Rash

## 2022-04-29 ENCOUNTER — OFFICE VISIT (OUTPATIENT)
Dept: FAMILY MEDICINE CLINIC | Age: 72
End: 2022-04-29
Payer: MEDICARE

## 2022-04-29 VITALS
HEIGHT: 61 IN | SYSTOLIC BLOOD PRESSURE: 124 MMHG | WEIGHT: 193.8 LBS | HEART RATE: 67 BPM | TEMPERATURE: 97.1 F | OXYGEN SATURATION: 99 % | BODY MASS INDEX: 36.59 KG/M2 | DIASTOLIC BLOOD PRESSURE: 70 MMHG

## 2022-04-29 DIAGNOSIS — E55.9 VITAMIN D DEFICIENCY: ICD-10-CM

## 2022-04-29 DIAGNOSIS — E78.2 MIXED HYPERLIPIDEMIA: ICD-10-CM

## 2022-04-29 DIAGNOSIS — Z12.31 BREAST CANCER SCREENING BY MAMMOGRAM: ICD-10-CM

## 2022-04-29 DIAGNOSIS — R73.01 ELEVATED FASTING GLUCOSE: ICD-10-CM

## 2022-04-29 DIAGNOSIS — E03.9 ACQUIRED HYPOTHYROIDISM: Primary | ICD-10-CM

## 2022-04-29 DIAGNOSIS — L30.4 INTERTRIGO: ICD-10-CM

## 2022-04-29 DIAGNOSIS — R05.9 COUGH: ICD-10-CM

## 2022-04-29 DIAGNOSIS — E66.01 SEVERE OBESITY (BMI 35.0-39.9) WITH COMORBIDITY (HCC): ICD-10-CM

## 2022-04-29 PROCEDURE — 99214 OFFICE O/P EST MOD 30 MIN: CPT | Performed by: FAMILY MEDICINE

## 2022-04-29 PROCEDURE — 1036F TOBACCO NON-USER: CPT | Performed by: FAMILY MEDICINE

## 2022-04-29 PROCEDURE — 4040F PNEUMOC VAC/ADMIN/RCVD: CPT | Performed by: FAMILY MEDICINE

## 2022-04-29 PROCEDURE — G8417 CALC BMI ABV UP PARAM F/U: HCPCS | Performed by: FAMILY MEDICINE

## 2022-04-29 PROCEDURE — G8399 PT W/DXA RESULTS DOCUMENT: HCPCS | Performed by: FAMILY MEDICINE

## 2022-04-29 PROCEDURE — 3017F COLORECTAL CA SCREEN DOC REV: CPT | Performed by: FAMILY MEDICINE

## 2022-04-29 PROCEDURE — 1090F PRES/ABSN URINE INCON ASSESS: CPT | Performed by: FAMILY MEDICINE

## 2022-04-29 PROCEDURE — G8427 DOCREV CUR MEDS BY ELIG CLIN: HCPCS | Performed by: FAMILY MEDICINE

## 2022-04-29 PROCEDURE — 1123F ACP DISCUSS/DSCN MKR DOCD: CPT | Performed by: FAMILY MEDICINE

## 2022-04-29 PROCEDURE — 99212 OFFICE O/P EST SF 10 MIN: CPT

## 2022-04-29 RX ORDER — BENZONATATE 100 MG/1
100 CAPSULE ORAL 3 TIMES DAILY PRN
Qty: 30 CAPSULE | Refills: 0 | Status: SHIPPED | OUTPATIENT
Start: 2022-04-29 | End: 2022-05-09

## 2022-04-29 RX ORDER — CLOTRIMAZOLE AND BETAMETHASONE DIPROPIONATE 10; .64 MG/G; MG/G
CREAM TOPICAL
Qty: 30 G | Refills: 2 | Status: CANCELLED | OUTPATIENT
Start: 2022-04-29

## 2022-04-29 RX ORDER — LEVOTHYROXINE SODIUM 0.12 MG/1
TABLET ORAL
Qty: 90 TABLET | Refills: 1 | Status: SHIPPED | OUTPATIENT
Start: 2022-04-29 | End: 2022-11-01 | Stop reason: SDUPTHER

## 2022-04-29 RX ORDER — CLOTRIMAZOLE 1 %
CREAM (GRAM) TOPICAL
Qty: 1 EACH | Refills: 1 | Status: SHIPPED | OUTPATIENT
Start: 2022-04-29 | End: 2022-05-06

## 2022-04-29 RX ORDER — NYSTATIN 100000 [USP'U]/G
POWDER TOPICAL
Qty: 1 EACH | Refills: 2 | Status: SHIPPED | OUTPATIENT
Start: 2022-04-29

## 2022-04-29 NOTE — PROGRESS NOTES
[facility] [address], [address]                        Telephone [phone]             Fax [phone]       Sumit Nguyen  :  1950  Age:  66 y.o. MRN:  [id number]  Date of visit:  2022       Assessment and Plan:    1. Acquired hypothyroidism  TSH and free T4 were within normal limits on 2021:  Lab Results   Component Value Date    TSH 2.12 2021    THYROXINE 1.51 2021      She was advised to continue with her current dose of Levothyroxine. Levothyroxine was refilled:   - levothyroxine (SYNTHROID) 125 MCG tablet; take 1 tablet by mouth once daily  Dispense: 90 tablet; Refill: 1    Labs were ordered to be done when she returns for fasting labs:  - TSH; Future  - T4, Free; Future    She will be contacted when the results are available. 2. Mixed hyperlipidemia  She has orders for a lipid panel. She will be contacted when the results are available. 3. Elevated fasting glucose  She has orders for a comprehensive panel and HgbA1c. She will be contacted when the results are available. 4. Vitamin D deficiency  Her 25-hydroxy Vitamin D level was within normal limits (40.4   ng/mL) on 2020. She was advised to continue with her current dose of Vitamin D.      - 25-hydroxy Vitamin D level; Future was ordered to be done when she returns for fasting labs. She will be contacted when the results are available. 5. Cough  Likely viral.  Tessalon was refilled:  - benzonatate (TESSALON) 100 MG capsule; Take 1 capsule by mouth 3 times daily as needed for Cough  Dispense: 30 capsule; Refill: 0    6. Intertrigo  Clotrimazole cream and Nystatin powder were prescribed:  - clotrimazole (LOTRIMIN) 1 % cream; Apply topically 2 times daily. Dispense: 1 each; Refill: 1  - nystatin (MYCOSTATIN) 377163 UNIT/GM powder; Apply 3 times daily. Dispense: 1 each; Refill: 2    7.  Severe obesity (BMI 35.0-39. 9) with comorbidity ([address]  Her weight has increased 4 pounds in the past 10 months. 8. Routine health maintenance  Health maintenance was reviewed with the patient. She has received two doses of the Moderna Covid-19 vaccine. Screening mammogram was recommended and ordered. All other health maintenance, including Shingrix, Tdap, Pneumovax, and Prevnar-13, is up to date at this time. Follow up instructions were given to the patient:  Return in about 6 months (around 10/29/2022) for hypertension, hyperlipidemia, elevated glucose. Subjective:    Vianney Martin is a 66 y.o. female who presents to [place] [id number] today (4/29/2022) for follow up/evaluation of:  6 Month Follow-Up, Hyperlipidemia, Hypothyroidism, Other (Elevated fasting glucose), Nausea (Patient reports episodes of nausea that occurred 3-4 times in March 2022, but no episodes in April.), and Sinus Problem (Patient reports sinus congestion and cough that started 4/15/22. No shortness of breath or fever. Symptoms are improved, but is requesting Tessalon Perles. Has not been tested for covid.)      She is here today for follow up of hyperlipidemia, elevated glucose, and hypothyroidism. She is tolerating her medications well. She has not had the labs drawn that were ordered to be done before today's visit. She states that she has had some sinus congestion and cough for the past couple of weeks. Her symptoms are improving. She requests a refill of Tessalon. She also reports 3 brief episodes of nausea a few weeks ago. She has not had any symptoms in the past 2 weeks. She also requests a refill of Lotrisone. She has had issues with intermittent rashes under her breasts and under her abdominal folds. She has received two doses of the Moderna Covid-19 vaccine. The second dose was 3/26/2021.          Immunization history was reviewed:  Immunization History   Administered Date(s) Administered  COVID-19, Moderna, Primary or Immunocompromised, PF, 100mcg/0.5mL 02/26/2021, 03/26/2021    Hepatitis B 11/18/1997, 12/19/1998, 06/04/1999    Influenza A ([id number]) Vaccine PF IM 02/11/2010    Influenza Vaccine, unspecified formulation 01/21/2016    Influenza, High Dose (Fluzone 65 yrs and older) 01/21/2016, 11/14/2017, 11/06/2018, 09/27/2019    Influenza, Quadv, IM, PF (6 mo and older Fluzone, Flulaval, Fluarix, and 3 yrs and older Afluria) 01/23/2017    Influenza, Quadv, adjuvanted, 65 yrs +, IM, PF (Fluad) 10/24/2020, 09/29/2021    Pneumococcal Conjugate 13-valent ([id number]) 01/02/2015    Pneumococcal Polysaccharide ([id number]) 08/30/2013    Tdap (Boostrix, Adacel) 12/21/2009, 01/28/2019    Zoster Live (Zostavax) 08/16/2012    Zoster Recombinant (Shingrix) 05/01/2019, 09/27/2019          She has the following problem list:  Patient Active Problem List   Diagnosis    Hypothyroidism    Hyperlipidemia    Cerebral infarction ([address]    Memory loss due to medical condition    TIA (transient ischemic attack)    Meralgia paresthetica    Cataract    Myopia of both eyes with astigmatism and presbyopia    RLS (restless legs syndrome)    Myopia of both eyes    Lacunar infarction ([address]    CVA (cerebral vascular accident) ([address]    VBI (vertebrobasilar insufficiency)    JOHNATHAN due to [address] Mixed hyperlipidemia    Cerebrovascular accident (CVA) due to thrombosis of precerebral artery ([address]    Cerebral infarction due to thrombosis of vertebral artery ([address]    Late effects of CVA (cerebrovascular accident)    Other hyperlipidemia    Dizziness        Current medications are:  Outpatient Medications Marked as Taking for the 4/29/22 encounter (Office Visit) with Jeffery Parnell MD   Medication Sig Dispense Refill    levothyroxine (SYNTHROID) 125 MCG tablet take 1 tablet by mouth once daily 90 tablet 0    clopidogrel (PLAVIX) 75 MG tablet take 1 tablet by mouth once daily 90 tablet 1    atorvastatin (LIPITOR) 10 MG tablet take 1 tablet by mouth once daily 90 tablet 2    rOPINIRole (REQUIP) 1 MG tablet take 1 tablet by mouth at bedtime 90 tablet 1    metoprolol tartrate (LOPRESSOR) 25 MG tablet take 1 tablet by mouth twice a day 180 tablet 3    clotrimazole-betamethasone (LOTRISONE) 1-0.05 % cream Apply topically 2 times daily prn. 30 g 2    calcium carbonate (OSCAL) 500 MG TABS tablet Take 500 mg by mouth daily      albuterol sulfate HFA (PROVENTIL HFA) 108 (90 Base) MCG/ACT inhaler Inhale 2 puffs into the lungs every 6 hours as needed for Wheezing or Shortness of Breath 1 Inhaler 0    fluticasone (FLONASE) 50 MCG/ACT nasal spray 2 sprays to each nostril once daily. 1 Bottle 11    aspirin 81 MG tablet Take 81 mg by mouth daily.  Vitamin D (CHOLECALCIFEROL) 1000 UNITS CAPS capsule Take 3,000 Units by mouth daily       Multiple Vitamin (MULTI-VITAMINS PO) Take 1 tablet by mouth Daily. She is allergic to sulfamethoxazole-trimethoprim and penicillins. She  reports that she has never smoked. She has never used smokeless tobacco.      Objective:    Vitals:    04/29/22 0948   BP: 124/70   Site: Right Upper Arm   Position: Sitting   Cuff Size: Large Adult   Pulse: 67   Temp: 97.1 °F (36.2 °C)   TempSrc: Temporal   SpO2: 99%   Weight: 193 lb 12.8 oz (87.9 kg)   Height: 5' 1\" (1.549 m)     Body mass index is 36.62 kg/m². Wt Readings from Last 6 Encounters:   04/29/22 193 lb 12.8 oz (87.9 kg)   01/31/22 193 lb (87.5 kg)   01/17/22 196 lb (88.9 kg)   12/17/21 194 lb 6.4 oz (88.2 kg)   10/27/21 189 lb 9.6 oz (86 kg)   06/11/21 189 lb 3.2 oz (85.8 kg)         Obese female, healthy-appearing, alert, cooperative and in no acute distress. Neck supple. No adenopathy. Thyroid symmetric, normal size. Chest:  Normal expansion. Clear to auscultation. No rales, rhonchi, or wheezing. Respirations are not labored.    Heart sounds are normal.  Regular rate and rhythm without murmur, gallop or rub.  Lower extremities have no edema. She has had no recent labs.       (Please note that portions of this note were completed with a voice-recognition program. Efforts were made to edit the dictation but occasionally words are mis-transcribed.)

## 2022-04-29 NOTE — PROGRESS NOTES
Patient reports that her order for Metoprolol Tartrate was 25mg 1/2 tab twice a day when started, but it is now Metoprolol Tartrate 25mg 1 tab twice. Wants to verify dose. Encouraged to verify dose with cardiology. Patient is considering getting the covid booster. Plans to get at [facility]. Agreeable to mammogram.   TSH/T4 pended. Patient did not have labs done prior to appt and is not fasting at this time.

## 2022-04-30 ENCOUNTER — HOSPITAL ENCOUNTER (OUTPATIENT)
Dept: LAB | Age: 72
Discharge: HOME | End: 2022-04-30
Payer: MEDICARE

## 2022-04-30 DIAGNOSIS — E03.9 ACQUIRED HYPOTHYROIDISM: ICD-10-CM

## 2022-04-30 DIAGNOSIS — R73.01 ELEVATED FASTING GLUCOSE: ICD-10-CM

## 2022-04-30 DIAGNOSIS — E55.9 VITAMIN D DEFICIENCY: ICD-10-CM

## 2022-04-30 DIAGNOSIS — E78.2 MIXED HYPERLIPIDEMIA: ICD-10-CM

## 2022-04-30 LAB
ALBUMIN SERPL-MCNC: 4.2 G/DL (ref 3.5–5.2)
ALBUMIN/GLOBULIN RATIO: 1.3 (ref 1–2.5)
ALP BLD-CCNC: 93 U/L (ref 35–104)
ALT SERPL-CCNC: 13 U/L (ref 5–33)
ANION GAP SERPL CALCULATED.3IONS-SCNC: 8 MMOL/L (ref 9–17)
AST SERPL-CCNC: 16 U/L
BILIRUB SERPL-MCNC: 1.21 MG/DL (ref 0.3–1.2)
BUN BLDV-MCNC: 17 MG/DL (ref 8–23)
BUN/CREAT BLD: 25 (ref 9–20)
CALCIUM SERPL-MCNC: 9.5 MG/DL (ref 8.6–10.4)
CHLORIDE BLD-SCNC: 104 MMOL/L (ref 98–107)
CHOLESTEROL/HDL RATIO: 3.6
CHOLESTEROL: 172 MG/DL
CO2: 29 MMOL/L (ref 20–31)
CREAT SERPL-MCNC: 0.69 MG/DL (ref 0.5–0.9)
GFR AFRICAN AMERICAN: >60 ML/MIN
GFR NON-AFRICAN AMERICAN: >60 ML/MIN
GFR SERPL CREATININE-BSD FRML MDRD: ABNORMAL ML/MIN/{1.73_M2}
GLUCOSE BLD-MCNC: 106 MG/DL (ref 70–99)
HDLC SERPL-MCNC: 48 MG/DL
LDL CHOLESTEROL: 95 MG/DL (ref 0–130)
POTASSIUM SERPL-SCNC: 4.1 MMOL/L (ref 3.7–5.3)
SODIUM BLD-SCNC: 141 MMOL/L (ref 135–144)
THYROXINE, FREE: 1.51 NG/DL (ref 0.93–1.7)
TOTAL PROTEIN: 7.5 G/DL (ref 6.4–8.3)
TRIGL SERPL-MCNC: 144 MG/DL
TSH SERPL DL<=0.05 MIU/L-ACNC: 2.55 UIU/ML (ref 0.3–5)
VITAMIN D 25-HYDROXY: 42.5 NG/ML

## 2022-04-30 PROCEDURE — 84439 ASSAY OF FREE THYROXINE: CPT

## 2022-04-30 PROCEDURE — 84443 ASSAY THYROID STIM HORMONE: CPT

## 2022-04-30 PROCEDURE — 36415 COLL VENOUS BLD VENIPUNCTURE: CPT

## 2022-04-30 PROCEDURE — 80053 COMPREHEN METABOLIC PANEL: CPT

## 2022-04-30 PROCEDURE — 83036 HEMOGLOBIN GLYCOSYLATED A1C: CPT

## 2022-04-30 PROCEDURE — 80061 LIPID PANEL: CPT

## 2022-04-30 PROCEDURE — 82306 VITAMIN D 25 HYDROXY: CPT

## 2022-05-01 LAB
ESTIMATED AVERAGE GLUCOSE: 126 MG/DL
HBA1C MFR BLD: 6 % (ref 4–6)

## 2022-05-02 DIAGNOSIS — R73.01 ELEVATED FASTING GLUCOSE: Primary | ICD-10-CM

## 2022-05-02 DIAGNOSIS — E78.2 MIXED HYPERLIPIDEMIA: ICD-10-CM

## 2022-06-02 ENCOUNTER — TELEPHONE (OUTPATIENT)
Dept: CARDIOLOGY | Age: 72
End: 2022-06-02

## 2022-06-02 DIAGNOSIS — G25.81 RLS (RESTLESS LEGS SYNDROME): ICD-10-CM

## 2022-06-02 RX ORDER — ROPINIROLE 1 MG/1
TABLET, FILM COATED ORAL
Qty: 90 TABLET | Refills: 1 | Status: SHIPPED | OUTPATIENT
Start: 2022-06-02

## 2022-06-02 NOTE — TELEPHONE ENCOUNTER
Last Appt:   12/17/2021   Next Appt:   6/29/2022  Med verified in Epic    Patient needs refill on requip

## 2022-06-02 NOTE — TELEPHONE ENCOUNTER
Pt called to ask about metoprolol prescription. The prescription bottle states 25 mg 1 tab BID. Pt has been taking 12.5 mg BID for years and she is controled on this dose, BP runs between 120s/60-70s w/ hr in the 70s. Pt has a note in from 4/14/2020 on the same thing and was told to continue the 12.5 mg. Please change in the system. Pt will wait for call.     [phone]    Last Appt:  1/17/2022  Next Appt:  1/2023

## 2022-06-02 NOTE — TELEPHONE ENCOUNTER
Left message for patient of medication dose adjustment in chart and to call office with any questions.

## 2022-06-07 DIAGNOSIS — I63.9 CEREBROVASCULAR ACCIDENT (CVA), UNSPECIFIED MECHANISM (HCC): ICD-10-CM

## 2022-06-07 DIAGNOSIS — G45.9 TIA (TRANSIENT ISCHEMIC ATTACK): Primary | ICD-10-CM

## 2022-06-07 RX ORDER — CLOPIDOGREL BISULFATE 75 MG/1
TABLET ORAL
Qty: 90 TABLET | Refills: 1 | Status: SHIPPED | OUTPATIENT
Start: 2022-06-07

## 2022-06-29 ENCOUNTER — OFFICE VISIT (OUTPATIENT)
Dept: NEUROLOGY | Age: 72
End: 2022-06-29
Payer: MEDICARE

## 2022-06-29 VITALS
DIASTOLIC BLOOD PRESSURE: 78 MMHG | SYSTOLIC BLOOD PRESSURE: 110 MMHG | HEIGHT: 61 IN | WEIGHT: 191.8 LBS | OXYGEN SATURATION: 95 % | HEART RATE: 77 BPM | BODY MASS INDEX: 36.21 KG/M2

## 2022-06-29 DIAGNOSIS — G57.12 MERALGIA PARESTHETICA OF LEFT SIDE: ICD-10-CM

## 2022-06-29 DIAGNOSIS — I69.90 LATE EFFECTS OF CVA (CEREBROVASCULAR ACCIDENT): Primary | ICD-10-CM

## 2022-06-29 DIAGNOSIS — I63.00 CEREBRAL INFARCTION DUE TO THROMBOSIS OF PRECEREBRAL ARTERY (HCC): ICD-10-CM

## 2022-06-29 DIAGNOSIS — Z86.73 H/O TIA (TRANSIENT ISCHEMIC ATTACK) AND STROKE: ICD-10-CM

## 2022-06-29 DIAGNOSIS — G25.81 RLS (RESTLESS LEGS SYNDROME): ICD-10-CM

## 2022-06-29 DIAGNOSIS — R42 DIZZINESS: ICD-10-CM

## 2022-06-29 DIAGNOSIS — G45.0 VBI (VERTEBROBASILAR INSUFFICIENCY): ICD-10-CM

## 2022-06-29 DIAGNOSIS — E03.9 ACQUIRED HYPOTHYROIDISM: ICD-10-CM

## 2022-06-29 DIAGNOSIS — R41.3 MEMORY PROBLEM: ICD-10-CM

## 2022-06-29 PROCEDURE — G8417 CALC BMI ABV UP PARAM F/U: HCPCS | Performed by: PSYCHIATRY & NEUROLOGY

## 2022-06-29 PROCEDURE — 1090F PRES/ABSN URINE INCON ASSESS: CPT | Performed by: PSYCHIATRY & NEUROLOGY

## 2022-06-29 PROCEDURE — 1123F ACP DISCUSS/DSCN MKR DOCD: CPT | Performed by: PSYCHIATRY & NEUROLOGY

## 2022-06-29 PROCEDURE — 1036F TOBACCO NON-USER: CPT | Performed by: PSYCHIATRY & NEUROLOGY

## 2022-06-29 PROCEDURE — 99213 OFFICE O/P EST LOW 20 MIN: CPT | Performed by: PSYCHIATRY & NEUROLOGY

## 2022-06-29 PROCEDURE — 99214 OFFICE O/P EST MOD 30 MIN: CPT | Performed by: PSYCHIATRY & NEUROLOGY

## 2022-06-29 PROCEDURE — G8427 DOCREV CUR MEDS BY ELIG CLIN: HCPCS | Performed by: PSYCHIATRY & NEUROLOGY

## 2022-06-29 PROCEDURE — 3017F COLORECTAL CA SCREEN DOC REV: CPT | Performed by: PSYCHIATRY & NEUROLOGY

## 2022-06-29 PROCEDURE — G8399 PT W/DXA RESULTS DOCUMENT: HCPCS | Performed by: PSYCHIATRY & NEUROLOGY

## 2022-06-29 ASSESSMENT — ENCOUNTER SYMPTOMS
EYE REDNESS: 0
CHOKING: 0
ABDOMINAL DISTENTION: 0
TROUBLE SWALLOWING: 0
EYE DISCHARGE: 0
COLOR CHANGE: 0
COUGH: 0
APNEA: 0
NAUSEA: 0
PHOTOPHOBIA: 0
SINUS PRESSURE: 0
VISUAL CHANGE: 0
SWOLLEN GLANDS: 0
VOICE CHANGE: 0
BLOOD IN STOOL: 0
EYE ITCHING: 0
ABDOMINAL PAIN: 0
WHEEZING: 0
SORE THROAT: 0
CONSTIPATION: 0
BOWEL INCONTINENCE: 0
VOMITING: 0
CHEST TIGHTNESS: 0
BACK PAIN: 0
SHORTNESS OF BREATH: 0
EYE PAIN: 0
FACIAL SWELLING: 0
DIARRHEA: 0
CHANGE IN BOWEL HABIT: 0

## 2022-06-29 NOTE — PROGRESS NOTES
Subjective:        Patient ID: Vianney Martin is a 66 y.o. RIGHT   HANDED female. Cerebrovascular Accident  This is a chronic problem. Episode onset: TIA  IN  2008   AND  CVA  IN  2014. The problem occurs rarely. The problem has been resolved. Pertinent negatives include no abdominal pain, anorexia, arthralgias, change in bowel habit, chest pain, chills, congestion, coughing, diaphoresis, fatigue, fever, headaches, joint swelling, myalgias, nausea, neck pain, numbness, rash, sore throat, swollen glands, urinary symptoms, vertigo, visual change, vomiting or weakness. Nothing aggravates the symptoms. Treatments tried: ASA,  PLAVIX. The treatment provided significant relief. Dizziness  This is a chronic problem. Episode onset: MORE  THAN   2 YEARS. The problem occurs intermittently. The problem has been resolved. Pertinent negatives include no abdominal pain, anorexia, arthralgias, change in bowel habit, chest pain, chills, congestion, coughing, diaphoresis, fatigue, fever, headaches, joint swelling, myalgias, nausea, neck pain, numbness, rash, sore throat, swollen glands, urinary symptoms, vertigo, visual change, vomiting or weakness. Nothing aggravates the symptoms. She has tried nothing for the symptoms. The treatment provided significant relief. Neurologic Problem  The patient's pertinent negatives include no altered mental status, clumsiness, focal sensory loss, focal weakness, loss of balance, memory loss, near-syncope, slurred speech, syncope, visual change or weakness. Primary symptoms comment: MILD SHORT  TERM  MEMORY  PROBLEMS  . This is a chronic problem. The current episode started more than 1 year ago. The neurological problem developed insidiously. The problem is unchanged. There was no focality noted. Associated symptoms include dizziness.  Pertinent negatives include no abdominal pain, auditory change, aura, back pain, bladder incontinence, bowel incontinence, chest pain, confusion, diaphoresis, fatigue, fever, headaches, light-headedness, nausea, neck pain, palpitations, shortness of breath, vertigo or vomiting. Past treatments include nothing. The treatment provided no relief. Her past medical history is significant for a CVA. There is no history of a bleeding disorder, a clotting disorder, dementia, head trauma, liver disease, mood changes or seizures. History obtained from  The patient     and other  available medical records were  Also  reviewed. 1)     PREVIOUS     H/O      TIA    WITH  MEMORY  LOSS                      AND  CONFUSION     IN      2008                      -   RESOLVED              2)    PREVIOUS     H/O       STROKE    IN   January 2014. H/O   INFARCT  IN MEDIAL   TEMPORAL  LOBE                 PREVIOUS    H/O      CONFUSION  AND  MEMORY  LOSS                        FOR   5  HOURS  AT  THAT  TIME.                                -   RESOLVED               3)     ON    ASA   AND  PLAVIX    AND  TOLERATING  THE  SAME. NO  RECURRENCE   OF  TIA OR  CVA            4)        H/O     RESTLESS  LEG  SYNDROME                   FOR  MORE  THAN    30  YEARS             -      BETTER  CONTROLLED   ON  REQUIP               5)     NO   H/O  CLINICAL  SEIZURE  ACTIVITY. NO  RECURRENCE  OF  MEMORY  LOSS                             OR  CONFUSION. 6)       NO   EVIDENCE  OF  PARKINSONIAN  FEATURES                    PATIENT  DENIES  ANY  GAIT  DIFFICULTIES                      OR  BALANCE  PROBLEMS          7)           MERALGIA   PARESTHETICA  ON   THE  LEFT  THIGH                       -   STABLE          8)      MULTIPLE   CO  MORBID  MEDICAL  CONDITIONS. BEING  FOLLOWED  BY  HER  PCP. 9)      MILD  SHORT TERM  MEMORY PROBLEMS                         --  FOR    3  YEARS                 PATIENT  NOT  CONCERNED.                   PATIENT  NOT  INTERESTED  IN  MEDICATION FOR  DEMENTIA                  PATIENT  COULD BE  TRIED  WITH  MEDICATION,                 IF  PATIENT  IS  WILLING  FOR  THE  SAME. 10)         PREVIOUS     H/O    DIZZINESS                    -   NO  RECURRENCE            11)       CAROTID  DOPPLER,  TCD  IN  JAN. 2019      SHOWED                            NO  SIGNIFICANT  ABNORMALITIES                 12)      PATIENT  AWARE  OF  VARIOUS  RISK  FACTORS  FOR  TIA  /  STROKE                      REVIEWED,  DISCUSSED   WITH  PATIENT  IN  DETAIL. PATIENT  TOLERATING  ASA  81   MG  AND PLAVIX   75   MG  PO  DAILY                               WITH   OUT  ANY   SIDE  EFFECTS      AND  GETTING  BENEFIT. 13)       PATIENT  DENIES    ANY    RECURRENCE  OF  TIA /   STROKE                           OR  DIZZINESS. PATIENT  DENIES    ANY  NEW  NEUROLOGICAL   CONCERNS. The  Duration,  Quality,  Severity,  Location,  Timing,  Context,  Modifying  Factors   Of   The   Chief   Complaint       And  Present  Illness   Was   Reviewed   In   Chronological   [facility]anner.              Patient   Indicates   The  Presence   And  The  Absence  Of  The  Following  Associated         And   Additional  Neurological    Symptoms:                                Balance  And coordination problems  absent           Gait problems     absent            Headaches      absent              Migraines           absent           Memory problems        PRESENT               Confusion        absent            Paresthesia numbness          absent           Seizures  And  Starring  Episodes           absent           Syncope,  Near  syncopal episodes         absent           Speech problems           absent             Swallowing  Problems      absent            Dizziness,  Light headedness           absent                        Vertigo        absent             Generalized   Weakness    absent              focal  Weakness absent             Tremors         absent              Sleep  Problems     absent             History  Of   Recent   Head  Injury     absent             History  Of   Recent  TIA     absent             History  Of   Recent    Stroke     absent             Neck  Pain and  Neck muscle  Spasms  Absent               Radiating  down   And   Weakness           absent            Lower back   Pain  And     Spasms  Absent              Radiating    Down   And   Weakness          absent                H/O   FALLS        absent               History  Of   Visual  Symptoms    Absent                  Associated   Diplopia       absent                                                                    Also   Additional   Symptoms   Present    As  Documented    In   The detailed      Review  Of  Systems   And    Please   Refer   To    Them for   Additional  Information. Any components  That are either  Unobtainable  Or  Limited  In   HPI, ROS  And/or PFSH       Are   Due   To   Patient's  Medical  Problems,  Clinical  Condition and/or lack of other  Alternate resources.             RECORDS   REVIEWED:    historical medical records, lab reports, office notes and radiology reports         INFORMATION   REVIEWED:     MEDICAL   HISTORY,     SURGICAL   HISTORY,   MEDICATIONS   LIST,   ALLERGIES AND  DRUG  INTOLERANCES,     FAMILY   HISTORY,  SOCIAL  HISTORY,    PROBLEM  LIST   FOR  PATIENT  CARE   COORDINATION           Past Medical History:   Diagnosis Date    Acute confusion     CVA (cerebral infarction)     Hyperlipidemia     Hypothyroidism     [facility] ([facility] [address]     Memory loss due to medical condition     Meralgia paresthetica     left side    Myopia with astigmatism and presbyopia     Perennial allergic rhinitis     RLS (restless legs syndrome)     TIA (transient ischemic attack) 2008, 1/06/2014    with brief confusion    VBI (vertebrobasilar insufficiency)                   Past Surgical History:   Procedure Laterality Date    CARD[facility]AC [facility]ATION  9/2014    [facility]   nl cath    CATARACT REMOVAL Bilateral 2020    COLONOSCOPY  02/25/2015    mild diverticulosis:  Kari BERNARD    INSERTABLE CARDIAC MONITOR  4/22/16    removed    INTRACAPSULAR CATARACT EXTRACTION Right 9/29/2020    Right Cataract Extraction w/ IOL Implant performed by Radha Ellis MD at [place] [address] Left 10/27/2020    Left Cataract Extraction w/ IOL Implant performed by Radha Ellis MD at [address] [place] [address] PRE-MALIGNANT / [address]      hemangioma removal on right foot 2nd toe    WISDOM TOOTH EXTRACTION  1981                 Current Outpatient Medications   Medication Sig Dispense Refill    clopidogrel (PLAVIX) 75 MG tablet take 1 tablet by mouth once daily 90 tablet 1    rOPINIRole (REQUIP) 1 MG tablet take 1 tablet by mouth at bedtime 90 tablet 1    metoprolol tartrate (LOPRESSOR) 25 MG tablet Take 12.5 mg by mouth 2 times daily      levothyroxine (SYNTHROID) 125 MCG tablet take 1 tablet by mouth once daily 90 tablet 1    nystatin (MYCOSTATIN) 810537 UNIT/GM powder Apply 3 times daily. 1 each 2    atorvastatin (LIPITOR) 10 MG tablet take 1 tablet by mouth once daily 90 tablet 2    clotrimazole-betamethasone (LOTRISONE) 1-0.05 % cream Apply topically 2 times daily prn. 30 g 2    calcium carbonate (OSCAL) 500 MG TABS tablet Take 500 mg by mouth daily      aspirin 81 MG tablet Take 81 mg by mouth daily.  Vitamin D (CHOLECALCIFEROL) 1000 UNITS CAPS capsule Take 3,000 Units by mouth daily       Multiple Vitamin (MULTI-VITAMINS PO) Take 1 tablet by mouth Daily.  fluticasone (FLONASE) 50 MCG/ACT nasal spray 2 sprays to each nostril once daily. (Patient not taking: Reported on 6/29/2022) 1 Bottle 11     No current facility-administered medications for this visit.              Allergies   Allergen Reactions    Sulfamethoxazole-Trimethoprim Swelling     lips    Penicillins Rash               Family History   Problem Relation Age of Onset   Rebecca Jacinto Stroke Mother     Other Mother          from pneumonia    Liver Disease Father         due to alcohol    Heart Disease Father         valve disease    Diabetes Father     Thyroid Disease Sister     Cancer Sister         breast cancer older sister   Rebecca Jacinto Breast Cancer Sister     Diabetes Brother     Breast Cancer Maternal Grandmother     High Cholesterol Sister     Diabetes Brother     Other Paternal Aunt         late onset alzheimers    Glaucoma Neg Hx     Cataracts Neg Hx              Social History     Socioeconomic History    Marital status:      Spouse name: Not on file    Number of children: Not on file    Years of education: Not on file    Highest education level: Not on file   Occupational History    Occupation: retired    Tobacco Use    Smoking status: Never Smoker    Smokeless tobacco: Never Used   Vaping Use    Vaping Use: Never used   Substance and Sexual Activity    Alcohol use: No    Drug use: No    Sexual activity: Not Currently     Partners: Male   Other Topics Concern    Not on file   Social History Narrative    Not on file     Social Determinants of Health     Financial Resource Strain: Unknown    Difficulty of Paying Living Expenses: Patient refused   Food Insecurity: Unknown    Worried About Running Out of Food in the Last Year: Patient refused    [address] in the Last Year: Patient refused   Transportation Needs:     Lack of Transportation (Medical): Not on file    Lack of Transportation (Non-Medical):  Not on file   Physical Activity:     Days of Exercise per Week: Not on file    Minutes of Exercise per Session: Not on file   Stress:     Feeling of Stress : Not on file   Social Connections:     Frequency of Communication with Friends and Family: Not on file    Frequency of Social Gatherings with Friends and Family: Not on file    Attends Nondenominational Services: Not on file    Active Member of Clubs or Organizations: Not on file    Attends Club or Organization Meetings: Not on file    Marital Status: Not on file   Intimate Partner Violence:     Fear of Current or Ex-Partner: Not on file    Emotionally Abused: Not on file    Physically Abused: Not on file    Sexually Abused: Not on file   Housing Stability:     Unable to Pay for Housing in the Last Year: Not on file    Number of Places Lived in the Last Year: Not on file    Unstable Housing in the Last Year: Not on file       Vitals:    06/29/22 0901   BP: 110/78   Pulse: 77   SpO2: 95%         Wt Readings from Last 3 Encounters:   06/29/22 191 lb 12.8 oz (87 kg)   04/29/22 193 lb 12.8 oz (87.9 kg)   01/31/22 193 lb (87.5 kg)         BP Readings from Last 3 Encounters:   06/29/22 110/78   04/29/22 124/70   01/31/22 112/80       Hematology and Coagulation  Lab Results   Component Value Date    WBC 8.2 02/11/2020    RBC 5.15 02/11/2020    HGB 14.1 02/11/2020    HCT 44.3 02/11/2020    MCV 86.0 02/11/2020    MCH 27.4 02/11/2020    MCHC 31.8 02/11/2020    RDW 12.5 02/11/2020     02/11/2020    MPV 9.8 02/11/2020       Chemistries  Lab Results   Component Value Date     04/30/2022    K 4.1 04/30/2022     04/30/2022    CO2 29 04/30/2022    BUN 17 04/30/2022    CREATININE 0.69 04/30/2022    CALCIUM 9.5 04/30/2022    PROT 7.5 04/30/2022    LABALBU 4.2 04/30/2022    BILITOT 1.21 04/30/2022    ALKPHOS 93 04/30/2022    AST 16 04/30/2022    ALT 13 04/30/2022     Lab Results   Component Value Date    ALKPHOS 93 04/30/2022    ALT 13 04/30/2022    AST 16 04/30/2022    PROT 7.5 04/30/2022    BILITOT 1.21 04/30/2022    LABALBU 4.2 04/30/2022     Lab Results   Component Value Date    BUN 17 04/30/2022    CREATININE 0.69 04/30/2022     Lab Results   Component Value Date    CALCIUM 9.5 04/30/2022    MG 2.1 01/06/2014     Lab Results   Component Value Date    AST 16 04/30/2022    ALT 13 04/30/2022 Lab Results   Component Value Date    CKTOTAL 59 11/08/2014                 Review of Systems   Constitutional: Negative for appetite change, chills, diaphoresis, fatigue, fever and unexpected weight change. HENT: Negative for congestion, dental problem, drooling, ear discharge, ear pain, facial swelling, hearing loss, mouth sores, nosebleeds, postnasal drip, sinus pressure, sore throat, tinnitus, trouble swallowing and voice change. Eyes: Negative for photophobia, pain, discharge, redness, itching and visual disturbance. Respiratory: Negative for apnea, cough, choking, chest tightness, shortness of breath and wheezing. Cardiovascular: Negative for chest pain, palpitations, leg swelling and near-syncope. Gastrointestinal: Negative for abdominal distention, abdominal pain, anorexia, blood in stool, bowel incontinence, change in bowel habit, constipation, diarrhea, nausea and vomiting. Endocrine: Negative for cold intolerance, heat intolerance, polydipsia, polyphagia and polyuria. Genitourinary: Negative for bladder incontinence. Musculoskeletal: Negative for arthralgias, back pain, gait problem, joint swelling, myalgias, neck pain and neck stiffness. Skin: Negative for color change, pallor, rash and wound. Allergic/Immunologic: Negative for environmental allergies, food allergies and immunocompromised state. Neurological: Positive for dizziness. Negative for vertigo, tremors, focal weakness, seizures, syncope, facial asymmetry, speech difficulty, weakness, light-headedness, numbness, headaches and loss of balance. Hematological: Negative for adenopathy. Does not bruise/bleed easily. Psychiatric/Behavioral: Positive for decreased concentration. Negative for agitation, behavioral problems, confusion, dysphoric mood, hallucinations, memory loss, self-injury, sleep disturbance and suicidal ideas. The patient is not nervous/anxious and is not hyperactive.               Objective: Physical Exam  Constitutional:       Appearance: She is well-developed. HENT:      Head: Normocephalic and atraumatic. No raccoon eyes or Abernathy's sign. Right Ear: External ear normal.      Left Ear: External ear normal.      Nose: Nose normal.   Eyes:      Conjunctiva/sclera: Conjunctivae normal.      Pupils: Pupils are equal, round, and reactive to light. Neck:      Thyroid: No thyroid mass or thyromegaly. Vascular: No carotid bruit. Trachea: No tracheal deviation. Meningeal: Brudzinski's sign and Kernig's sign absent. Cardiovascular:      Rate and Rhythm: Normal rate and regular rhythm. Pulmonary:      Effort: Pulmonary effort is normal.   Musculoskeletal:         General: No tenderness. Normal range of motion. Cervical back: Normal range of motion and neck supple. No rigidity. No muscular tenderness. Normal range of motion. Skin:     General: Skin is warm. Coloration: Skin is not pale. Findings: No erythema or rash. Nails: There is no clubbing. Psychiatric:         Attention and Perception: She is attentive. Mood and Affect: Mood is not anxious or depressed. Affect is not labile, blunt or inappropriate. Speech: She is communicative. Speech is not rapid and pressured, delayed, slurred or tangential.         Behavior: Behavior is not agitated, slowed, aggressive, withdrawn, hyperactive or combative. Behavior is cooperative. Thought Content: Thought content is not paranoid or delusional. Thought content does not include homicidal or suicidal ideation. Thought content does not include homicidal or suicidal plan. Cognition and Memory: Cognition is impaired. Memory is impaired. She does not exhibit impaired recent memory or impaired remote memory. Judgment: Judgment is not impulsive or inappropriate. NEUROLOGICAL EXAMINATION :    A) MENTAL STATUS:                   Alert and  oriented  To time, place  And  Person. No Aphasia. No  Dysarthria. Able   To  Follow     SIMPLE   commands without   Any  Difficulty. No right  To left confusion. Normal  Speech  And language function. Insight and  Judgment ,Fund  Of  Knowledge   within normal limits                Recent  And  Remote memory   DECREASED                  Attention &Concentration are    DECREASED                                                 B) CRANIAL NERVES :             2 CN : Visual  Acuity  And  Visual fields  within normal limits                        Fundi  Could  Not  Be  Could  Not  Be  Evaluated. 3,4,6 CN : Both  Pupils are   Reactive and  Equal.                            Extraocular   Movements  Are  Intact. No  Nystagmus. No  TESSA. No  Afferent  Pupillary  Defect noted. 5 CN :  Normal  Facial sensations and Corneal  Reflexes           7 CN :  Normal  Facial  Symmetry  And  Strength. No facial  Weakness. 8 CN :  Hearing  Appears subnormal          9, 10 CN: Normal spontaneous, reflex palate movements         11 CN:   Normal  Shoulder shrug and  Strength         12 CN :   Normal  Tongue movements and  Tongue  In midline                        No tongue   Fasciculations or atrophy         C) MOTOR  EXAM:                 Strength  In upper  And  Lower extremities   within normal limits                     Rapid alternating  And  repetitions  Movements  within normal limits               Muscle  Tone  In upper  And  Lower  Extremities  within normal limits                No rigidity. No  Spasticity. Bradykinesia   absent               No  Asterixis.               Sustention  Tremor , Resting  Tremor   absent                    No other  Abnormal  Movements noted             D) SENSORY :               light touch, pinprick, position  And  Vibration  DECREASED        E) REFLEXES:                   Deep Tendon  Reflexes within normal limits                    No pathological  Reflexes  Bilaterally.                                   F) COORDINATION  AND  GAIT :                                Station and  Gait  within normal limits                                        Romberg's test negative                          Ataxia negative        Assessment:           Patient Active Problem List   Diagnosis    Hypothyroidism    Hyperlipidemia    [address]erebral infarction ([address]    Memory loss due to medical condition    TIA (transient ischemic attack)    Meralgia paresthetica    Cataract    Myopia of both eyes with astigmatism and presbyopia    RLS (restless legs syndrome)    Myopia of both eyes    [address]unar infarction ([address]    CVA (cerebral vascular accident) ([address]    VBI (vertebrobasilar insufficiency)    JOHNATHAN due to [address] Mixed hyperlipidemia    Cerebrovascular accident (CVA) due to thrombosis of precerebral artery ([address]    Cerebral infarction due to thrombosis of vertebral artery ([address]    Late effects of CVA (cerebrovascular accident)    Other hyperlipidemia    Dizziness    Memory problem           ULTRASOUND EVALUATION OF THE CAROTID ARTERIES       11/20/2017       COMPARISON:   4 December 2015       HISTORY:   ORDERING SYSTEM PROVIDED HISTORY: Lacunar infarction ([address]       FINDINGS:   RIGHT:       The right common carotid artery demonstrates peak systolic velocities of 85,   91 cm/sec in the proximal and distal segments respectively.       The right internal carotid artery demonstrates the systolic velocities of 73,   80, 89 cm/sec in the proximal, mid and distal segments respectively.       The external carotid artery is patent.  The vertebral artery demonstrates   normal antegrade flow.       No evidence of focal atherosclerotic plaque.       ICA/CCA ratio of 1.0.       LEFT:       The left common carotid artery demonstrates peak systolic velocities of 92,   80 80 cm/sec in the proximal and distal segments respectively.       The left internal carotid artery demonstrates the systolic velocities of 63,   94, 90 cm/sec in the proximal, mid and distal segments respectively.       The external carotid artery is patent.  The vertebral artery demonstrates   normal antegrade flow.       No evidence of focal atherosclerotic plaque.       ICA/CCA ratio of 1.0.           Impression   The right internal carotid artery demonstrates 0-50% stenosis .       The left internal carotid artery demonstrates 0-50% stenosis .       Bilateral vertebral arteries are patent with flow in the normal direction.       There is no significant change from prior exam.  No significant plaque   formation is noted. Chloe Cheeks is no evidence of hemodynamically significant   stenosis.               Bilateral carotid and vertebral arteries Doppler ultrasound is performed using grayscale, color Doppler and pulse-wave Doppler. Indication: 57-year-old female with a history of cerebral infarction, meralgia paresthetica, memory loss, hyperlipidemia, hypothyroidism, no vertebral basilar insufficiency and history of prior TIAs. Comparison: Prior carotid ultrasound from 2/16/09. Findings: Mild intimal thickening and slight mostly non-echogenic plaque identified. Left carotid artery:      1. The maximum peak systolic velocity in the proximal CCA is 92 cm/s, mid 66cm/s and distal 65cm/s. There is no diameter reduction. 2. The maximum peak systolic velocity in the proximal ICA 55cm/s, mid ICA 69 cm/s and distal ICA 114cm/s. There is no diameter reduction at the proximal ICA. 3. The maximum peak systolic velocity in the left external carotid artery is 110 cm/s. ICA/CCA =0.8. Right carotid artery:      1. The maximum peak systolic velocity in the proximal CCA is 93 cm/s, mid 64 cm/s and distal 65 cm/s. There is no diameter reduction at the right carotid bulb.       2. The maximum peak systolic velocity in the proximal [id number]/s, mid ICA 64 cm/s and distal ICA 68cm/s. There is no diameter reduction at the proximal ICA. 3. The maximum peak systolic velocity in the left external carotid artery is 64 cm/s. ICA/CCA= 1.2. There is evidence of bilateral antegrade flow in the vertebral arteries. Impression: No carotid artery stenosis or significant plaque disease. Stable examination compared to 2009. Final report electronically signed by Cullman DOMO Yanez on 12/4/2015        . Plan:          VISITING DIAGNOSIS:      ICD-10-CM    1. Late effects of CVA (cerebrovascular accident)  I69.90    2. RLS (restless legs syndrome)  G25.81    3. Dizziness  R42    4. Acquired hypothyroidism  E03.9    5. Cerebral infarction due to thrombosis of precerebral artery (HCC)  I63.00    6. VBI (vertebrobasilar insufficiency)  G45.0    7. Meralgia paresthetica of left side  G57.12    8. H/O TIA (transient ischemic attack) and stroke  Z86.73    9. Memory problem  R41.3               CONCERNS   &   INCREASED   RISK   FOR         * TIA,  CEREBRO  VASCULAR  ISCHEMIA, STROKE       *   COGNITIVE  &   MEMORY PROBLEMS  AND  DEMENTIA                 VARIOUS  RISK   FACTORS   WERE  REVIEWED   AND   DISCUSSED. PLAN:       Jakub Hoes  Of  The  Diagnoses,  The  Management & Treatment  Options           AND    Care  plan  Were        Reviewed and   Discussed   With  patient. * Goals  And  Expectations  Of  The  Therapy  Discussed   And  Reviewed. *   Benefits   And   Side  Effect  Profile  Of  Medication/s   Were   Discussed             * Need   For  Further   Follow up For  The  Various  Problems  Were discussed. * Results  Of  The  Previous  Diagnostic tests were reviewed and questions answered. patient  understand the same.              Medical  Decision  Making  Was  Made  Based on the   Complexity  Of  Above  Mentioned  Diagnoses,        Data reviewed   & diagnostic  Tests  Reviewed,  Risk  Of Significant   Co morbidities and complicating   Factors. Medical  Decision  Was   High  Complexity  Due   To  The  Patient's  Multiple  Symptoms,  Advancing   Disease,      Complex  Treatment  Regimen,  Multiple medications and   Risk  Of   Side  Effects,  Difficulty  In  Medication  Management      And  Diagnostic  Challenges   In  View  Of  The  Associated   Co  Morbid  Conditions   And  Problems. * FALL   PRECAUTIONS. *   BE  CAREFUL  WITH  ACTIVITIES         *   ADEQUATE   FLUID  INTAKE   AND  ELECTROLYTE  BALANCE         * KEEP  DAIRY  OF   THE  NEUROLOGICAL  SYMPTOMS        RECORDING THE    DURATION  AND  FREQUENCY. *  AVOID    CONDITIONS  AND  FACTORS   THAT  MAKE   NEUROLOGICAL  SYMPTOMS  WORSE. *  TO  MAINTAIN  REGULAR  SLEEP  WAKE  CYCLES. *   TO  HAVE  ADEQUATE  REST  AND   SLEEP    HOURS.          *    TO   AVOID   TO  SLEEP  IN   SUPINE  POSITION. *      WEIGHT   LOSS. *    AVOID  ANY USAGE OF                   TOBACCO,  EXCESSIVE  ALCOHOL  AND   ILLEGAL   SUBSTANCES          *  CONTINUE MEDICATIONS PRESCRIBED BY NEUROLOGIST AS    RECOMMENDED     *   Compliance   With  Medications   And  Instructions      * CURRENTLY  TOLERATING  THE  PRESCRIBED   MEDICATIONS. WITHOUT  ANY  SIGNIFICANT  SIDE  EFFECTS   &  GETTING BENEFIT. *  MILD  SHORT TERM  MEMORY PROBLEMS    FOR    2  YEARS              PATIENT  NOT  CONCERNED. * PATIENT  AWARE  OF  VARIOUS  RISK  FACTORS  FOR  TIA  /  STROKE                    SAME  REVIEWED,  DISCUSSED   WITH  PATIENT  IN  DETAIL.                 *  May   Use  Pill  Box,    If  Needed      *    To  Continue  The    Antiplatelet  therapy    As   Recommended  Was   Discussed              *    Prophylactic  Use   Of     Vitamin   B   Complex,  Folic  Acid,    Vitamin  B12    Multivitamin       Tablet  Daily    Supplementations   Over  The  Counter  Discussed           *  PATIENT IS  ALSO   COUNSELED   TO  KEEP    ACTIVITIES         A)   SIMPLE      B)  ORGANIZED      C)  WRITE   DOWN                              *PATIENT   TO  FOLLOW  UP  WITH   PRIMARY  CARE   AND   OTHER  CONSULTANTS  AS  BEFORE. *  Maintain   Healthy  Life Style    With   Periodic  Monitoring  Of      Any  Medical  Conditions  Including   Elevated  Blood  Pressure,  Lipid  Profile,     Blood  Sugar levels  And   Heart  Disease. *   Period   Screening  For  Cancers  Involving  Breast,  Colon,     lungs  And  Other  Organs  As  Applicable,  In consultation   With  Your  Primary Care Providers. * Second  Neurological  Opinion  And  Evaluations  In  [facility]  Setting  If  Patient  Is  Interested. *  If  The  Patient remains  Neurologically  Stable   Return   To  [facility] Neurology Department       IN     6    MONTHS  TIME   FOR  FURTHER  FOLLOW UP.                 *  If   There is  Any  Significant  Worsening   Of  Current  Symptoms  And  Or  If patient  Develops       Any additional  New  Neurological  Symptoms  Or  Significant  Concerns   Should  Call  911 or      Go  To  Emergency  Department  For  Further  Immediate  Evaluation. *   The  Neurological   Findings,  Possible  Diagnosis,  Differential diagnoses   And  Options      For    Further   Investigations   And  management   Are  Discussed  Comprehensively. Medications   And  Prescription   Risks  And  Side effects  Are   Also  Discussed. The  Above  Were  Reviewed  With  patient and     questions  Answered  In  Detail. More   Than   50% of face  To face Time   Was  Spent  On  Counseling   And   Coordination  Of  Care       Of   Patient's multiple   Neurological  Problems   And   Comorbid  Medical   Conditions.           Electronically signed by Eugenio Castañeda MD.,  [facility]        Board Certified in  Neurology &  In  [place] [address] [facility]   American Board of Psychiatry and Neurology (ABPN)      DISCLAIMER:   Although every effort was made to ensure the accuracy of this  electronic transcription, some errors in transcription may have occurred. GENERAL PATIENT INSTRUCTIONS:     A Healthy Lifestyle: Care Instructions  Your Care Instructions  A healthy lifestyle can help you feel good, stay at a healthy weight, and have plenty of energy for both work and play. A healthy lifestyle is something you can share with your whole family. A healthy lifestyle also can lower your risk for serious health problems, such as high blood pressure, heart disease, and diabetes. You can follow a few steps listed below to improve your health and the health of your family. Follow-up care is a key part of your treatment and safety. Be sure to make and go to all appointments, and call your doctor if you are having problems. Its also a good idea to know your test results and keep a list of the medicines you take. How can you care for yourself at home? Do not eat too much sugar, fat, or fast foods. You can still have dessert and treats now and then. The goal is moderation. Start small to improve your eating habits. Pay attention to portion sizes, drink less juice and soda pop, and eat more fruits and vegetables. Eat a healthy amount of food. A 3-ounce serving of meat, for example, is about the size of a deck of cards. Fill the rest of your plate with vegetables and whole grains. Limit the amount of soda and sports drinks you have every day. Drink more water when you are thirsty. Eat at least 5 servings of fruits and vegetables every day. It may seem like a lot, but it is not hard to reach this goal. A serving or helping is 1 piece of fruit, 1 cup of vegetables, or 2 cups of leafy, raw vegetables. Have an apple or some carrot sticks as an afternoon snack instead of a candy bar.  Try to have fruits and/or vegetables at every meal.  Make exercise part of your daily routine. You may want to start with simple activities, such as walking, bicycling, or slow swimming. Try to be active 30 to 60 minutes every day. You do not need to do all 30 to 60 minutes all at once. For example, you can exercise 3 times a day for 10 or 20 minutes. Moderate exercise is safe for most people, but it is always a good idea to talk to your doctor before starting an exercise program.  Keep moving. Peola Deems the lawn, work in the garden, or [employer]. Take the stairs instead of the elevator at work. If you smoke, quit. People who smoke have an increased risk for heart attack, stroke, cancer, and other lung illnesses. Quitting is hard, but there are ways to boost your chance of quitting tobacco for good. Use nicotine gum, patches, or lozenges. Ask your doctor about stop-smoking programs and medicines. Keep trying. In addition to reducing your risk of diseases in the future, you will notice some benefits soon after you stop using tobacco. If you have shortness of breath or asthma symptoms, they will likely get better within a few weeks after you quit. Limit how much alcohol you drink. Moderate amounts of alcohol (up to 2 drinks a day for men, 1 drink a day for women) are okay. But drinking too much can lead to liver problems, high blood pressure, and other health problems. Family health  If you have a family, there are many things you can do together to improve your health. Eat meals together as a family as often as possible. Eat healthy foods. This includes fruits, vegetables, lean meats and dairy, and whole grains. Include your family in your fitness plan. Most people think of activities such as jogging or tennis as the way to fitness, but there are many ways you and your family can be more active. Anything that makes you breathe hard and gets your heart pumping is exercise. Here are some tips:  Walk to do errands or to take your child to school or the bus.   Go for a family bike ride after dinner instead of watching TV. Where can you learn more? Go to https://chpepiceweb.healthQuantine. org and sign in to your [employer] account. Enter L278 in the Whim box to learn more about \"A Healthy Lifestyle: Care Instructions. \"     If you do not have an account, please click on the \"Sign Up Now\" link. Current as of: July 26, 2016  Content Version: 11.2  © 0258-0378 [employer]. Care instructions adapted under license by [facility] ([facility]). If you have questions about a medical condition or this instruction, always ask your healthcare professional. [address] any warranty or liability for your use of this information.

## 2022-08-22 RX ORDER — ATORVASTATIN CALCIUM 10 MG/1
TABLET, FILM COATED ORAL
Qty: 90 TABLET | Refills: 3 | Status: SHIPPED | OUTPATIENT
Start: 2022-08-22

## 2022-10-31 ENCOUNTER — HOSPITAL ENCOUNTER (OUTPATIENT)
Dept: MAMMOGRAPHY | Age: 72
Discharge: HOME | End: 2022-11-02
Payer: MEDICARE

## 2022-10-31 DIAGNOSIS — Z12.31 BREAST CANCER SCREENING BY MAMMOGRAM: ICD-10-CM

## 2022-10-31 PROCEDURE — 77063 BREAST TOMOSYNTHESIS BI: CPT

## 2022-11-01 ENCOUNTER — IMMUNIZATION (OUTPATIENT)
Dept: LAB | Age: 72
End: 2022-11-01
Payer: MEDICARE

## 2022-11-01 ENCOUNTER — HOSPITAL ENCOUNTER (OUTPATIENT)
Dept: LAB | Age: 72
Discharge: HOME | End: 2022-11-01
Payer: MEDICARE

## 2022-11-01 ENCOUNTER — OFFICE VISIT (OUTPATIENT)
Dept: FAMILY MEDICINE CLINIC | Age: 72
End: 2022-11-01
Payer: MEDICARE

## 2022-11-01 VITALS
WEIGHT: 194.6 LBS | TEMPERATURE: 97.6 F | HEART RATE: 67 BPM | DIASTOLIC BLOOD PRESSURE: 72 MMHG | BODY MASS INDEX: 36.74 KG/M2 | OXYGEN SATURATION: 95 % | HEIGHT: 61 IN | SYSTOLIC BLOOD PRESSURE: 130 MMHG

## 2022-11-01 DIAGNOSIS — R73.01 ELEVATED FASTING GLUCOSE: ICD-10-CM

## 2022-11-01 DIAGNOSIS — Z23 NEED FOR INFLUENZA VACCINATION: ICD-10-CM

## 2022-11-01 DIAGNOSIS — E78.2 MIXED HYPERLIPIDEMIA: ICD-10-CM

## 2022-11-01 DIAGNOSIS — Z86.73 HISTORY OF CVA (CEREBROVASCULAR ACCIDENT): Primary | ICD-10-CM

## 2022-11-01 DIAGNOSIS — E55.9 VITAMIN D DEFICIENCY: ICD-10-CM

## 2022-11-01 DIAGNOSIS — M21.611 BUNION, RIGHT FOOT: ICD-10-CM

## 2022-11-01 DIAGNOSIS — E03.9 ACQUIRED HYPOTHYROIDISM: ICD-10-CM

## 2022-11-01 LAB
ALBUMIN SERPL-MCNC: 4.2 G/DL (ref 3.5–5.2)
ALBUMIN/GLOBULIN RATIO: 1.3 (ref 1–2.5)
ALP BLD-CCNC: 102 U/L (ref 35–104)
ALT SERPL-CCNC: 16 U/L (ref 5–33)
ANION GAP SERPL CALCULATED.3IONS-SCNC: 8 MMOL/L (ref 9–17)
AST SERPL-CCNC: 18 U/L
BILIRUB SERPL-MCNC: 0.8 MG/DL (ref 0.3–1.2)
BUN BLDV-MCNC: 19 MG/DL (ref 8–23)
BUN/CREAT BLD: 27 (ref 9–20)
CALCIUM SERPL-MCNC: 9.9 MG/DL (ref 8.6–10.4)
CHLORIDE BLD-SCNC: 104 MMOL/L (ref 98–107)
CHOLESTEROL/HDL RATIO: 3.3
CHOLESTEROL: 155 MG/DL
CO2: 29 MMOL/L (ref 20–31)
CREAT SERPL-MCNC: 0.71 MG/DL (ref 0.5–0.9)
ESTIMATED AVERAGE GLUCOSE: 128 MG/DL
GFR SERPL CREATININE-BSD FRML MDRD: >60 ML/MIN/1.73M2
GLUCOSE BLD-MCNC: 102 MG/DL (ref 70–99)
HBA1C MFR BLD: 6.1 % (ref 4–6)
HDLC SERPL-MCNC: 47 MG/DL
LDL CHOLESTEROL: 83 MG/DL (ref 0–130)
POTASSIUM SERPL-SCNC: 4.6 MMOL/L (ref 3.7–5.3)
SODIUM BLD-SCNC: 141 MMOL/L (ref 135–144)
TOTAL PROTEIN: 7.5 G/DL (ref 6.4–8.3)
TRIGL SERPL-MCNC: 126 MG/DL

## 2022-11-01 PROCEDURE — 80053 COMPREHEN METABOLIC PANEL: CPT

## 2022-11-01 PROCEDURE — G8399 PT W/DXA RESULTS DOCUMENT: HCPCS | Performed by: FAMILY MEDICINE

## 2022-11-01 PROCEDURE — G8417 CALC BMI ABV UP PARAM F/U: HCPCS | Performed by: FAMILY MEDICINE

## 2022-11-01 PROCEDURE — 1036F TOBACCO NON-USER: CPT | Performed by: FAMILY MEDICINE

## 2022-11-01 PROCEDURE — G8427 DOCREV CUR MEDS BY ELIG CLIN: HCPCS | Performed by: FAMILY MEDICINE

## 2022-11-01 PROCEDURE — PBSHW INFLUENZA, FLUAD, (AGE 65 Y+), IM, PF, 0.5 ML: Performed by: FAMILY MEDICINE

## 2022-11-01 PROCEDURE — 1123F ACP DISCUSS/DSCN MKR DOCD: CPT | Performed by: FAMILY MEDICINE

## 2022-11-01 PROCEDURE — 80061 LIPID PANEL: CPT

## 2022-11-01 PROCEDURE — 99213 OFFICE O/P EST LOW 20 MIN: CPT

## 2022-11-01 PROCEDURE — 0134A COVID-19, MODERNA BIVALENT BOOSTER, (AGE 12Y+), IM, 50 MCG/0.5 ML: CPT | Performed by: FAMILY MEDICINE

## 2022-11-01 PROCEDURE — 3017F COLORECTAL CA SCREEN DOC REV: CPT | Performed by: FAMILY MEDICINE

## 2022-11-01 PROCEDURE — G8484 FLU IMMUNIZE NO ADMIN: HCPCS | Performed by: FAMILY MEDICINE

## 2022-11-01 PROCEDURE — 90694 VACC AIIV4 NO PRSRV 0.5ML IM: CPT | Performed by: FAMILY MEDICINE

## 2022-11-01 PROCEDURE — G0008 ADMIN INFLUENZA VIRUS VAC: HCPCS | Performed by: FAMILY MEDICINE

## 2022-11-01 PROCEDURE — PBSHW COVID-19, MODERNA BIVALENT BOOSTER, (AGE 12Y+), IM, 50 MCG/0.5 ML: Performed by: FAMILY MEDICINE

## 2022-11-01 PROCEDURE — 36415 COLL VENOUS BLD VENIPUNCTURE: CPT

## 2022-11-01 PROCEDURE — 1090F PRES/ABSN URINE INCON ASSESS: CPT | Performed by: FAMILY MEDICINE

## 2022-11-01 PROCEDURE — 83036 HEMOGLOBIN GLYCOSYLATED A1C: CPT

## 2022-11-01 PROCEDURE — 99214 OFFICE O/P EST MOD 30 MIN: CPT | Performed by: FAMILY MEDICINE

## 2022-11-01 RX ORDER — LEVOTHYROXINE SODIUM 0.12 MG/1
TABLET ORAL
Qty: 90 TABLET | Refills: 1 | Status: SHIPPED | OUTPATIENT
Start: 2022-11-01

## 2022-11-01 ASSESSMENT — PATIENT HEALTH QUESTIONNAIRE - PHQ9
SUM OF ALL RESPONSES TO PHQ QUESTIONS 1-9: 0
8. MOVING OR SPEAKING SO SLOWLY THAT OTHER PEOPLE COULD HAVE NOTICED. OR THE OPPOSITE, BEING SO FIGETY OR RESTLESS THAT YOU HAVE BEEN MOVING AROUND A LOT MORE THAN USUAL: 0
SUM OF ALL RESPONSES TO PHQ QUESTIONS 1-9: 0
6. FEELING BAD ABOUT YOURSELF - OR THAT YOU ARE A FAILURE OR HAVE LET YOURSELF OR YOUR FAMILY DOWN: 0
9. THOUGHTS THAT YOU WOULD BE BETTER OFF DEAD, OR OF HURTING YOURSELF: 0
5. POOR APPETITE OR OVEREATING: 0
SUM OF ALL RESPONSES TO PHQ QUESTIONS 1-9: 0
2. FEELING DOWN, DEPRESSED OR HOPELESS: 0
10. IF YOU CHECKED OFF ANY PROBLEMS, HOW DIFFICULT HAVE THESE PROBLEMS MADE IT FOR YOU TO DO YOUR WORK, TAKE CARE OF THINGS AT HOME, OR GET ALONG WITH OTHER PEOPLE: 0
SUM OF ALL RESPONSES TO PHQ9 QUESTIONS 1 & 2: 0
SUM OF ALL RESPONSES TO PHQ QUESTIONS 1-9: 0
3. TROUBLE FALLING OR STAYING ASLEEP: 0
1. LITTLE INTEREST OR PLEASURE IN DOING THINGS: 0
4. FEELING TIRED OR HAVING LITTLE ENERGY: 0
7. TROUBLE CONCENTRATING ON THINGS, SUCH AS READING THE NEWSPAPER OR WATCHING TELEVISION: 0

## 2022-11-01 NOTE — PROGRESS NOTES
Agreeable to covid booster and flu. MAW visit scheduled. Declines needing refills. Did not have fasting labs done. Patient has already had breakfast. Do you want her to wait to have lab done or can she do them today?

## 2022-11-01 NOTE — PROGRESS NOTES
[facility] [address] [place], [address]                        Telephone [phone]             Fax [phone]       Jayme Soliz  :  1950  Age:  74 y.o. MRN:  [id number]  Date of visit:  2022       Assessment and Plan:    1. History of CVA (cerebrovascular accident)  2. Mixed hyperlipidemia  She has orders for a lipid panel and comprehensive panel. She will be contacted when the results are available. Labs were also ordered to be done prior to her return visit in 6 months:   - Lipid Panel; Future  - Comprehensive Metabolic Panel; Future    3. Elevated fasting glucose  She has orders for a HgbA1c. She will be contacted when the results are available. - Hemoglobin A1C; Future was also ordered to be done prior to her return visit in 6 months. 4. Acquired hypothyroidism  TSH and free T4 were within normal limits on 2022:  Lab Results   Component Value Date/Time    TSH 2.55 2022 08:16 AM    THYROXINE 1.51 2022 08:16 AM      She was advised to continue with her current dose of Levothyroxine. Levothyroxine was refilled:   - levothyroxine (SYNTHROID) 125 MCG tablet; take 1 tablet by mouth once daily  Dispense: 90 tablet; Refill: 1    Labs were ordered to be done prior to her return visit in 6 months:   - TSH; Future  - T4, Free; Future    5. Vitamin D deficiency  Her 25-hydroxy Vitamin D level was within normal limits (42.5  Lab Results   Component Value Date    VITD25 42.5 2022    ng/mL) on 2022. [place] Clarke She was advised to continue with her current dose of Vitamin D. Vitamin D was refilled:      - Vitamin D 25 Hydroxy; Future    6. Bunion, right foot  A podiatry referral was ordered:  - Nicole Vasquez DPM, Podiatry, [place]           7. Routine health maintenance  Health maintenance was reviewed with the patient.    She has received three doses of the Derinda Leyva Covid-19 vaccine. She was advised that the updated Covid vaccine is recommended this fall. Annual influenza vaccine was recommended and ordered. All other health maintenance, including Shingrix, Tdap, Pneumovax, and Prevnar-13, is up to date at this time. Follow up instructions were given to the patient:  Return in about 6 months (around 5/1/2023) for hyperlipidemia, elevated glucose. Subjective:    Vinh Jarrett is a 74 y.o. female who presents to [place] [address] today (11/1/2022) for follow up/evaluation of:  Hyperlipidemia (6 month follow up), Hypertension, Hypothyroidism, Other (Vit D deficiency), and Foot Pain (Patient reports bunion on right foot and reports pain at times. Reports having for years, but reports pain has been increasing worse. Agreeable to podiatry referral. Rates pain 7/10)      Overall, she states that she has been doing well. She is tolerating her medications well. She reports foot pain. She states that she has had pain in the right foot associated with a bunion for many years. The pain has been worse recently. She has received three doses of the Moderna Covid-19 vaccine. The most recent dose was 7/19/2022.       Immunization history was reviewed:  Immunization History   Administered Date(s) Administered    COVID-19, MODERNA BLUE border, Primary or Immunocompromised, (age 12y+), IM, 100 mcg/0.5mL 02/26/2021, 03/26/2021    COVID-19, MODERNA Bivalent BOOSTER, (age 12y+), IM, 48 mcg/0.5 mL 11/01/2022    Hepatitis B 11/18/1997, 12/19/1998, 06/04/1999    Influenza A ([id number]) Vaccine PF IM 02/11/2010    Influenza Vaccine, unspecified formulation 01/21/2016    Influenza, FLUAD, (age 72 y+), Adjuvanted, 0.5mL 10/24/2020, 09/29/2021, 11/01/2022    Influenza, FLUARIX, FLULAVAL, FLUZONE (age 10 mo+) AND AFLURIA, (age 1 y+), PF, 0.5mL 01/23/2017    Influenza, High Dose (Fluzone 65 yrs and older) 01/21/2016, 11/14/2017, 11/06/2018, 09/27/2019    Pneumococcal Conjugate 13-valent ([id number]) 01/02/2015    Pneumococcal Polysaccharide ([id number]) 08/30/2013    Tdap (Boostrix, Adacel) 12/21/2009, 01/28/2019    Zoster Live (Zostavax) 08/16/2012    Zoster Recombinant (Shingrix) 05/01/2019, 09/27/2019          She has the following problem list:  Patient Active Problem List   Diagnosis    Hypothyroidism    Hyperlipidemia    Cerebral infarction ([address]    Memory loss due to medical condition    TIA (transient ischemic attack)    Meralgia paresthetica    Cataract    Myopia of both eyes with astigmatism and presbyopia    RLS (restless legs syndrome)    Myopia of both eyes    Lacunar infarction ([address]    CVA (cerebral vascular accident) ([address]    VBI (vertebrobasilar insufficiency)    JOHNATHAN due to [facility]    Mixed hyperlipidemia    Cerebrovascular accident (CVA) due to thrombosis of precerebral artery ([address]    Cerebral infarction due to thrombosis of vertebral artery ([address]    Late effects of CVA (cerebrovascular accident)    Other hyperlipidemia    Dizziness    Memory problem        Current medications are:  Outpatient Medications Marked as Taking for the 11/1/22 encounter (Office Visit) with Magaly Del Cid MD   Medication Sig Dispense Refill    metoprolol tartrate (LOPRESSOR) 25 MG tablet Take 0.5 tablets by mouth 2 times daily 180 tablet 3    atorvastatin (LIPITOR) 10 MG tablet take 1 tablet by mouth once daily 90 tablet 3    clopidogrel (PLAVIX) 75 MG tablet take 1 tablet by mouth once daily 90 tablet 1    rOPINIRole (REQUIP) 1 MG tablet take 1 tablet by mouth at bedtime 90 tablet 1    levothyroxine (SYNTHROID) 125 MCG tablet take 1 tablet by mouth once daily 90 tablet 1    nystatin (MYCOSTATIN) 983758 UNIT/GM powder Apply 3 times daily. 1 each 2    clotrimazole-betamethasone (LOTRISONE) 1-0.05 % cream Apply topically 2 times daily prn.  30 g 2    calcium carbonate (OSCAL) 500 MG TABS tablet Take 500 mg by mouth daily      fluticasone (FLONASE) 50 MCG/ACT nasal spray 2 sprays to each nostril once daily. (Patient taking differently: as needed 2 sprays to each nostril once daily. ) 1 Bottle 11    aspirin 81 MG tablet Take 81 mg by mouth daily. Vitamin D (CHOLECALCIFEROL) 1000 UNITS CAPS capsule Take 3,000 Units by mouth daily       Multiple Vitamin (MULTI-VITAMINS PO) Take 1 tablet by mouth Daily. She is allergic to sulfamethoxazole-trimethoprim and penicillins. She  reports that she has never smoked. She has never used smokeless tobacco.      Objective:    Vitals:    11/01/22 0928   BP: 130/72   Site: Right Upper Arm   Position: Sitting   Cuff Size: Large Adult   Pulse: 67   Temp: 97.6 °F (36.4 °C)   TempSrc: Temporal   SpO2: 95%   Weight: 194 lb 9.6 oz (88.3 kg)   Height: 5' 1\" (1.549 m)     Body mass index is 36.77 kg/m². Well-nourished, well-developed female with obesity, healthy-appearing, alert, cooperative, and in no acute distress. Neck supple. No adenopathy. Thyroid symmetric, normal size. Chest:  Normal expansion. Clear to auscultation. No rales, rhonchi, or wheezing. Heart sounds are normal.  Regular rate and rhythm without murmur, gallop or rub. Lower extremities have no edema. There is a bunion deformity of the right foot. She has had no recent labs.                (Please note that portions of this note were completed with a voice-recognition program. Efforts were made to edit the dictation but occasionally words are mis-transcribed.)

## 2022-11-23 ENCOUNTER — TELEMEDICINE (OUTPATIENT)
Dept: FAMILY MEDICINE CLINIC | Age: 72
End: 2022-11-23
Payer: MEDICARE

## 2022-11-23 DIAGNOSIS — Z00.00 MEDICARE ANNUAL WELLNESS VISIT, SUBSEQUENT: Primary | ICD-10-CM

## 2022-11-23 PROCEDURE — G0439 PPPS, SUBSEQ VISIT: HCPCS | Performed by: FAMILY MEDICINE

## 2022-11-23 PROCEDURE — 1123F ACP DISCUSS/DSCN MKR DOCD: CPT | Performed by: FAMILY MEDICINE

## 2022-11-23 PROCEDURE — 3017F COLORECTAL CA SCREEN DOC REV: CPT | Performed by: FAMILY MEDICINE

## 2022-11-23 PROCEDURE — G8484 FLU IMMUNIZE NO ADMIN: HCPCS | Performed by: FAMILY MEDICINE

## 2022-11-23 SDOH — ECONOMIC STABILITY: FOOD INSECURITY: WITHIN THE PAST 12 MONTHS, YOU WORRIED THAT YOUR FOOD WOULD RUN OUT BEFORE YOU GOT MONEY TO BUY MORE.: PATIENT DECLINED

## 2022-11-23 SDOH — ECONOMIC STABILITY: FOOD INSECURITY: WITHIN THE PAST 12 MONTHS, THE FOOD YOU BOUGHT JUST DIDN'T LAST AND YOU DIDN'T HAVE MONEY TO GET MORE.: PATIENT DECLINED

## 2022-11-23 ASSESSMENT — PATIENT HEALTH QUESTIONNAIRE - PHQ9
SUM OF ALL RESPONSES TO PHQ QUESTIONS 1-9: 0
1. LITTLE INTEREST OR PLEASURE IN DOING THINGS: 0
SUM OF ALL RESPONSES TO PHQ9 QUESTIONS 1 & 2: 0
2. FEELING DOWN, DEPRESSED OR HOPELESS: 0

## 2022-11-23 ASSESSMENT — SOCIAL DETERMINANTS OF HEALTH (SDOH): HOW HARD IS IT FOR YOU TO PAY FOR THE VERY BASICS LIKE FOOD, HOUSING, MEDICAL CARE, AND HEATING?: PATIENT DECLINED

## 2022-11-23 ASSESSMENT — LIFESTYLE VARIABLES
HOW OFTEN DO YOU HAVE A DRINK CONTAINING ALCOHOL: NEVER
HOW MANY STANDARD DRINKS CONTAINING ALCOHOL DO YOU HAVE ON A TYPICAL DAY: PATIENT DOES NOT DRINK

## 2022-11-23 NOTE — PATIENT INSTRUCTIONS
Personalized Preventive Plan for Farzana Bustamante - 11/23/2022  Medicare offers a range of preventive health benefits. Some of the tests and screenings are paid in full while other may be subject to a deductible, co-insurance, and/or copay. Some of these benefits include a comprehensive review of your medical history including lifestyle, illnesses that may run in your family, and various assessments and screenings as appropriate. After reviewing your medical record and screening and assessments performed today your provider may have ordered immunizations, labs, imaging, and/or referrals for you. A list of these orders (if applicable) as well as your Preventive Care list are included within your After Visit Summary for your review. Other Preventive Recommendations:    A preventive eye exam performed by an eye specialist is recommended every 1-2 years to screen for glaucoma; cataracts, macular degeneration, and other eye disorders. A preventive dental visit is recommended every 6 months. Try to get at least 150 minutes of exercise per week or 10,000 steps per day on a pedometer . Order or download the FREE \"Exercise & Physical Activity: Your Everyday Guide\" from [employer]. Call 5-397.686.9933 or search [employer] online. You need 2887-2305 mg of calcium and 9071-2763 IU of vitamin D per day. It is possible to meet your calcium requirement with diet alone, but a vitamin D supplement is usually necessary to meet this goal.  When exposed to the sun, use a sunscreen that protects against both UVA and UVB radiation with an SPF of 30 or greater. Reapply every 2 to 3 hours or after sweating, drying off with a towel, or swimming. Always wear a seat belt when traveling in a car. Always wear a helmet when riding a bicycle or motorcycle.

## 2022-11-23 NOTE — PROGRESS NOTES
Medicare Annual Wellness Visit    Beth Smith is here for Medicare AWV    Assessment & Plan   Medicare annual wellness visit, subsequent    Recommendations for Preventive Services Due: see orders and patient instructions/AVS.  Recommended screening schedule for the next 5-10 years is provided to the patient in written form: see Patient Instructions/AVS.     No follow-ups on file. Subjective       Patient's complete Health Risk Assessment and screening values have been reviewed and are found in Flowsheets. The following problems were reviewed today and where indicated follow up appointments were made and/or referrals ordered.     Positive Risk Factor Screenings with Interventions:             General Health and ACP:  General  In general, how would you say your health is?: Good  In the past 7 days, have you experienced any of the following: New or Increased Pain, New or Increased Fatigue, Loneliness, Social Isolation, Stress or Anger?: No  Do you get the social and emotional support that you need?: Yes  Do you have a Living Will?: Yes    Advance Directives       Power of  Living Will ACP-Advance Directive ACP-Power of     Not on File Not on File Not on File Not on File          General Health Risk Interventions:  No Living Will: ACP documents already completed- patient asked to provide copy to the office    Health Habits/Nutrition:  Physical Activity: Insufficiently Active    Days of Exercise per Week: 2 days    Minutes of Exercise per Session: 20 min     Have you lost any weight without trying in the past 3 months?: No     Have you seen the dentist within the past year?: Yes  Health Habits/Nutrition Interventions:  Inadequate physical activity:  patient is not ready to increase his/her physical activity level at this time             Objective      Patient-Reported Vitals  BP Observations: No, remote/electronic monitoring device was not used or able to be verified  Patient-Reported Weight: 190lb  Patient-Reported Height: 5 1           Allergies   Allergen Reactions    Sulfamethoxazole-Trimethoprim Swelling     lips    Penicillins Rash     Prior to Visit Medications    Medication Sig Taking? Authorizing Provider   levothyroxine (SYNTHROID) 125 MCG tablet take 1 tablet by mouth once daily Yes Jessica Roman MD   metoprolol tartrate (LOPRESSOR) 25 MG tablet Take 0.5 tablets by mouth 2 times daily Yes Kemal Red DO   atorvastatin (LIPITOR) 10 MG tablet take 1 tablet by mouth once daily Yes Isidra Vila MD   clopidogrel (PLAVIX) 75 MG tablet take 1 tablet by mouth once daily Yes Grace Bryant MD   rOPINIRole (REQUIP) 1 MG tablet take 1 tablet by mouth at bedtime Yes Grace Bryatn MD   nystatin (MYCOSTATIN) 057827 UNIT/GM powder Apply 3 times daily. Yes Jessica Roman MD   clotrimazole-betamethasone (LOTRISONE) 1-0.05 % cream Apply topically 2 times daily prn. Yes ARCENIO Wilhelm   calcium carbonate (OSCAL) 500 MG TABS tablet Take 500 mg by mouth daily Yes Historical Provider, MD   fluticasone (FLONASE) 50 MCG/ACT nasal spray 2 sprays to each nostril once daily. Patient taking differently: as needed 2 sprays to each nostril once daily. Yes NORMAN Gunn - CNP   aspirin 81 MG tablet Take 81 mg by mouth daily. Yes Historical Provider, MD   Vitamin D (CHOLECALCIFEROL) 1000 UNITS CAPS capsule Take 3,000 Units by mouth daily  Yes Historical Provider, MD   Multiple Vitamin (MULTI-VITAMINS PO) Take 1 tablet by mouth Daily. Yes Historical Provider, MD Nogueira (Including outside providers/suppliers regularly involved in providing care):   Patient Care Team:  Jessica Roman MD as PCP - General (Family Medicine)  Jessica Roman MD as PCP - [facility] Empaneled Provider     Reviewed and updated this visit:  Tobacco  Allergies  Meds  Med Hx  Surg Hx  Soc Hx  Fam Hx          Kimberly Ash, was evaluated through a synchronous (real-time) audio encounter.  The patient (or guardian if applicable) is aware that this is a billable service, which includes applicable co-pays. This Virtual Visit was conducted with patient's (and/or legal guardian's) consent. The visit was conducted pursuant to the emergency declaration under the [facility], [address] [place] [address] [facility] authority and the [employer] Act. Patient identification was verified, and a caregiver was present when appropriate. The patient was located at Home: [place] [address]. Provider was located at [facility] ([address] [place] [address]): [address] [place] [address]. Edwin Cota LPN, 48/[birth year], performed the documented evaluation under the direct supervision of the attending physician. This encounter was performed under my, Virginia Romano, direct supervision, 11/23/2022. Electronically signed by Shruthi Spence MD on 11/23/22 at 5:40 PM EST.

## 2022-11-30 ENCOUNTER — HOSPITAL ENCOUNTER (OUTPATIENT)
Dept: GENERAL RADIOLOGY | Age: 72
Discharge: HOME | End: 2022-12-02
Payer: MEDICARE

## 2022-11-30 ENCOUNTER — OFFICE VISIT (OUTPATIENT)
Dept: PODIATRY | Age: 72
End: 2022-11-30
Payer: MEDICARE

## 2022-11-30 VITALS
SYSTOLIC BLOOD PRESSURE: 128 MMHG | DIASTOLIC BLOOD PRESSURE: 72 MMHG | HEIGHT: 61 IN | WEIGHT: 194 LBS | HEART RATE: 66 BPM | BODY MASS INDEX: 36.63 KG/M2

## 2022-11-30 DIAGNOSIS — M79.671 RIGHT FOOT PAIN: ICD-10-CM

## 2022-11-30 DIAGNOSIS — M21.611 BUNION OF RIGHT FOOT: ICD-10-CM

## 2022-11-30 DIAGNOSIS — M21.611 BUNION OF RIGHT FOOT: Primary | ICD-10-CM

## 2022-11-30 PROCEDURE — 99203 OFFICE O/P NEW LOW 30 MIN: CPT | Performed by: PODIATRIST

## 2022-11-30 PROCEDURE — 3017F COLORECTAL CA SCREEN DOC REV: CPT | Performed by: PODIATRIST

## 2022-11-30 PROCEDURE — G8484 FLU IMMUNIZE NO ADMIN: HCPCS | Performed by: PODIATRIST

## 2022-11-30 PROCEDURE — 73630 X-RAY EXAM OF FOOT: CPT

## 2022-11-30 PROCEDURE — 1036F TOBACCO NON-USER: CPT | Performed by: PODIATRIST

## 2022-11-30 PROCEDURE — 1090F PRES/ABSN URINE INCON ASSESS: CPT | Performed by: PODIATRIST

## 2022-11-30 PROCEDURE — G8427 DOCREV CUR MEDS BY ELIG CLIN: HCPCS | Performed by: PODIATRIST

## 2022-11-30 PROCEDURE — 1123F ACP DISCUSS/DSCN MKR DOCD: CPT | Performed by: PODIATRIST

## 2022-11-30 PROCEDURE — G8417 CALC BMI ABV UP PARAM F/U: HCPCS | Performed by: PODIATRIST

## 2022-11-30 PROCEDURE — 99214 OFFICE O/P EST MOD 30 MIN: CPT | Performed by: PODIATRIST

## 2022-11-30 PROCEDURE — G8399 PT W/DXA RESULTS DOCUMENT: HCPCS | Performed by: PODIATRIST

## 2022-11-30 NOTE — PROGRESS NOTES
Subjective:  Shantelle Benson is a 74 y.o. female who presents to the office today complaining of R foot pain. Symptoms began  year(s) ago. Getting worse recently. Patient relates pain is Present. Pain is rated 7 out of 10 and is described as waxing and waning, moderate. Treatments prior to today's visit include: change in shoes. Currently denies F/C/N/V. Allergies   Allergen Reactions    Sulfamethoxazole-Trimethoprim Swelling     lips    Penicillins Rash       Past Medical History:   Diagnosis Date    Acute confusion     CVA (cerebral infarction)     Hyperlipidemia     Hypothyroidism     Lacunar infarction ([facility] [address]     Memory loss due to medical condition     Meralgia paresthetica     left side    Myopia with astigmatism and presbyopia     Perennial allergic rhinitis     RLS (restless legs syndrome)     Status post biopsy of skin 09/2022    TIA (transient ischemic attack) 2008, 1/06/2014    with brief confusion    VBI (vertebrobasilar insufficiency)        Prior to Admission medications    Medication Sig Start Date End Date Taking? Authorizing Provider   levothyroxine (SYNTHROID) 125 MCG tablet take 1 tablet by mouth once daily 11/1/22  Yes Shruthi Spence MD   metoprolol tartrate (LOPRESSOR) 25 MG tablet Take 0.5 tablets by mouth 2 times daily 10/3/22  Yes Kemal Red DO   atorvastatin (LIPITOR) 10 MG tablet take 1 tablet by mouth once daily 8/22/22  Yes Brianna Palafox MD   clopidogrel (PLAVIX) 75 MG tablet take 1 tablet by mouth once daily 6/7/22  Yes Abdon Dillard MD   rOPINIRole (REQUIP) 1 MG tablet take 1 tablet by mouth at bedtime 6/2/22  Yes Abdon Dillard MD   nystatin (MYCOSTATIN) 553099 UNIT/GM powder Apply 3 times daily.  4/29/22  Yes Shruthi Spence MD   clotrimazole-betamethasone (LOTRISONE) 1-0.05 % cream Apply topically 2 times daily prn. 2/3/21  Yes ARCENIO Estevez   calcium carbonate (OSCAL) 500 MG TABS tablet Take 500 mg by mouth daily   Yes Historical Provider, MD fluticasone (FLONASE) 50 MCG/ACT nasal spray 2 sprays to each nostril once daily. Patient taking differently: as needed 2 sprays to each nostril once daily. 17  Yes Fred Cornea, APRN - CNP   aspirin 81 MG tablet Take 81 mg by mouth daily. Yes Historical Provider, MD   Vitamin D (CHOLECALCIFEROL) 1000 UNITS CAPS capsule Take 3,000 Units by mouth daily    Yes Historical Provider, MD   Multiple Vitamin (MULTI-VITAMINS PO) Take 1 tablet by mouth Daily.    Yes Historical Provider, MD       Past Surgical History:   Procedure Laterality Date    CARDIAC CATHETERIZATION  2014    [facility]   nl cath    CATARACT REMOVAL Bilateral     COLONOSCOPY  2015    mild diverticulosis:  Kari BERNARD    INSERTABLE CARDIAC MONITOR  14    removed    INTRACAPSULAR CATARACT EXTRACTION Right 2020    Right Cataract Extraction w/ IOL Implant performed by Vanessa Stokes MD at [address] [place] [address] Left 10/27/2020    Left Cataract Extraction w/ IOL Implant performed by Vanessa Stokes MD at [facility] / [address] [place] [address]      hemangioma removal on right foot 2nd toe    [address]       Family History   Problem Relation Age of Onset    Stroke Mother     Other Mother          from pneumonia    Liver Disease Father         due to alcohol    Heart Disease Father         valve disease    Diabetes Father     Thyroid Disease Sister     Cancer Sister         breast cancer older sister    Breast Cancer Sister     Diabetes Brother     Breast Cancer Maternal Grandmother     High Cholesterol Sister     Diabetes Brother     Other Paternal Aunt         late onset alzheimers    Glaucoma Neg Hx     Cataracts Neg Hx        Social History     Tobacco Use    Smoking status: Never    Smokeless tobacco: Never   Substance Use Topics    Alcohol use: No       ROS: All 14 ROS systems reviewed and pertinent positives noted above, all others negative. Lower Extremity Physical Examination:     Vitals:   Vitals:    11/30/22 1021   BP: 128/72   Pulse: 66     General: AAO x 3 in NAD. Vascular: DP and PT pulses palpable 2/4, bilateral.  CFT <3 seconds, bilateral.  Hair growth present to the level of the digits, bilateral.  Edema absent, bilateral.  Varicosities absent, bilateral. Erythema absent, bilateral. Distal Rubor absent bilateral.  Temperature within normal limits bilateral. Hyperpigmentation absent bilateral. No atrophic skin. Neurological: Sensation intact to light touch to level of digits, bilateral.  Protective sensation intact 10/10 sites via 5.07/10g [place]-Yael Monofilament, bilateral.  negative Tinel's, bilateral.  negative Valleix sign, bilateral.  Vibratory intact bilateral.  Reflexes Decreased bilateral.  Paresthesias negative. Dysthesias negative. Sharp/dull intact bilateral.   Musculoskeletal: Muscle strength 5/5, bilateral.  Pain present upon palpation R medial bunion. Normal medial longitudinal arch, bilateral.  Ankle ROM within normal limits,bilateral.  1st MPJ ROM within normal limits, bilateral.  Dorsally contracted digits absent. No other foot deformities. Integument: Warm, dry, supple, bilateral.  Open lesion absent, bilateral.  Interdigital maceration absent to web spaces bilateral.  Nails within normal limits. Fissures absent, bilateral. Hyperkeratotic tissue is present. Seen medial R 1st met head with small central core. Asessment: Patient is a 74 y.o. female with:    Diagnosis Orders   1. Bunion of right foot        2. Right foot pain            Plan: Patient examined and evaluated. Current condition and treatment options discussed in detail. Bunion condition discussed with pt. Appropriate offloading pads, wider width shoe gear, and OTC anti inflammatories were all discussed.   More aggressive intervention was briefly discussed and further recommendations will take place once the x rays are checked. Orders Placed This Encounter   Procedures    XR FOOT RIGHT (MIN 3 VIEWS)     Standing Status:   Future     Standing Expiration Date:   12/1/2023     Scheduling Instructions:      WB     Patient is low level medical decision making. Patient is acute uncomplicated condition. 1 new test ordered. Lower extremity with the current treatment course. Contact office with any questions/problems/concerns. RTC in 3week(s).

## 2022-12-05 DIAGNOSIS — G25.81 RLS (RESTLESS LEGS SYNDROME): ICD-10-CM

## 2022-12-05 DIAGNOSIS — I63.9 CEREBROVASCULAR ACCIDENT (CVA), UNSPECIFIED MECHANISM (HCC): ICD-10-CM

## 2022-12-05 RX ORDER — ROPINIROLE 1 MG/1
TABLET, FILM COATED ORAL
Qty: 90 TABLET | Refills: 1 | Status: SHIPPED | OUTPATIENT
Start: 2022-12-05

## 2022-12-05 RX ORDER — CLOPIDOGREL BISULFATE 75 MG/1
TABLET ORAL
Qty: 90 TABLET | Refills: 1 | Status: SHIPPED | OUTPATIENT
Start: 2022-12-05

## 2022-12-07 ENCOUNTER — OFFICE VISIT (OUTPATIENT)
Dept: NEUROLOGY | Age: 72
End: 2022-12-07
Payer: MEDICARE

## 2022-12-07 VITALS
DIASTOLIC BLOOD PRESSURE: 68 MMHG | OXYGEN SATURATION: 96 % | HEIGHT: 61 IN | WEIGHT: 195.8 LBS | HEART RATE: 74 BPM | SYSTOLIC BLOOD PRESSURE: 114 MMHG | BODY MASS INDEX: 36.97 KG/M2

## 2022-12-07 DIAGNOSIS — I63.00 CEREBROVASCULAR ACCIDENT (CVA) DUE TO THROMBOSIS OF PRECEREBRAL ARTERY (HCC): ICD-10-CM

## 2022-12-07 DIAGNOSIS — G45.9 TIA (TRANSIENT ISCHEMIC ATTACK): Primary | ICD-10-CM

## 2022-12-07 DIAGNOSIS — G45.0 VBI (VERTEBROBASILAR INSUFFICIENCY): ICD-10-CM

## 2022-12-07 PROCEDURE — G8484 FLU IMMUNIZE NO ADMIN: HCPCS | Performed by: PSYCHIATRY & NEUROLOGY

## 2022-12-07 PROCEDURE — G8427 DOCREV CUR MEDS BY ELIG CLIN: HCPCS | Performed by: PSYCHIATRY & NEUROLOGY

## 2022-12-07 PROCEDURE — 1123F ACP DISCUSS/DSCN MKR DOCD: CPT | Performed by: PSYCHIATRY & NEUROLOGY

## 2022-12-07 PROCEDURE — 1036F TOBACCO NON-USER: CPT | Performed by: PSYCHIATRY & NEUROLOGY

## 2022-12-07 PROCEDURE — 1090F PRES/ABSN URINE INCON ASSESS: CPT | Performed by: PSYCHIATRY & NEUROLOGY

## 2022-12-07 PROCEDURE — G8417 CALC BMI ABV UP PARAM F/U: HCPCS | Performed by: PSYCHIATRY & NEUROLOGY

## 2022-12-07 PROCEDURE — G8399 PT W/DXA RESULTS DOCUMENT: HCPCS | Performed by: PSYCHIATRY & NEUROLOGY

## 2022-12-07 PROCEDURE — 3017F COLORECTAL CA SCREEN DOC REV: CPT | Performed by: PSYCHIATRY & NEUROLOGY

## 2022-12-07 PROCEDURE — 99214 OFFICE O/P EST MOD 30 MIN: CPT | Performed by: PSYCHIATRY & NEUROLOGY

## 2022-12-07 ASSESSMENT — ENCOUNTER SYMPTOMS
NAUSEA: 0
VISUAL CHANGE: 0
COUGH: 0
SHORTNESS OF BREATH: 0
EYE DISCHARGE: 0
ABDOMINAL DISTENTION: 0
BACK PAIN: 0
CHANGE IN BOWEL HABIT: 0
EYE REDNESS: 0
BLOOD IN STOOL: 0
PHOTOPHOBIA: 0
CHEST TIGHTNESS: 0
CONSTIPATION: 0
WHEEZING: 0
TROUBLE SWALLOWING: 0
VOMITING: 0
VOICE CHANGE: 0
SORE THROAT: 0
BOWEL INCONTINENCE: 0
CHOKING: 0
APNEA: 0
SINUS PRESSURE: 0
COLOR CHANGE: 0
EYE ITCHING: 0
EYE PAIN: 0
FACIAL SWELLING: 0
ABDOMINAL PAIN: 0
DIARRHEA: 0
SWOLLEN GLANDS: 0

## 2022-12-07 NOTE — PROGRESS NOTES
Subjective:        Patient ID: Beth Smith is a 74 y.o. RIGHT   HANDED female. Cerebrovascular Accident  This is a chronic problem. Episode onset: TIA  IN  2008   AND  CVA  IN  2014. The problem occurs rarely. The problem has been resolved. Pertinent negatives include no abdominal pain, anorexia, arthralgias, change in bowel habit, chest pain, chills, congestion, coughing, diaphoresis, fatigue, fever, headaches, joint swelling, myalgias, nausea, neck pain, numbness, rash, sore throat, swollen glands, urinary symptoms, vertigo, visual change, vomiting or weakness. Nothing aggravates the symptoms. Treatments tried: ASA,  PLAVIX. The treatment provided significant relief. Dizziness  This is a chronic problem. Episode onset: MORE  THAN   2 YEARS. The problem occurs intermittently. The problem has been resolved. Pertinent negatives include no abdominal pain, anorexia, arthralgias, change in bowel habit, chest pain, chills, congestion, coughing, diaphoresis, fatigue, fever, headaches, joint swelling, myalgias, nausea, neck pain, numbness, rash, sore throat, swollen glands, urinary symptoms, vertigo, visual change, vomiting or weakness. Nothing aggravates the symptoms. She has tried nothing for the symptoms. The treatment provided significant relief. Neurologic Problem  The patient's pertinent negatives include no altered mental status, clumsiness, focal sensory loss, focal weakness, loss of balance, memory loss, near-syncope, slurred speech, syncope, visual change or weakness. Primary symptoms comment: MILD SHORT  TERM  MEMORY  PROBLEMS  . This is a chronic problem. The current episode started more than 1 year ago. The neurological problem developed insidiously. The problem is unchanged. There was no focality noted. Associated symptoms include dizziness.  Pertinent negatives include no abdominal pain, auditory change, aura, back pain, bladder incontinence, bowel incontinence, chest pain, confusion, diaphoresis, fatigue, fever, headaches, light-headedness, nausea, neck pain, palpitations, shortness of breath, vertigo or vomiting. Past treatments include nothing. The treatment provided no relief. Her past medical history is significant for a CVA. There is no history of a bleeding disorder, a clotting disorder, dementia, head trauma, liver disease, mood changes or seizures. History obtained from  The patient     and other  available medical records were  Also  reviewed. 1)     PREVIOUS     H/O      TIA    WITH  MEMORY  LOSS                      AND  CONFUSION     IN      2008                      -   RESOLVED              2)    PREVIOUS     H/O       STROKE    IN   January 2014. H/O   INFARCT  IN MEDIAL   TEMPORAL  LOBE                 PREVIOUS    H/O      CONFUSION  AND  MEMORY  LOSS                        FOR   5  HOURS  AT  THAT  TIME.                                -   RESOLVED               3)     ON    ASA   AND  PLAVIX    AND  TOLERATING  THE  SAME. NO  RECURRENCE   OF  TIA OR  CVA            4)        H/O     RESTLESS  LEG  SYNDROME                   FOR  MORE  THAN    30  YEARS             -      BETTER  CONTROLLED   ON  REQUIP               5)     NO   H/O  CLINICAL  SEIZURE  ACTIVITY. NO  RECURRENCE  OF  MEMORY  LOSS                             OR  CONFUSION. 6)       NO   EVIDENCE  OF  PARKINSONIAN  FEATURES                    PATIENT  DENIES  ANY  GAIT  DIFFICULTIES                      OR  BALANCE  PROBLEMS          7)           MERALGIA   PARESTHETICA  ON   THE  LEFT  THIGH                       -   STABLE          8)      MULTIPLE   CO  MORBID  MEDICAL  CONDITIONS. BEING  FOLLOWED  BY  HER  PCP. 9)      MILD  SHORT TERM  MEMORY PROBLEMS                         --  FOR   4   YEARS                 PATIENT  NOT  CONCERNED.                   PATIENT  NOT  INTERESTED  IN  MEDICATION FOR  DEMENTIA                  PATIENT  COULD BE  TRIED  WITH  MEDICATION,                 IF  PATIENT  IS  WILLING  FOR  THE  SAME. 10)         PREVIOUS     H/O    DIZZINESS                    -   NO  RECURRENCE            11)       CAROTID  DOPPLER,  TCD  IN  JAN. 2019      SHOWED                            NO  SIGNIFICANT  ABNORMALITIES                 12)      PATIENT  AWARE  OF  VARIOUS  RISK  FACTORS  FOR  TIA  /  STROKE                      REVIEWED,  DISCUSSED   WITH  PATIENT  IN  DETAIL. PATIENT  TOLERATING  ASA  81   MG  AND PLAVIX   75   MG  PO  DAILY                               WITH   OUT  ANY   SIDE  EFFECTS      AND  GETTING  BENEFIT. 13)       PATIENT  DENIES    ANY    RECURRENCE  OF  TIA /   STROKE                           OR  DIZZINESS. PATIENT  DENIES    ANY  NEW  NEUROLOGICAL   CONCERNS. The  Duration,  Quality,  Severity,  Location,  Timing,  Context,  Modifying  Factors   Of   The   Chief   Complaint       And  Present  Illness   Was   Reviewed   In   Chronological   [facility].              Patient   Indicates   The  Presence   And  The  Absence  Of  The  Following  Associated         And   Additional  Neurological    Symptoms:                                Balance  And coordination problems  absent           Gait problems     absent            Headaches      absent              Migraines           absent           Memory problems        PRESENT               Confusion        absent            Paresthesia numbness          absent           Seizures  And  Starring  Episodes           absent           Syncope,  Near  syncopal episodes         absent           Speech problems           absent             Swallowing  Problems      absent            Dizziness,  Light headedness           absent                        Vertigo        absent             Generalized   Weakness    absent              focal  Weakness absent             Tremors         absent              Sleep  Problems     absent             History  Of   Recent   Head  Injury     absent             History  Of   Recent  TIA     absent             History  Of   Recent    Stroke     absent             Neck  Pain and  Neck muscle  Spasms  Absent               Radiating  down   And   Weakness           absent            Lower back   Pain  And     Spasms  Absent              Radiating    Down   And   Weakness          absent                H/O   FALLS        absent               History  Of   Visual  Symptoms    Absent                  Associated   Diplopia       absent                                                                    Also   Additional   Symptoms   Present    As  Documented    In   The detailed      Review  Of  Systems   And    Please   Refer   To    Them for   Additional  Information. Any components  That are either  Unobtainable  Or  Limited  In   HPI, ROS  And/or PFSH       Are   Due   To   Patient's  Medical  Problems,  Clinical  Condition and/or lack of other  Alternate resources.             RECORDS   REVIEWED:    historical medical records, lab reports, office notes and radiology reports         INFORMATION   REVIEWED:     MEDICAL   HISTORY,     SURGICAL   HISTORY,   MEDICATIONS   LIST,   ALLERGIES AND  DRUG  INTOLERANCES,     FAMILY   HISTORY,  SOCIAL  HISTORY,    PROBLEM  LIST   FOR  PATIENT  CARE   COORDINATION           Past Medical History:   Diagnosis Date    Acute confusion     CVA (cerebral infarction)     Hyperlipidemia     Hypothyroidism     Lacunar infarction ([facility]     Memory loss due to medical condition     Meralgia paresthetica     left side    Myopia with astigmatism and presbyopia     Perennial allergic rhinitis     RLS (restless legs syndrome)     Status post biopsy of skin 09/2022    TIA (transient ischemic attack) 2008, 1/06/2014    with brief confusion    VBI (vertebrobasilar insufficiency) Past Surgical History:   Procedure Laterality Date    CARDIAC CATHETERIZATION  9/2014    [facility]   nl cath    CATARACT REMOVAL Bilateral 2020    COLONOSCOPY  02/25/2015    mild diverticulosis:  Kari BERNARD    INSERTABLE CARDIAC MONITOR  4/22/16    removed    INTRACAPSULAR CATARACT EXTRACTION Right 9/29/2020    Right Cataract Extraction w/ IOL Implant performed by Venkata Bennett MD at [facility] Left 10/27/2020    Left Cataract Extraction w/ IOL Implant performed by Venkata Bennett MD at [place] / [address] [place] [address]      hemangioma removal on right foot 2nd toe    [address]                 Current Outpatient Medications   Medication Sig Dispense Refill    clopidogrel (PLAVIX) 75 MG tablet take 1 tablet by mouth once daily 90 tablet 1    rOPINIRole (REQUIP) 1 MG tablet take 1 tablet by mouth at bedtime 90 tablet 1    levothyroxine (SYNTHROID) 125 MCG tablet take 1 tablet by mouth once daily 90 tablet 1    metoprolol tartrate (LOPRESSOR) 25 MG tablet Take 0.5 tablets by mouth 2 times daily 180 tablet 3    atorvastatin (LIPITOR) 10 MG tablet take 1 tablet by mouth once daily 90 tablet 3    nystatin (MYCOSTATIN) 961553 UNIT/GM powder Apply 3 times daily. 1 each 2    clotrimazole-betamethasone (LOTRISONE) 1-0.05 % cream Apply topically 2 times daily prn. 30 g 2    calcium carbonate (OSCAL) 500 MG TABS tablet Take 500 mg by mouth daily      fluticasone (FLONASE) 50 MCG/ACT nasal spray 2 sprays to each nostril once daily. (Patient taking differently: as needed 2 sprays to each nostril once daily. ) 1 Bottle 11    aspirin 81 MG tablet Take 81 mg by mouth daily. Vitamin D (CHOLECALCIFEROL) 1000 UNITS CAPS capsule Take 3,000 Units by mouth daily       Multiple Vitamin (MULTI-VITAMINS PO) Take 1 tablet by mouth Daily. No current facility-administered medications for this visit. Allergies   Allergen Reactions    Sulfamethoxazole-Trimethoprim Swelling     lips    Penicillins Rash               Family History   Problem Relation Age of Onset    Stroke Mother     Other Mother          from pneumonia    Liver Disease Father         due to alcohol    Heart Disease Father         valve disease    Diabetes Father     Thyroid Disease Sister     Cancer Sister         breast cancer older sister    Breast Cancer Sister     Diabetes Brother     Breast Cancer Maternal Grandmother     High Cholesterol Sister     Diabetes Brother     Other Paternal Aunt         late onset alzheimers    Glaucoma Neg Hx     Cataracts Neg Hx              Social History     Socioeconomic History    Marital status:      Spouse name: Not on file    Number of children: Not on file    Years of education: Not on file    Highest education level: Not on file   Occupational History    Occupation: retired    Tobacco Use    Smoking status: Never    Smokeless tobacco: Never   Vaping Use    Vaping Use: Never used   Substance and Sexual Activity    Alcohol use: No    Drug use: No    Sexual activity: Not Currently     Partners: Male   Other Topics Concern    Not on file   Social History Narrative    Not on file     Social Determinants of Health     Financial Resource Strain: Unknown    Difficulty of Paying Living Expenses: Patient refused   Food Insecurity: Unknown    Worried About Running Out of Food in the Last Year: Patient refused    Ran Out of Food in the Last Year: Patient refused   Transportation Needs: Not on file   Physical Activity: Insufficiently Active    Days of Exercise per Week: 2 days    Minutes of Exercise per Session: 20 min   Stress: Not on file   Social Connections: Not on file   Intimate Partner Violence: Not on file   Housing Stability: Not on file       Vitals:    22 0919   BP: 114/68   Pulse: 74   SpO2: 96%         Wt Readings from Last 3 Encounters:   22 195 lb 12.8 oz (88.8 kg)   22 194 lb (88 kg)   11/01/22 194 lb 9.6 oz (88.3 kg)         BP Readings from Last 3 Encounters:   12/07/22 114/68   11/30/22 128/72   11/01/22 130/72       Hematology and Coagulation  Lab Results   Component Value Date/Time    WBC 8.2 02/11/2020 07:51 AM    RBC 5.15 02/11/2020 07:51 AM    HGB 14.1 02/11/2020 07:51 AM    HCT 44.3 02/11/2020 07:51 AM    MCV 86.0 02/11/2020 07:51 AM    MCH 27.4 02/11/2020 07:51 AM    MCHC 31.8 02/11/2020 07:51 AM    RDW 12.5 02/11/2020 07:51 AM     02/11/2020 07:51 AM    MPV 9.8 02/11/2020 07:51 AM       Chemistries  Lab Results   Component Value Date/Time     11/01/2022 10:29 AM    K 4.6 11/01/2022 10:29 AM     11/01/2022 10:29 AM    CO2 29 11/01/2022 10:29 AM    BUN 19 11/01/2022 10:29 AM    CREATININE 0.71 11/01/2022 10:29 AM    CALCIUM 9.9 11/01/2022 10:29 AM    PROT 7.5 11/01/2022 10:29 AM    LABALBU 4.2 11/01/2022 10:29 AM    BILITOT 0.8 11/01/2022 10:29 AM    ALKPHOS 102 11/01/2022 10:29 AM    AST 18 11/01/2022 10:29 AM    ALT 16 11/01/2022 10:29 AM     Lab Results   Component Value Date/Time    ALKPHOS 102 11/01/2022 10:29 AM    ALT 16 11/01/2022 10:29 AM    AST 18 11/01/2022 10:29 AM    PROT 7.5 11/01/2022 10:29 AM    BILITOT 0.8 11/01/2022 10:29 AM    LABALBU 4.2 11/01/2022 10:29 AM     Lab Results   Component Value Date/Time    BUN 19 11/01/2022 10:29 AM    CREATININE 0.71 11/01/2022 10:29 AM     Lab Results   Component Value Date/Time    CALCIUM 9.9 11/01/2022 10:29 AM    MG 2.1 01/06/2014 06:38 PM     Lab Results   Component Value Date/Time    AST 18 11/01/2022 10:29 AM    ALT 16 11/01/2022 10:29 AM       Lab Results   Component Value Date/Time    CKTOTAL 59 11/08/2014 08:54 AM                 Review of Systems   Constitutional:  Negative for appetite change, chills, diaphoresis, fatigue, fever and unexpected weight change.    HENT:  Negative for congestion, dental problem, drooling, ear discharge, ear pain, facial swelling, hearing loss, mouth sores, nosebleeds, postnasal drip, sinus pressure, sore throat, tinnitus, trouble swallowing and voice change. Eyes:  Negative for photophobia, pain, discharge, redness, itching and visual disturbance. Respiratory:  Negative for apnea, cough, choking, chest tightness, shortness of breath and wheezing. Cardiovascular:  Negative for chest pain, palpitations, leg swelling and near-syncope. Gastrointestinal:  Negative for abdominal distention, abdominal pain, anorexia, blood in stool, bowel incontinence, change in bowel habit, constipation, diarrhea, nausea and vomiting. Endocrine: Negative for cold intolerance, heat intolerance, polydipsia, polyphagia and polyuria. Genitourinary:  Negative for bladder incontinence. Musculoskeletal:  Negative for arthralgias, back pain, gait problem, joint swelling, myalgias, neck pain and neck stiffness. Skin:  Negative for color change, pallor, rash and wound. Allergic/Immunologic: Negative for environmental allergies, food allergies and immunocompromised state. Neurological:  Positive for dizziness. Negative for vertigo, tremors, focal weakness, seizures, syncope, facial asymmetry, speech difficulty, weakness, light-headedness, numbness, headaches and loss of balance. Hematological:  Negative for adenopathy. Does not bruise/bleed easily. Psychiatric/Behavioral:  Positive for decreased concentration. Negative for agitation, behavioral problems, confusion, dysphoric mood, hallucinations, memory loss, self-injury, sleep disturbance and suicidal ideas. The patient is not nervous/anxious and is not hyperactive. Objective:   Physical Exam  Constitutional:       Appearance: She is well-developed. HENT:      Head: Normocephalic and atraumatic. No raccoon eyes or Abernathy's sign.       Right Ear: External ear normal.      Left Ear: External ear normal.      Nose: Nose normal.   Eyes:      Conjunctiva/sclera: Conjunctivae normal.      Pupils: Pupils are equal, round, and reactive to light. Neck:      Thyroid: No thyroid mass or thyromegaly. Vascular: No carotid bruit. Trachea: No tracheal deviation. Meningeal: Brudzinski's sign and Kernig's sign absent. Cardiovascular:      Rate and Rhythm: Normal rate and regular rhythm. Pulmonary:      Effort: Pulmonary effort is normal.   Musculoskeletal:         General: No tenderness. Normal range of motion. Cervical back: Normal range of motion and neck supple. No rigidity. No muscular tenderness. Normal range of motion. Skin:     General: Skin is warm. Coloration: Skin is not pale. Findings: No erythema or rash. Nails: There is no clubbing. Psychiatric:         Attention and Perception: She is attentive. Mood and Affect: Mood is not anxious or depressed. Affect is not labile, blunt or inappropriate. Speech: She is communicative. Speech is not rapid and pressured, delayed, slurred or tangential.         Behavior: Behavior is not agitated, slowed, aggressive, withdrawn, hyperactive or combative. Behavior is cooperative. Thought Content: Thought content is not paranoid or delusional. Thought content does not include homicidal or suicidal ideation. Thought content does not include homicidal or suicidal plan. Cognition and Memory: Cognition is impaired. Memory is impaired. She does not exhibit impaired recent memory or impaired remote memory. Judgment: Judgment is not impulsive or inappropriate. NEUROLOGICAL EXAMINATION :    A) MENTAL STATUS:                   Alert and  oriented  To time, place  And  Person. No Aphasia. No  Dysarthria. Able   To  Follow     SIMPLE   commands without   Any  Difficulty. No right  To left confusion. Normal  Speech  And language function.                    Insight and  Judgment ,Fund  Of  Knowledge   within normal limits                Recent  And Remote memory   DECREASED                  Attention &Concentration are    DECREASED                                                 B) CRANIAL NERVES :             2 CN : Visual  Acuity  And  Visual fields  within normal limits                        Fundi  Could  Not  Be  Could  Not  Be  Evaluated. 3,4,6 CN : Both  Pupils are   Reactive and  Equal.                            Extraocular   Movements  Are  Intact. No  Nystagmus. No  TESSA. No  Afferent  Pupillary  Defect noted. 5 CN :  Normal  Facial sensations and Corneal  Reflexes           7 CN :  Normal  Facial  Symmetry  And  Strength. No facial  Weakness. 8 CN :  Hearing  Appears subnormal          9, 10 CN: Normal spontaneous, reflex palate movements         11 CN:   Normal  Shoulder shrug and  Strength         12 CN :   Normal  Tongue movements and  Tongue  In midline                        No tongue   Fasciculations or atrophy         C) MOTOR  EXAM:                 Strength  In upper  And  Lower extremities   within normal limits                     Rapid alternating  And  repetitions  Movements  within normal limits               Muscle  Tone  In upper  And  Lower  Extremities  within normal limits                No rigidity. No  Spasticity. Bradykinesia   absent               No  Asterixis. Sustention  Tremor , Resting  Tremor   absent                    No other  Abnormal  Movements noted             D) SENSORY :               light touch, pinprick, position  And  Vibration  DECREASED        E) REFLEXES:                   Deep  Tendon  Reflexes within normal limits                    No pathological  Reflexes  Bilaterally.                                   F) COORDINATION  AND  GAIT :                                Station and  Gait  within normal limits                                        Romberg's test negative                          Ataxia negative        Assessment:           Patient Active Problem List   Diagnosis    Hypothyroidism    Hyperlipidemia    Cerebral infarction ([address]    Memory loss due to medical condition    TIA (transient ischemic attack)    Meralgia paresthetica    Cataract    Myopia of both eyes with astigmatism and presbyopia    RLS (restless legs syndrome)    Myopia of both eyes    Lacunar infarction ([address]    CVA (cerebral vascular accident) ([address]    VBI (vertebrobasilar insufficiency)    [facility] due to 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 The right internal carotid artery demonstrates the systolic velocities of 73,   80, 89 cm/sec in the proximal, mid and distal segments respectively. The external carotid artery is patent. The vertebral artery demonstrates   normal antegrade flow. No evidence of focal atherosclerotic plaque. ICA/CCA ratio of 1.0. LEFT:       The left common carotid artery demonstrates peak systolic velocities of 92,   80 80 cm/sec in the proximal and distal segments respectively. The left internal carotid artery demonstrates the systolic velocities of 63,   94, 90 cm/sec in the proximal, mid and distal segments respectively. The external carotid artery is patent. The vertebral artery demonstrates   normal antegrade flow. No evidence of focal atherosclerotic plaque. ICA/CCA ratio of 1.0. Impression   The right internal carotid artery demonstrates 0-50% stenosis . The left internal carotid artery demonstrates 0-50% stenosis . Bilateral vertebral arteries are patent with flow in the normal direction. There is no significant change from prior exam.  No significant plaque   formation is noted. There is no evidence of hemodynamically significant   stenosis. Bilateral carotid and vertebral arteries Doppler ultrasound is performed using grayscale, color Doppler and pulse-wave Doppler. Indication: 57-year-old female with a history of cerebral infarction, meralgia paresthetica, memory loss, hyperlipidemia, hypothyroidism, no vertebral basilar insufficiency and history of prior TIAs. Comparison: Prior carotid ultrasound from 2/16/09. Findings: Mild intimal thickening and slight mostly non-echogenic plaque identified. Left carotid artery:      1. The maximum peak systolic velocity in the proximal CCA is 92 cm/s, mid 66cm/s and distal 65cm/s. There is no diameter reduction. 2. The maximum peak systolic velocity in the proximal ICA 55cm/s, mid ICA 69 cm/s and distal ICA 114cm/s. There is no diameter reduction at the proximal ICA. 3. The maximum peak systolic velocity in the left external carotid artery is 110 cm/s. ICA/CCA =0.8. Right carotid artery:      1. The maximum peak systolic velocity in the proximal CCA is 93 cm/s, mid 64 cm/s and distal 65 cm/s. There is no diameter reduction at the right carotid bulb. 2. The maximum peak systolic velocity in the proximal [id number], mid ICA 64 cm/s and distal ICA 68cm/s. There is no diameter reduction at the proximal ICA. 3. The maximum peak systolic velocity in the left external carotid artery is 64 cm/s. ICA/CCA= 1.2. There is evidence of bilateral antegrade flow in the vertebral arteries.       Impression: No carotid artery stenosis or significant plaque disease. Stable examination compared to 2009. Final report electronically signed by Aditi Hartmann M.D. on 12/4/2015        . Plan:          VISITING DIAGNOSIS:    No diagnosis found. CONCERNS   &   INCREASED   RISK   FOR         * TIA,  CEREBRO  VASCULAR  ISCHEMIA, STROKE       *   COGNITIVE  &   MEMORY PROBLEMS  AND  DEMENTIA                 VARIOUS  RISK   FACTORS   WERE  REVIEWED   AND   DISCUSSED. PLAN:       [place]  Of  The  Diagnoses,  The  Management & Treatment  Options           AND    Care  plan  Were        Reviewed and   Discussed   With  patient. * Goals  And  Expectations  Of  The  Therapy  Discussed   And  Reviewed. *   Benefits   And   Side  Effect  Profile  Of  Medication/s   Were   Discussed             * Need   For  Further   Follow up For  The  Various  Problems  Were discussed. * Results  Of  The  Previous  Diagnostic tests were reviewed and questions answered. patient  understand the same. Medical  Decision  Making  Was  Made  Based on the   Complexity  Of  Above  Mentioned  Diagnoses,        Data reviewed   & diagnostic  Tests  Reviewed,  Risk  Of  Significant   Co morbidities and complicating   Factors. Medical  Decision  Was   High  Complexity  Due   To  The  Patient's  Multiple  Symptoms,  Advancing   Disease,      Complex  Treatment  Regimen,  Multiple medications and   Risk  Of   Side  Effects,  Difficulty  In  Medication  Management      And  Diagnostic  Challenges   In  View  Of  The  Associated   Co  Morbid  Conditions   And  Problems. * FALL   PRECAUTIONS. *   BE  CAREFUL  WITH  ACTIVITIES         *   ADEQUATE   FLUID  INTAKE   AND  ELECTROLYTE  BALANCE         * KEEP  DAIRY  OF   THE  NEUROLOGICAL  SYMPTOMS        RECORDING THE    DURATION  AND  FREQUENCY. *  AVOID    CONDITIONS  AND  FACTORS   THAT  MAKE   NEUROLOGICAL  SYMPTOMS  WORSE.              * TO  MAINTAIN  REGULAR  SLEEP  WAKE  CYCLES. *   TO  HAVE  ADEQUATE  REST  AND   SLEEP    HOURS.          *    TO   AVOID   TO  SLEEP  IN   SUPINE  POSITION. *      WEIGHT   LOSS. *    AVOID  ANY USAGE OF                   TOBACCO,  EXCESSIVE  ALCOHOL  AND   ILLEGAL   SUBSTANCES          *  CONTINUE MEDICATIONS PRESCRIBED BY NEUROLOGIST AS    RECOMMENDED     *   Compliance   With  Medications   And  Instructions      * CURRENTLY  TOLERATING  THE  PRESCRIBED   MEDICATIONS. WITHOUT  ANY  SIGNIFICANT  SIDE  EFFECTS   &  GETTING BENEFIT. *  MILD  SHORT TERM  MEMORY PROBLEMS    FOR    2  YEARS              PATIENT  NOT  CONCERNED. * PATIENT  AWARE  OF  VARIOUS  RISK  FACTORS  FOR  TIA  /  STROKE                    SAME  REVIEWED,  DISCUSSED   WITH  PATIENT  IN  DETAIL. *  May   Use  Pill  Box,    If  Needed      *    To  Continue  The    Antiplatelet  therapy    As   Recommended  Was   Discussed              *    Prophylactic  Use   Of     Vitamin   B   Complex,  Folic  Acid,    Vitamin  B12    Multivitamin       Tablet  Daily    Supplementations   Over  The  Counter  Discussed           *  PATIENT  IS  ALSO   COUNSELED   TO  KEEP    ACTIVITIES         A)   SIMPLE      B)  ORGANIZED      C)  WRITE   DOWN                                  Orders Placed This Encounter   Procedures    VL DUP CAROTID BILATERAL    VL TRANSCRANIAL DOPPLER COMPLETE                 *PATIENT   TO  FOLLOW  UP  WITH   PRIMARY  CARE   AND   OTHER  CONSULTANTS  AS  BEFORE. *  Maintain   Healthy  Life Style    With   Periodic  Monitoring  Of      Any  Medical  Conditions  Including   Elevated  Blood  Pressure,  Lipid  Profile,     Blood  Sugar levels  And   Heart  Disease. *   Period   Screening  For  Cancers  Involving  Breast,  Colon,     lungs  And  Other  Organs  As  Applicable,  In consultation   With  Your  Primary Care Providers.               * Second Neurological  Opinion  And  Evaluations  In  [facility] AND [facility]  Setting  If  Patient  Is  Interested. *  If  The  Patient remains  Neurologically  Stable   Return   To  [facility] Neurology Department       IN     6    MONTHS  TIME   FOR  FURTHER  FOLLOW UP.                 *  If   There is  Any  Significant  Worsening   Of  Current  Symptoms  And  Or  If patient  Develops       Any additional  New  Neurological  Symptoms  Or  Significant  Concerns   Should  Call  911 or      Go  To  Emergency  Department  For  Further  Immediate  Evaluation. *   The  Neurological   Findings,  Possible  Diagnosis,  Differential diagnoses   And  Options      For    Further   Investigations   And  management   Are  Discussed  Comprehensively. Medications   And  Prescription   Risks  And  Side effects  Are   Also  Discussed. The  Above  Were  Reviewed  With  patient and     questions  Answered  In  Detail. More   Than   50% of face  To face Time   Was  Spent  On  Counseling   And   Coordination  Of  Care       Of   Patient's multiple   Neurological  Problems   And   Comorbid  Medical   Conditions. Electronically signed by Tamera Schmid MD.,  Jeevan Jin        Board Certified in  Neurology &  In  [facility] of Psychiatry and Neurology (ABPN)      DISCLAIMER:   Although every effort was made to ensure the accuracy of this  electronic transcription, some errors in transcription may have occurred. GENERAL PATIENT INSTRUCTIONS:     A Healthy Lifestyle: Care Instructions  Your Care Instructions  A healthy lifestyle can help you feel good, stay at a healthy weight, and have plenty of energy for both work and play. A healthy lifestyle is something you can share with your whole family.   A healthy lifestyle also can lower your risk for serious health problems, such as high blood pressure, heart disease, and diabetes. You can follow a few steps listed below to improve your health and the health of your family. Follow-up care is a key part of your treatment and safety. Be sure to make and go to all appointments, and call your doctor if you are having problems. Its also a good idea to know your test results and keep a list of the medicines you take. How can you care for yourself at home? Do not eat too much sugar, fat, or fast foods. You can still have dessert and treats now and then. The goal is moderation. Start small to improve your eating habits. Pay attention to portion sizes, drink less juice and soda pop, and eat more fruits and vegetables. Eat a healthy amount of food. A 3-ounce serving of meat, for example, is about the size of a deck of cards. Fill the rest of your plate with vegetables and whole grains. Limit the amount of soda and sports drinks you have every day. Drink more water when you are thirsty. Eat at least 5 servings of fruits and vegetables every day. It may seem like a lot, but it is not hard to reach this goal. A serving or helping is 1 piece of fruit, 1 cup of vegetables, or 2 cups of leafy, raw vegetables. Have an apple or some carrot sticks as an afternoon snack instead of a candy bar. Try to have fruits and/or vegetables at every meal.  Make exercise part of your daily routine. You may want to start with simple activities, such as walking, bicycling, or slow swimming. Try to be active 30 to 60 minutes every day. You do not need to do all 30 to 60 minutes all at once. For example, you can exercise 3 times a day for 10 or 20 minutes. Moderate exercise is safe for most people, but it is always a good idea to talk to your doctor before starting an exercise program.  Keep moving. Luke Knuckles the lawn, work in the garden, or [employer]. Take the stairs instead of the elevator at work. If you smoke, quit.  People who smoke have an increased risk for heart attack, stroke, cancer, and other lung illnesses. Quitting is hard, but there are ways to boost your chance of quitting tobacco for good. Use nicotine gum, patches, or lozenges. Ask your doctor about stop-smoking programs and medicines. Keep trying. In addition to reducing your risk of diseases in the future, you will notice some benefits soon after you stop using tobacco. If you have shortness of breath or asthma symptoms, they will likely get better within a few weeks after you quit. Limit how much alcohol you drink. Moderate amounts of alcohol (up to 2 drinks a day for men, 1 drink a day for women) are okay. But drinking too much can lead to liver problems, high blood pressure, and other health problems. Family health  If you have a family, there are many things you can do together to improve your health. Eat meals together as a family as often as possible. Eat healthy foods. This includes fruits, vegetables, lean meats and dairy, and whole grains. Include your family in your fitness plan. Most people think of activities such as jogging or tennis as the way to fitness, but there are many ways you and your family can be more active. Anything that makes you breathe hard and gets your heart pumping is exercise. Here are some tips:  Walk to do errands or to take your child to school or the bus. Go for a family bike ride after dinner instead of watching TV. Where can you learn more? Go to https://Yaoota.comjimi.healthZenith [employer]etics. org and sign in to your [employer] account. Enter W682 in the [facility] box to learn more about \"A Healthy Lifestyle: Care Instructions. \"     If you do not have an account, please click on the \"Sign Up Now\" link. Current as of: July 26, 2016  Content Version: 11.2  © 5193-1860 [employer]. Care instructions adapted under license by [facility] ([facility]).  If you have questions about a medical condition or this instruction, always ask your healthcare professional. [address] any warranty or liability for your use of this information.

## 2022-12-16 ENCOUNTER — OFFICE VISIT (OUTPATIENT)
Dept: PODIATRY | Age: 72
End: 2022-12-16
Payer: MEDICARE

## 2022-12-16 VITALS
WEIGHT: 196.6 LBS | RESPIRATION RATE: 20 BRPM | BODY MASS INDEX: 37.15 KG/M2 | SYSTOLIC BLOOD PRESSURE: 116 MMHG | DIASTOLIC BLOOD PRESSURE: 68 MMHG | HEART RATE: 72 BPM

## 2022-12-16 DIAGNOSIS — M79.671 RIGHT FOOT PAIN: ICD-10-CM

## 2022-12-16 DIAGNOSIS — L84 CORNS AND CALLOSITIES: ICD-10-CM

## 2022-12-16 DIAGNOSIS — M21.611 BUNION OF RIGHT FOOT: Primary | ICD-10-CM

## 2022-12-16 PROCEDURE — G8417 CALC BMI ABV UP PARAM F/U: HCPCS | Performed by: PODIATRIST

## 2022-12-16 PROCEDURE — G8427 DOCREV CUR MEDS BY ELIG CLIN: HCPCS | Performed by: PODIATRIST

## 2022-12-16 PROCEDURE — 1090F PRES/ABSN URINE INCON ASSESS: CPT | Performed by: PODIATRIST

## 2022-12-16 PROCEDURE — 99213 OFFICE O/P EST LOW 20 MIN: CPT | Performed by: PODIATRIST

## 2022-12-16 PROCEDURE — G8399 PT W/DXA RESULTS DOCUMENT: HCPCS | Performed by: PODIATRIST

## 2022-12-16 PROCEDURE — G8484 FLU IMMUNIZE NO ADMIN: HCPCS | Performed by: PODIATRIST

## 2022-12-16 PROCEDURE — 1036F TOBACCO NON-USER: CPT | Performed by: PODIATRIST

## 2022-12-16 PROCEDURE — 3017F COLORECTAL CA SCREEN DOC REV: CPT | Performed by: PODIATRIST

## 2022-12-16 PROCEDURE — 1123F ACP DISCUSS/DSCN MKR DOCD: CPT | Performed by: PODIATRIST

## 2022-12-16 NOTE — PROGRESS NOTES
Subjective:  Beth Smith is a 74 y.o. female who presents to the office today complaining of right foot bunion. Patient interested in x-ray results. Patient states her foot is felt a lot better since had the callus cleaned up. Patient ordered the urea cream to get this started. Currently denies F/C/N/V. Allergies   Allergen Reactions    Sulfamethoxazole-Trimethoprim Swelling     lips    Penicillins Rash       Past Medical History:   Diagnosis Date    Acute confusion     CVA (cerebral infarction)     Hyperlipidemia     Hypothyroidism     Lacunar infarction ([address]     Memory loss due to medical condition     Meralgia paresthetica     left side    Myopia with astigmatism and presbyopia     Perennial allergic rhinitis     RLS (restless legs syndrome)     Status post biopsy of skin 09/2022    TIA (transient ischemic attack) 2008, 1/06/2014    with brief confusion    VBI (vertebrobasilar insufficiency)        Prior to Admission medications    Medication Sig Start Date End Date Taking? Authorizing Provider   clopidogrel (PLAVIX) 75 MG tablet take 1 tablet by mouth once daily 12/5/22  Yes Rosalia Elam MD   rOPINIRole (REQUIP) 1 MG tablet take 1 tablet by mouth at bedtime 12/5/22  Yes Rosalia Elam MD   levothyroxine (SYNTHROID) 125 MCG tablet take 1 tablet by mouth once daily 11/1/22  Yes Kayla Vergara MD   metoprolol tartrate (LOPRESSOR) 25 MG tablet Take 0.5 tablets by mouth 2 times daily 10/3/22  Yes Kemal Red DO   atorvastatin (LIPITOR) 10 MG tablet take 1 tablet by mouth once daily 8/22/22  Yes Eder Duarte MD   nystatin (MYCOSTATIN) 210267 UNIT/GM powder Apply 3 times daily.  4/29/22  Yes Kayla Vergara MD   clotrimazole-betamethasone (LOTRISONE) 1-0.05 % cream Apply topically 2 times daily prn. 2/3/21  Yes ARCENIO Duran   calcium carbonate (OSCAL) 500 MG TABS tablet Take 500 mg by mouth daily   Yes Historical Provider, MD   fluticasone (FLONASE) 50 MCG/ACT nasal spray 2 sprays to each nostril once daily. Patient taking differently: as needed 2 sprays to each nostril once daily. 17  Yes Ej Rom, APRN - CNP   aspirin 81 MG tablet Take 81 mg by mouth daily. Yes Historical Provider, MD   Vitamin D (CHOLECALCIFEROL) 1000 UNITS CAPS capsule Take 3,000 Units by mouth daily    Yes Historical Provider, MD   Multiple Vitamin (MULTI-VITAMINS PO) Take 1 tablet by mouth Daily. Yes Historical Provider, MD       Past Surgical History:   Procedure Laterality Date    CARDIAC CATHETERIZATION  2014    [facility]   nl cath    CATARACT REMOVAL Bilateral     COLONOSCOPY  2015    mild diverticulosis:  Kari BERNARD    INSERTABLE CARDIAC MONITOR  16    removed    INTRACAPSULAR CATARACT EXTRACTION Right 2020    Right Cataract Extraction w/ IOL Implant performed by Laura Santiago MD at [address] [place] [address] Left 10/27/2020    Left Cataract Extraction w/ IOL Implant performed by Laura Santiago MD at [place] / [address] [place] [place] [address]      hemangioma removal on right foot 2nd toe    [address]       Family History   Problem Relation Age of Onset    Stroke Mother     Other Mother          from pneumonia    Liver Disease Father         due to alcohol    Heart Disease Father         valve disease    Diabetes Father     Thyroid Disease Sister     Cancer Sister         breast cancer older sister    Breast Cancer Sister     Diabetes Brother     Breast Cancer Maternal Grandmother     High Cholesterol Sister     Diabetes Brother     Other Paternal Aunt         late onset alzheimers    Glaucoma Neg Hx     Cataracts Neg Hx        Social History     Tobacco Use    Smoking status: Never    Smokeless tobacco: Never   Substance Use Topics    Alcohol use: No       ROS: All 14 ROS systems reviewed and pertinent positives noted above, all others negative.     Lower Extremity Physical Examination: test reviewed. Low risk morbidity at this time. Patient was advised if condition would worsen or progress evaluated activities that modified Barnes bunionectomy should give relief of pain. Patient was briefly educated on the procedure preop and postoperative courses will discuss further if condition would progress in the wrong direction down the road. Contact office with any questions/problems/concerns. RTC in when callous returns.

## 2023-01-24 ENCOUNTER — OFFICE VISIT (OUTPATIENT)
Dept: CARDIOLOGY | Age: 73
End: 2023-01-24
Payer: MEDICARE

## 2023-01-24 VITALS
DIASTOLIC BLOOD PRESSURE: 70 MMHG | WEIGHT: 191 LBS | BODY MASS INDEX: 36.06 KG/M2 | SYSTOLIC BLOOD PRESSURE: 110 MMHG | HEIGHT: 61 IN

## 2023-01-24 DIAGNOSIS — R94.31 ABNORMAL ECG: ICD-10-CM

## 2023-01-24 DIAGNOSIS — E03.9 ACQUIRED HYPOTHYROIDISM: ICD-10-CM

## 2023-01-24 DIAGNOSIS — I10 HYPERTENSION, ESSENTIAL: Primary | ICD-10-CM

## 2023-01-24 DIAGNOSIS — I63.89 CEREBROVASCULAR ACCIDENT (CVA) DUE TO OTHER MECHANISM (HCC): ICD-10-CM

## 2023-01-24 DIAGNOSIS — Z98.890 S/P CARDIAC CATH: ICD-10-CM

## 2023-01-24 DIAGNOSIS — E78.00 PURE HYPERCHOLESTEROLEMIA: ICD-10-CM

## 2023-01-24 DIAGNOSIS — E66.09 CLASS 1 OBESITY DUE TO EXCESS CALORIES WITH SERIOUS COMORBIDITY IN ADULT, UNSPECIFIED BMI: ICD-10-CM

## 2023-01-24 DIAGNOSIS — G45.9 TIA (TRANSIENT ISCHEMIC ATTACK): ICD-10-CM

## 2023-01-24 PROCEDURE — 1090F PRES/ABSN URINE INCON ASSESS: CPT | Performed by: INTERNAL MEDICINE

## 2023-01-24 PROCEDURE — 93005 ELECTROCARDIOGRAM TRACING: CPT | Performed by: INTERNAL MEDICINE

## 2023-01-24 PROCEDURE — G8484 FLU IMMUNIZE NO ADMIN: HCPCS | Performed by: INTERNAL MEDICINE

## 2023-01-24 PROCEDURE — G8417 CALC BMI ABV UP PARAM F/U: HCPCS | Performed by: INTERNAL MEDICINE

## 2023-01-24 PROCEDURE — 3017F COLORECTAL CA SCREEN DOC REV: CPT | Performed by: INTERNAL MEDICINE

## 2023-01-24 PROCEDURE — 1123F ACP DISCUSS/DSCN MKR DOCD: CPT | Performed by: INTERNAL MEDICINE

## 2023-01-24 PROCEDURE — G8427 DOCREV CUR MEDS BY ELIG CLIN: HCPCS | Performed by: INTERNAL MEDICINE

## 2023-01-24 PROCEDURE — 99214 OFFICE O/P EST MOD 30 MIN: CPT | Performed by: INTERNAL MEDICINE

## 2023-01-24 PROCEDURE — 1036F TOBACCO NON-USER: CPT | Performed by: INTERNAL MEDICINE

## 2023-01-24 PROCEDURE — 3074F SYST BP LT 130 MM HG: CPT | Performed by: INTERNAL MEDICINE

## 2023-01-24 PROCEDURE — 3078F DIAST BP <80 MM HG: CPT | Performed by: INTERNAL MEDICINE

## 2023-01-24 PROCEDURE — 93010 ELECTROCARDIOGRAM REPORT: CPT | Performed by: INTERNAL MEDICINE

## 2023-01-24 PROCEDURE — G8399 PT W/DXA RESULTS DOCUMENT: HCPCS | Performed by: INTERNAL MEDICINE

## 2023-01-24 PROCEDURE — 99212 OFFICE O/P EST SF 10 MIN: CPT | Performed by: INTERNAL MEDICINE

## 2023-01-24 NOTE — PROGRESS NOTES
Today's Date: 1/24/2023  Patient's Name: Amalia Ram  Patient's age: 74 y.o., 1950    CC: HTN, DL    HPI:  The patient is a 74y.o. year old, , female is in the office for F/U. No chest pains, no sob, no palpitations, no le edema, no orthopnea, no PND. Does lots of walking everyday walks to [employer]. Usually 30 minutes a day. Does aerobics 3x/week. Past Medical History:   has a past medical history of Acute confusion, CVA (cerebral infarction), Hyperlipidemia, Hypothyroidism, Lacunar infarction ([facility] [address] Memory loss due to medical condition, Meralgia paresthetica, Myopia with astigmatism and presbyopia, Perennial allergic rhinitis, RLS (restless legs syndrome), Status post biopsy of skin, TIA (transient ischemic attack), and VBI (vertebrobasilar insufficiency). Past Surgical History:   has a past surgical history that includes pre-malignant / benign skin lesion excision; [place] tooth extraction (1981); Cardiac catheterization (9/2014); Colonoscopy (02/25/2015); Insertable Cardiac Monitor (4/22/16); Intracapsular cataract extraction (Right, 9/29/2020); Intracapsular cataract extraction (Left, 10/27/2020); and Cataract removal (Bilateral, 2020). Home Medications:  Prior to Admission medications    Medication Sig Start Date End Date Taking? Authorizing Provider   clopidogrel (PLAVIX) 75 MG tablet take 1 tablet by mouth once daily 12/5/22  Yes Mayra Ornelas MD   rOPINIRole (REQUIP) 1 MG tablet take 1 tablet by mouth at bedtime 12/5/22  Yes Mayra Ornelas MD   levothyroxine (SYNTHROID) 125 MCG tablet take 1 tablet by mouth once daily 11/1/22  Yes Rui Hall MD   metoprolol tartrate (LOPRESSOR) 25 MG tablet Take 0.5 tablets by mouth 2 times daily 10/3/22  Yes Kemal Red DO   atorvastatin (LIPITOR) 10 MG tablet take 1 tablet by mouth once daily 8/22/22  Yes Vladimir Louis MD   nystatin (MYCOSTATIN) 972758 UNIT/GM powder Apply 3 times daily.  4/29/22  Yes Rui Hall MD clotrimazole-betamethasone (LOTRISONE) 1-0.05 % cream Apply topically 2 times daily prn. 2/3/21  Yes ARCENIO Ramirez   calcium carbonate (OSCAL) 500 MG TABS tablet Take 500 mg by mouth daily   Yes Historical Provider, MD   fluticasone (FLONASE) 50 MCG/ACT nasal spray 2 sprays to each nostril once daily. Patient taking differently: as needed 2 sprays to each nostril once daily. 2/22/17  Yes NORMAN Joseph CNP   aspirin 81 MG tablet Take 81 mg by mouth daily. Yes Historical Provider, MD   Vitamin D (CHOLECALCIFEROL) 1000 UNITS CAPS capsule Take 3,000 Units by mouth daily    Yes Historical Provider, MD   Multiple Vitamin (MULTI-VITAMINS PO) Take 1 tablet by mouth Daily. Yes Historical Provider, MD     Allergies:  Sulfamethoxazole-trimethoprim and Penicillins    Social History:   reports that she has never smoked. She has never used smokeless tobacco. She reports that she does not drink alcohol and does not use drugs. Review of Systems:  Constitutional: there has been no unanticipated weight loss. There's been No change in energy level, No change in activity level. Eyes: No visual changes or diplopia. No scleral icterus. ENT: No Headaches, hearing loss or vertigo. No mouth sores or sore throat. Cardiovascular: As above. Respiratory: as hpi  Gastrointestinal: No abdominal pain, appetite loss, blood in stools. No change in bowel or bladder habits. Genitourinary: No dysuria, trouble voiding, or hematuria. Musculoskeletal:  No gait disturbance, No weakness or joint complaints. Integumentary: No rash or pruritis. Neurological: No headache, diplopia, change in muscle strength, numbness or tingling. No change in gait, balance, coordination, mood, affect, memory, mentation, behavior. Psychiatric: No anxiety, or depression. Endocrine: No temperature intolerance. No excessive thirst, fluid intake, or urination. No tremor.   Hematologic/Lymphatic: No abnormal bruising or bleeding, blood clots or swollen lymph nodes. Allergic/Immunologic: No nasal congestion or hives. Physical Exam:  /70   Ht 5' 1\" (1.549 m)   Wt 191 lb (86.6 kg)   LMP 01/01/1998   BMI 36.09 kg/m²   General appearance: alert and cooperative with exam  HEENT: Head: Normocephalic, no lesions, without obvious abnormality. Eyes: PERRL, EOMI  Ears: Not obvious deformations or lack of hearing  Neck: no carotid bruit, no JVD  Lungs: clear to auscultation bilaterally  Heart: regular rate and rhythm, S1, S2 normal, no murmur, click, rub or gallop  Abdomen: soft, non-tender; bowel sounds normal; no masses,  no organomegaly  Extremities: extremities normal, atraumatic, no cyanosis or edema  Neurologic: Mental status: Alert, oriented, thought content appropriate  Skin: WNL for age and condition, no obvious rashes or leasions    Labs:   Lab Results   Component Value Date    CHOL 155 11/01/2022    TRIG 126 11/01/2022    HDL 47 11/01/2022    LDLCHOLESTEROL 83 11/01/2022    VLDL NOT REPORTED (H) 10/29/2021    CHOLHDLRATIO 3.3 11/01/2022     Lab Results   Component Value Date     11/01/2022    K 4.6 11/01/2022     11/01/2022    CO2 29 11/01/2022    BUN 19 11/01/2022    CREATININE 0.71 11/01/2022    GLUCOSE 102 (H) 11/01/2022    CALCIUM 9.9 11/01/2022    PROT 7.5 11/01/2022    LABALBU 4.2 11/01/2022    BILITOT 0.8 11/01/2022    ALKPHOS 102 11/01/2022    AST 18 11/01/2022    ALT 16 11/01/2022    LABGLOM >60 11/01/2022    GFRAA >60 04/30/2022     Cardiac Data:  Diagnostics:    EKG: Sinus  Rhythm   Low voltage -possible pulmonary disease. Echo 1/21-   Normal LV size and function. Ejection fraction is 60%. Normal LA and right cavities size. Normal function of all valves. No evidence of pericardial effusion. No evidence of thrombus in the heart cavities. Assessment and plan:    Abnormal ECG- previously possible old inferior, now no evidence of inferior MI. Denies any CP, is staying active with no complaints. Excellent functional capacity. Doing very well. H/o Normal coronaries on cardiac cath 09/2014  HTN: well controlled, stable, chronic  HLP: on statin, stable, chronic  Preserved LV systolic function on Echo 1/21. stable, chronic  Loop recorder placed March 27, 2014 and extracted 04/2016 with no documented arrhythmias. Obesity- diet, exercise, weight loss. CVA/TIA on 1/14/14 at hospital: on ASA and Plavix. Doing well, stable, chronic  Hypothyroidism. On meds, stable, chronic    The patient is to continue heart healthy diet, weight loss and exercise as tolerated. Patient's medications and side effects were discussed. Medication refills were provided if needed. Follow up appointment timing was discussed. All questions and concerns were addressed to patient's satisfaction. The patient is to follow up in 12 months or sooner if necessary. Thank you for allowing me to participate in the care of this patient, please do not hesitate to call if you have any questions. Ruby Licea, [id number] [facility]  [facility]. [facility]  [phone]

## 2023-05-01 ENCOUNTER — HOSPITAL ENCOUNTER (OUTPATIENT)
Age: 73
Discharge: HOME | End: 2023-05-01
Payer: MEDICARE

## 2023-05-01 DIAGNOSIS — E78.2 MIXED HYPERLIPIDEMIA: ICD-10-CM

## 2023-05-01 DIAGNOSIS — E55.9 VITAMIN D DEFICIENCY: ICD-10-CM

## 2023-05-01 DIAGNOSIS — E03.9 ACQUIRED HYPOTHYROIDISM: ICD-10-CM

## 2023-05-01 DIAGNOSIS — R73.01 ELEVATED FASTING GLUCOSE: ICD-10-CM

## 2023-05-01 LAB
25(OH)D3 SERPL-MCNC: 47.8 NG/ML
ALBUMIN SERPL-MCNC: 4 G/DL (ref 3.5–5.2)
ALBUMIN/GLOBULIN RATIO: 1.3 (ref 1–2.5)
ALP SERPL-CCNC: 106 U/L (ref 35–104)
ALT SERPL-CCNC: 16 U/L (ref 5–33)
ANION GAP SERPL CALCULATED.3IONS-SCNC: 9 MMOL/L (ref 9–17)
AST SERPL-CCNC: 18 U/L
BILIRUB SERPL-MCNC: 0.9 MG/DL (ref 0.3–1.2)
BUN SERPL-MCNC: 15 MG/DL (ref 8–23)
BUN/CREAT BLD: 21 (ref 9–20)
CALCIUM SERPL-MCNC: 9.8 MG/DL (ref 8.6–10.4)
CHLORIDE SERPL-SCNC: 104 MMOL/L (ref 98–107)
CHOLEST SERPL-MCNC: 168 MG/DL
CHOLESTEROL/HDL RATIO: 3.7
CO2 SERPL-SCNC: 28 MMOL/L (ref 20–31)
CREAT SERPL-MCNC: 0.7 MG/DL (ref 0.5–0.9)
EST. AVERAGE GLUCOSE BLD GHB EST-MCNC: 126 MG/DL
GFR SERPL CREATININE-BSD FRML MDRD: >60 ML/MIN/1.73M2
GLUCOSE SERPL-MCNC: 112 MG/DL (ref 70–99)
HBA1C MFR BLD: 6 % (ref 4–6)
HDLC SERPL-MCNC: 46 MG/DL
LDLC SERPL CALC-MCNC: 98 MG/DL (ref 0–130)
POTASSIUM SERPL-SCNC: 4.3 MMOL/L (ref 3.7–5.3)
PROT SERPL-MCNC: 7.2 G/DL (ref 6.4–8.3)
SODIUM SERPL-SCNC: 141 MMOL/L (ref 135–144)
T4 FREE SERPL-MCNC: 1.5 NG/DL (ref 0.9–1.7)
TRIGL SERPL-MCNC: 122 MG/DL
TSH SERPL-ACNC: 5.35 UIU/ML (ref 0.3–5)

## 2023-05-01 PROCEDURE — 84443 ASSAY THYROID STIM HORMONE: CPT

## 2023-05-01 PROCEDURE — 82306 VITAMIN D 25 HYDROXY: CPT

## 2023-05-01 PROCEDURE — 36415 COLL VENOUS BLD VENIPUNCTURE: CPT

## 2023-05-01 PROCEDURE — 80061 LIPID PANEL: CPT

## 2023-05-01 PROCEDURE — 84439 ASSAY OF FREE THYROXINE: CPT

## 2023-05-01 PROCEDURE — 80053 COMPREHEN METABOLIC PANEL: CPT

## 2023-05-01 PROCEDURE — 83036 HEMOGLOBIN GLYCOSYLATED A1C: CPT

## 2023-05-09 ENCOUNTER — OFFICE VISIT (OUTPATIENT)
Dept: FAMILY MEDICINE CLINIC | Age: 73
End: 2023-05-09
Payer: MEDICARE

## 2023-05-09 VITALS
DIASTOLIC BLOOD PRESSURE: 74 MMHG | OXYGEN SATURATION: 94 % | TEMPERATURE: 98.1 F | SYSTOLIC BLOOD PRESSURE: 116 MMHG | HEART RATE: 76 BPM | WEIGHT: 180 LBS | HEIGHT: 61 IN | BODY MASS INDEX: 33.99 KG/M2

## 2023-05-09 DIAGNOSIS — I10 ESSENTIAL HYPERTENSION: Primary | ICD-10-CM

## 2023-05-09 DIAGNOSIS — R73.01 ELEVATED FASTING GLUCOSE: ICD-10-CM

## 2023-05-09 DIAGNOSIS — E78.2 MIXED HYPERLIPIDEMIA: ICD-10-CM

## 2023-05-09 DIAGNOSIS — E03.9 ACQUIRED HYPOTHYROIDISM: ICD-10-CM

## 2023-05-09 PROCEDURE — 3074F SYST BP LT 130 MM HG: CPT | Performed by: FAMILY MEDICINE

## 2023-05-09 PROCEDURE — 1123F ACP DISCUSS/DSCN MKR DOCD: CPT | Performed by: FAMILY MEDICINE

## 2023-05-09 PROCEDURE — G8417 CALC BMI ABV UP PARAM F/U: HCPCS | Performed by: FAMILY MEDICINE

## 2023-05-09 PROCEDURE — 1090F PRES/ABSN URINE INCON ASSESS: CPT | Performed by: FAMILY MEDICINE

## 2023-05-09 PROCEDURE — 1036F TOBACCO NON-USER: CPT | Performed by: FAMILY MEDICINE

## 2023-05-09 PROCEDURE — G8427 DOCREV CUR MEDS BY ELIG CLIN: HCPCS | Performed by: FAMILY MEDICINE

## 2023-05-09 PROCEDURE — 99214 OFFICE O/P EST MOD 30 MIN: CPT | Performed by: FAMILY MEDICINE

## 2023-05-09 PROCEDURE — 3078F DIAST BP <80 MM HG: CPT | Performed by: FAMILY MEDICINE

## 2023-05-09 PROCEDURE — 3017F COLORECTAL CA SCREEN DOC REV: CPT | Performed by: FAMILY MEDICINE

## 2023-05-09 PROCEDURE — G8399 PT W/DXA RESULTS DOCUMENT: HCPCS | Performed by: FAMILY MEDICINE

## 2023-05-09 RX ORDER — LEVOTHYROXINE SODIUM 137 UG/1
TABLET ORAL
Qty: 30 TABLET | Refills: 3 | Status: SHIPPED | OUTPATIENT
Start: 2023-05-09

## 2023-05-09 SDOH — ECONOMIC STABILITY: FOOD INSECURITY: WITHIN THE PAST 12 MONTHS, YOU WORRIED THAT YOUR FOOD WOULD RUN OUT BEFORE YOU GOT MONEY TO BUY MORE.: PATIENT DECLINED

## 2023-05-09 SDOH — ECONOMIC STABILITY: INCOME INSECURITY: HOW HARD IS IT FOR YOU TO PAY FOR THE VERY BASICS LIKE FOOD, HOUSING, MEDICAL CARE, AND HEATING?: PATIENT DECLINED

## 2023-05-09 SDOH — ECONOMIC STABILITY: FOOD INSECURITY: WITHIN THE PAST 12 MONTHS, THE FOOD YOU BOUGHT JUST DIDN'T LAST AND YOU DIDN'T HAVE MONEY TO GET MORE.: PATIENT DECLINED

## 2023-05-09 SDOH — ECONOMIC STABILITY: HOUSING INSECURITY
IN THE LAST 12 MONTHS, WAS THERE A TIME WHEN YOU DID NOT HAVE A STEADY PLACE TO SLEEP OR SLEPT IN A SHELTER (INCLUDING NOW)?: PATIENT REFUSED

## 2023-05-09 ASSESSMENT — PATIENT HEALTH QUESTIONNAIRE - PHQ9
SUM OF ALL RESPONSES TO PHQ9 QUESTIONS 1 & 2: 0
1. LITTLE INTEREST OR PLEASURE IN DOING THINGS: 0
SUM OF ALL RESPONSES TO PHQ QUESTIONS 1-9: 0
SUM OF ALL RESPONSES TO PHQ QUESTIONS 1-9: 0
2. FEELING DOWN, DEPRESSED OR HOPELESS: 0
SUM OF ALL RESPONSES TO PHQ QUESTIONS 1-9: 0
SUM OF ALL RESPONSES TO PHQ QUESTIONS 1-9: 0

## 2023-05-09 NOTE — PROGRESS NOTES
WILVER OLIVIA [address], [address] [place] [address] Drive                        Telephone [phone]             Fax [phone]       Tim Anderson  :  1950  Age:  74 y.o. MRN:  [id number]  Date of visit:  2023       Assessment and Plan:    1. Essential hypertension  Her blood pressure is very well-controlled today. (BP: 116/74)   She was advised to continue current medications. She states that she does not need a refill today. 2. Mixed hyperlipidemia  Her lipid profile was worse on her recent lab work. She is tolerating Lipitor well; this was refilled:   - Lipid Panel; Future    3. Acquired hypothyroidism  TSH was elevated and free T4 was within normal limits on her recent blood work. She was advised to increase her dose of Levothyroxine from 125 mcg to 137 mcg daily. Levothyroxine was refilled:   - levothyroxine (SYNTHROID) 137 MCG tablet; take 1 tablet by mouth once daily  Dispense: 30 tablet; Refill: 3    Labs were ordered to be done in approximately 6-8 weeks:  - T4, Free; Future  - TSH; Future    4. Elevated fasting glucose  Her fasting glucose is elevated, but her HgbA1c is within normal limits. Labs were ordered to be done prior to her return visit in 6 months:    - Comprehensive Metabolic Panel; Future  - Hemoglobin A1C; Future     5. Routine health maintenance  Health maintenance was reviewed with the patient. She has received four doses of the Moderna Covid-19 vaccine, including the updated bivalent vaccine. She was advised that she is eligible for another booster dose of the Covid vaccine. All other health maintenance, including Shingrix, Tdap, Pneumovax, and Prevnar-13, is up to date at this time. Follow up instructions were given to the patient:  Return in about 6 months (around 2023) for hypertension, hyperlipidemia, elevated glucose, hypothyroidism.

## 2023-05-26 DIAGNOSIS — G25.81 RLS (RESTLESS LEGS SYNDROME): ICD-10-CM

## 2023-05-26 DIAGNOSIS — I63.9 CEREBROVASCULAR ACCIDENT (CVA), UNSPECIFIED MECHANISM (HCC): ICD-10-CM

## 2023-05-26 RX ORDER — CLOPIDOGREL BISULFATE 75 MG/1
TABLET ORAL
Qty: 90 TABLET | Refills: 1 | Status: SHIPPED | OUTPATIENT
Start: 2023-05-26

## 2023-05-26 RX ORDER — ROPINIROLE 1 MG/1
TABLET, FILM COATED ORAL
Qty: 90 TABLET | Refills: 1 | Status: SHIPPED | OUTPATIENT
Start: 2023-05-26

## 2023-06-07 ENCOUNTER — HOSPITAL ENCOUNTER (OUTPATIENT)
Dept: INTERVENTIONAL RADIOLOGY/VASCULAR | Age: 73
Discharge: HOME | End: 2023-06-09
Payer: MEDICARE

## 2023-06-07 ENCOUNTER — HOSPITAL ENCOUNTER (OUTPATIENT)
Dept: NEUROLOGY | Age: 73
Discharge: HOME | End: 2023-06-07
Payer: MEDICARE

## 2023-06-07 DIAGNOSIS — G45.0 VBI (VERTEBROBASILAR INSUFFICIENCY): ICD-10-CM

## 2023-06-07 DIAGNOSIS — I63.00 CEREBROVASCULAR ACCIDENT (CVA) DUE TO THROMBOSIS OF PRECEREBRAL ARTERY (HCC): ICD-10-CM

## 2023-06-07 DIAGNOSIS — G45.9 TIA (TRANSIENT ISCHEMIC ATTACK): ICD-10-CM

## 2023-06-07 PROCEDURE — 93886 INTRACRANIAL COMPLETE STUDY: CPT

## 2023-06-07 PROCEDURE — 93880 EXTRACRANIAL BILAT STUDY: CPT

## 2023-06-07 PROCEDURE — 93892 TCD EMBOLI DETECT W/O INJ: CPT

## 2023-06-07 NOTE — PROGRESS NOTES
TCD Completed with Emboli Detection. PCP: Jaky Sorto MD    Ordering: Bisi Riley. Criss Neurologist    Interpreting Physician: Bisi Riley.  Elva Kahn    Electronically signed by Ramone Fiore RN on 6/7/2023 at 10:34 AM

## 2023-06-08 NOTE — PROCEDURES
values. TCD OF INTRACRANIAL ARTERIES FOR EMBOLI DETECTION:    Review and analysis of the waveforms and continuous soundtrack systems  during the current monitoring show no evidence of HITS (high intensity  transient signals) and no embolic shower events were detected. IMPRESSION:      1. There is hyperdynamic flow in the left anterior cerebral artery. 2.  Otherwise, no significant focal stenosis or flow abnormalities noted        in the anterior circulation. 3.  There is mild left vertebral artery stenosis. 4.  No significant basilar stenosis or insufficiency noted. 5.  TCD of intracranial arteries for emboli detection is negative. Further clinical correlation and followup recommended. Chikis Quesada MD, [address] [place] [address]     Board Certified in Neurology  & in  [id number] [address] [place] [address] W of Psychiatry and Neurology (ABPN).          D: 06/08/2023 10:24:15       T: 06/08/2023 11:26:42     [id number]  Job#: [id number]     Doc#: [id number]    CC:

## 2023-06-14 PROBLEM — Z86.73 H/O TIA (TRANSIENT ISCHEMIC ATTACK) AND STROKE: Status: ACTIVE | Noted: 2023-06-14

## 2023-06-26 ENCOUNTER — HOSPITAL ENCOUNTER (OUTPATIENT)
Age: 73
Discharge: HOME | End: 2023-06-26
Payer: MEDICARE

## 2023-06-26 DIAGNOSIS — E03.9 ACQUIRED HYPOTHYROIDISM: ICD-10-CM

## 2023-06-26 LAB
T4 FREE SERPL-MCNC: 1.6 NG/DL (ref 0.9–1.7)
TSH SERPL DL<=0.05 MIU/L-ACNC: 1.05 UIU/ML (ref 0.3–5)

## 2023-06-26 PROCEDURE — 84439 ASSAY OF FREE THYROXINE: CPT

## 2023-06-26 PROCEDURE — 84443 ASSAY THYROID STIM HORMONE: CPT

## 2023-06-26 PROCEDURE — 36415 COLL VENOUS BLD VENIPUNCTURE: CPT

## 2023-07-06 ENCOUNTER — OFFICE VISIT (OUTPATIENT)
Dept: CARDIOLOGY | Age: 73
End: 2023-07-06
Payer: MEDICARE

## 2023-07-06 VITALS
BODY MASS INDEX: 34.58 KG/M2 | DIASTOLIC BLOOD PRESSURE: 66 MMHG | SYSTOLIC BLOOD PRESSURE: 110 MMHG | HEART RATE: 78 BPM | WEIGHT: 183 LBS

## 2023-07-06 DIAGNOSIS — R94.31 ABNORMAL ECG: ICD-10-CM

## 2023-07-06 DIAGNOSIS — I63.89 CEREBROVASCULAR ACCIDENT (CVA) DUE TO OTHER MECHANISM (HCC): ICD-10-CM

## 2023-07-06 DIAGNOSIS — Z98.890 S/P CARDIAC CATH: Primary | ICD-10-CM

## 2023-07-06 PROCEDURE — 99212 OFFICE O/P EST SF 10 MIN: CPT | Performed by: SURGERY

## 2023-07-06 PROCEDURE — 93005 ELECTROCARDIOGRAM TRACING: CPT | Performed by: SURGERY

## 2023-07-06 NOTE — PROGRESS NOTES
Cardiology Consultation/Follow up  [address]      23      CC:  Patient is here for 6 month follow up     HPI:  Sandee Bales  is doing well from a cardiac standpoint. Good functional capacity with no significant change in functional capacity. No chest pain, no dyspnea, no PND, no syncope or pre-syncope, no orthopnea. No symptoms of CHF or angina/chest pain.  Very active states walking back home after appointment     Past Medical History:   Diagnosis Date    Acute confusion     CVA (cerebral infarction)     Hyperlipidemia     Hypothyroidism     Lacunar infarction ([address])     Memory loss due to medical condition     Meralgia paresthetica     left side    Myopia with astigmatism and presbyopia     Perennial allergic rhinitis     RLS (restless legs syndrome)     Status post biopsy of skin 2022    TIA (transient ischemic attack) , 2014    with brief confusion    VBI (vertebrobasilar insufficiency)        Past Surgical History:   Procedure Laterality Date    CARDIAC CATHETERIZATION  2014    [facility]   nl cath    CATARACT REMOVAL Bilateral     COLONOSCOPY  2015    mild diverticulosis:  Kari BERNARD    INSERTABLE CARDIAC MONITOR  14    removed    INTRACAPSULAR CATARACT EXTRACTION Right 2020    Right Cataract Extraction w/ IOL Implant performed by Clementina Robles MD at [address] [place] [address] Left 10/27/2020    Left Cataract Extraction w/ IOL Implant performed by Clementina Robles MD at [address] [place] [address] / [address] [place] [address]       hemangioma removal on right foot 2nd toe    [facility]       Family History   Problem Relation Age of Onset    Stroke Mother     Other Mother          from pneumonia    Liver Disease Father         due to alcohol    Heart Disease Father         valve disease    Diabetes Father     Thyroid Disease Sister     Cancer Sister         breast cancer older sister

## 2023-08-27 DIAGNOSIS — E03.9 ACQUIRED HYPOTHYROIDISM: ICD-10-CM

## 2023-08-28 RX ORDER — LEVOTHYROXINE SODIUM 137 UG/1
TABLET ORAL
Qty: 120 TABLET | Refills: 3 | Status: SHIPPED | OUTPATIENT
Start: 2023-08-28 | End: 2023-08-28 | Stop reason: CLARIF

## 2023-08-29 RX ORDER — LEVOTHYROXINE SODIUM 137 UG/1
137 TABLET ORAL DAILY
Qty: 90 TABLET | Refills: 0 | Status: SHIPPED | OUTPATIENT
Start: 2023-08-29

## 2023-10-06 DIAGNOSIS — Z12.31 SCREENING MAMMOGRAM FOR BREAST CANCER: Primary | ICD-10-CM

## 2023-10-31 ENCOUNTER — HOSPITAL ENCOUNTER (OUTPATIENT)
Age: 73
Discharge: HOME | End: 2023-10-31
Payer: MEDICARE

## 2023-10-31 DIAGNOSIS — E78.2 MIXED HYPERLIPIDEMIA: ICD-10-CM

## 2023-10-31 DIAGNOSIS — R73.01 ELEVATED FASTING GLUCOSE: ICD-10-CM

## 2023-10-31 LAB
ALBUMIN SERPL-MCNC: 4 G/DL (ref 3.5–5.2)
ALBUMIN/GLOB SERPL: 1.3 {RATIO} (ref 1–2.5)
ALP SERPL-CCNC: 90 U/L (ref 35–104)
ALT SERPL-CCNC: 12 U/L (ref 5–33)
ANION GAP SERPL CALCULATED.3IONS-SCNC: 8 MMOL/L (ref 9–17)
AST SERPL-CCNC: 18 U/L
BILIRUB SERPL-MCNC: 1.1 MG/DL (ref 0.3–1.2)
BUN SERPL-MCNC: 16 MG/DL (ref 8–23)
BUN/CREAT SERPL: 27 (ref 9–20)
CALCIUM SERPL-MCNC: 9.8 MG/DL (ref 8.6–10.4)
CHLORIDE SERPL-SCNC: 105 MMOL/L (ref 98–107)
CHOLEST SERPL-MCNC: 149 MG/DL
CHOLESTEROL/HDL RATIO: 3.2
CO2 SERPL-SCNC: 28 MMOL/L (ref 20–31)
CREAT SERPL-MCNC: 0.6 MG/DL (ref 0.5–0.9)
EST. AVERAGE GLUCOSE BLD GHB EST-MCNC: 126 MG/DL
GFR SERPL CREATININE-BSD FRML MDRD: >60 ML/MIN/1.73M2
GLUCOSE SERPL-MCNC: 111 MG/DL (ref 70–99)
HBA1C MFR BLD: 6 % (ref 4–6)
HDLC SERPL-MCNC: 47 MG/DL
LDLC SERPL CALC-MCNC: 74 MG/DL (ref 0–130)
POTASSIUM SERPL-SCNC: 4.1 MMOL/L (ref 3.7–5.3)
PROT SERPL-MCNC: 7.2 G/DL (ref 6.4–8.3)
SODIUM SERPL-SCNC: 141 MMOL/L (ref 135–144)
TRIGL SERPL-MCNC: 141 MG/DL

## 2023-10-31 PROCEDURE — 83036 HEMOGLOBIN GLYCOSYLATED A1C: CPT

## 2023-10-31 PROCEDURE — 80053 COMPREHEN METABOLIC PANEL: CPT

## 2023-10-31 PROCEDURE — 80061 LIPID PANEL: CPT

## 2023-10-31 PROCEDURE — 36415 COLL VENOUS BLD VENIPUNCTURE: CPT

## 2023-11-01 ENCOUNTER — HOSPITAL ENCOUNTER (OUTPATIENT)
Dept: MAMMOGRAPHY | Age: 73
Discharge: HOME | End: 2023-11-03
Payer: MEDICARE

## 2023-11-01 VITALS — HEIGHT: 61 IN | WEIGHT: 185 LBS | BODY MASS INDEX: 34.93 KG/M2

## 2023-11-01 DIAGNOSIS — Z12.31 SCREENING MAMMOGRAM FOR BREAST CANCER: ICD-10-CM

## 2023-11-01 PROCEDURE — 77063 BREAST TOMOSYNTHESIS BI: CPT

## 2023-11-09 ENCOUNTER — HOSPITAL ENCOUNTER (OUTPATIENT)
Dept: GENERAL RADIOLOGY | Age: 73
Discharge: HOME | End: 2023-11-11
Payer: MEDICARE

## 2023-11-09 ENCOUNTER — OFFICE VISIT (OUTPATIENT)
Dept: FAMILY MEDICINE CLINIC | Age: 73
End: 2023-11-09
Payer: MEDICARE

## 2023-11-09 VITALS
HEIGHT: 61 IN | OXYGEN SATURATION: 96 % | WEIGHT: 187 LBS | BODY MASS INDEX: 35.3 KG/M2 | SYSTOLIC BLOOD PRESSURE: 114 MMHG | DIASTOLIC BLOOD PRESSURE: 66 MMHG | HEART RATE: 79 BPM

## 2023-11-09 DIAGNOSIS — G89.29 CHRONIC PAIN OF LEFT KNEE: ICD-10-CM

## 2023-11-09 DIAGNOSIS — M25.562 CHRONIC PAIN OF LEFT KNEE: ICD-10-CM

## 2023-11-09 DIAGNOSIS — R73.01 ELEVATED FASTING GLUCOSE: ICD-10-CM

## 2023-11-09 DIAGNOSIS — I10 ESSENTIAL HYPERTENSION: Primary | ICD-10-CM

## 2023-11-09 DIAGNOSIS — E66.01 SEVERE OBESITY (BMI 35.0-39.9) WITH COMORBIDITY (HCC): ICD-10-CM

## 2023-11-09 DIAGNOSIS — R41.0 EPISODE OF CONFUSION: ICD-10-CM

## 2023-11-09 DIAGNOSIS — M79.671 RIGHT FOOT PAIN: ICD-10-CM

## 2023-11-09 DIAGNOSIS — E78.2 MIXED HYPERLIPIDEMIA: ICD-10-CM

## 2023-11-09 PROCEDURE — G8399 PT W/DXA RESULTS DOCUMENT: HCPCS | Performed by: FAMILY MEDICINE

## 2023-11-09 PROCEDURE — 3078F DIAST BP <80 MM HG: CPT | Performed by: FAMILY MEDICINE

## 2023-11-09 PROCEDURE — G8417 CALC BMI ABV UP PARAM F/U: HCPCS | Performed by: FAMILY MEDICINE

## 2023-11-09 PROCEDURE — G8427 DOCREV CUR MEDS BY ELIG CLIN: HCPCS | Performed by: FAMILY MEDICINE

## 2023-11-09 PROCEDURE — 99214 OFFICE O/P EST MOD 30 MIN: CPT | Performed by: FAMILY MEDICINE

## 2023-11-09 PROCEDURE — 3074F SYST BP LT 130 MM HG: CPT | Performed by: FAMILY MEDICINE

## 2023-11-09 PROCEDURE — 73562 X-RAY EXAM OF KNEE 3: CPT

## 2023-11-09 PROCEDURE — 1123F ACP DISCUSS/DSCN MKR DOCD: CPT | Performed by: FAMILY MEDICINE

## 2023-11-09 PROCEDURE — G8484 FLU IMMUNIZE NO ADMIN: HCPCS | Performed by: FAMILY MEDICINE

## 2023-11-09 PROCEDURE — 3017F COLORECTAL CA SCREEN DOC REV: CPT | Performed by: FAMILY MEDICINE

## 2023-11-09 PROCEDURE — 73630 X-RAY EXAM OF FOOT: CPT

## 2023-11-09 PROCEDURE — 1090F PRES/ABSN URINE INCON ASSESS: CPT | Performed by: FAMILY MEDICINE

## 2023-11-09 PROCEDURE — 1036F TOBACCO NON-USER: CPT | Performed by: FAMILY MEDICINE

## 2023-11-09 NOTE — PROGRESS NOTES
Patient states she does not need refills at this time
of the left knee. There is no deformity of the left knee. There is mild tenderness to palpation of the left knee medially. There is no deformity of the right foot. There is mild tenderness to palpation of the right foot laterally. Results of labs done 10/31/2023 were reviewed with the patient:   Hospital Outpatient Visit on 10/31/2023   Component Date Value Ref Range Status    Hemoglobin A1C 10/31/2023 6.0  4.0 - 6.0 % Final    Estimated Avg Glucose 10/31/2023 126  mg/dL Final    Comment: The ADA and AACC recommend providing the estimated average glucose result to permit better   patient understanding of their HBA1c result. Cholesterol 10/31/2023 149  <200 mg/dL Final    Comment:    Cholesterol Guidelines:      <200  Desirable   200-240  Borderline      >240  Undesirable         HDL 10/31/2023 47  >40 mg/dL Final    Comment:    HDL Guidelines:    <40     Undesirable   40-59    Borderline    >59     Desirable         LDL Cholesterol 10/31/2023 74  0 - 130 mg/dL Final    Comment:    LDL Guidelines:     <100    Desirable   100-129   Near to/above Desirable   130-159   Borderline      >159   Undesirable     Direct (measured) LDL and calculated LDL are not interchangeable tests. Chol/HDL Ratio 10/31/2023 3.2  <5 Final            Triglycerides 10/31/2023 141  <150 mg/dL Final    Comment:    Triglyceride Guidelines:     <150   Desirable   150-199  Borderline   200-499  High     >499   Very high   Based on AHA Guidelines for fasting triglyceride, October 2012.          Sodium 10/31/2023 141  135 - 144 mmol/L Final    Potassium 10/31/2023 4.1  3.7 - 5.3 mmol/L Final    Chloride 10/31/2023 105  98 - 107 mmol/L Final    CO2 10/31/2023 28  20 - 31 mmol/L Final    Anion Gap 10/31/2023 8 (L)  9 - 17 mmol/L Final    Glucose 10/31/2023 111 (H)  70 - 99 mg/dL Final    BUN 10/31/2023 16  8 - 23 mg/dL Final    Creatinine 10/31/2023 0.6  0.5 - 0.9 mg/dL Final    Est, Glom Filt Rate 10/31/2023 >60  >60 mL/min/1.73m2

## 2023-11-13 DIAGNOSIS — I63.9 CEREBROVASCULAR ACCIDENT (CVA), UNSPECIFIED MECHANISM (HCC): ICD-10-CM

## 2023-11-13 NOTE — TELEPHONE ENCOUNTER
Last Appt:  6/14/2023  Next Appt:   12/13/2023  Med verified in Epic     Patient needs refill on clopidogrel and ropinirole

## 2023-11-14 RX ORDER — CLOPIDOGREL BISULFATE 75 MG/1
TABLET ORAL
Qty: 90 TABLET | Refills: 1 | Status: SHIPPED | OUTPATIENT
Start: 2023-11-14

## 2023-12-06 DIAGNOSIS — E03.9 ACQUIRED HYPOTHYROIDISM: ICD-10-CM

## 2023-12-06 RX ORDER — LEVOTHYROXINE SODIUM 137 UG/1
137 TABLET ORAL DAILY
Qty: 90 TABLET | Refills: 1 | Status: SHIPPED | OUTPATIENT
Start: 2023-12-06

## 2023-12-06 NOTE — TELEPHONE ENCOUNTER
Shu Sanabria called requesting a refill of the below medication which has been pended for you:     Patient will be out of medication before her next appointment    Requested Prescriptions     Pending Prescriptions Disp Refills    levothyroxine (SYNTHROID) 137 MCG tablet 90 tablet 0     Sig: Take 1 tablet by mouth daily       Last Appointment Date: 11/9/2023  Next Appointment Date: 5/9/2024    Allergies   Allergen Reactions    Sulfamethoxazole-Trimethoprim Swelling     lips    Penicillins Rash

## 2023-12-13 ENCOUNTER — OFFICE VISIT (OUTPATIENT)
Dept: NEUROLOGY | Age: 73
End: 2023-12-13
Payer: MEDICARE

## 2023-12-13 VITALS
BODY MASS INDEX: 35.33 KG/M2 | SYSTOLIC BLOOD PRESSURE: 118 MMHG | WEIGHT: 187 LBS | OXYGEN SATURATION: 97 % | HEART RATE: 64 BPM | DIASTOLIC BLOOD PRESSURE: 70 MMHG

## 2023-12-13 DIAGNOSIS — R42 DIZZINESS: ICD-10-CM

## 2023-12-13 DIAGNOSIS — I69.90 LATE EFFECTS OF CVA (CEREBROVASCULAR ACCIDENT): ICD-10-CM

## 2023-12-13 DIAGNOSIS — E03.9 ACQUIRED HYPOTHYROIDISM: ICD-10-CM

## 2023-12-13 DIAGNOSIS — G25.81 RLS (RESTLESS LEGS SYNDROME): ICD-10-CM

## 2023-12-13 DIAGNOSIS — Z86.73 H/O TIA (TRANSIENT ISCHEMIC ATTACK) AND STROKE: Primary | ICD-10-CM

## 2023-12-13 DIAGNOSIS — I63.00 CEREBRAL INFARCTION DUE TO THROMBOSIS OF PRECEREBRAL ARTERY (HCC): ICD-10-CM

## 2023-12-13 DIAGNOSIS — G45.0 VBI (VERTEBROBASILAR INSUFFICIENCY): ICD-10-CM

## 2023-12-13 DIAGNOSIS — I63.9 CEREBROVASCULAR ACCIDENT (CVA), UNSPECIFIED MECHANISM (HCC): ICD-10-CM

## 2023-12-13 DIAGNOSIS — G57.12 MERALGIA PARESTHETICA OF LEFT SIDE: ICD-10-CM

## 2023-12-13 DIAGNOSIS — I63.81 LACUNAR INFARCTION (HCC): ICD-10-CM

## 2023-12-13 PROCEDURE — 1090F PRES/ABSN URINE INCON ASSESS: CPT | Performed by: PSYCHIATRY & NEUROLOGY

## 2023-12-13 PROCEDURE — G8399 PT W/DXA RESULTS DOCUMENT: HCPCS | Performed by: PSYCHIATRY & NEUROLOGY

## 2023-12-13 PROCEDURE — G8417 CALC BMI ABV UP PARAM F/U: HCPCS | Performed by: PSYCHIATRY & NEUROLOGY

## 2023-12-13 PROCEDURE — 1036F TOBACCO NON-USER: CPT | Performed by: PSYCHIATRY & NEUROLOGY

## 2023-12-13 PROCEDURE — G8427 DOCREV CUR MEDS BY ELIG CLIN: HCPCS | Performed by: PSYCHIATRY & NEUROLOGY

## 2023-12-13 PROCEDURE — G8484 FLU IMMUNIZE NO ADMIN: HCPCS | Performed by: PSYCHIATRY & NEUROLOGY

## 2023-12-13 PROCEDURE — 99215 OFFICE O/P EST HI 40 MIN: CPT | Performed by: PSYCHIATRY & NEUROLOGY

## 2023-12-13 PROCEDURE — 3017F COLORECTAL CA SCREEN DOC REV: CPT | Performed by: PSYCHIATRY & NEUROLOGY

## 2023-12-13 PROCEDURE — 99213 OFFICE O/P EST LOW 20 MIN: CPT | Performed by: PSYCHIATRY & NEUROLOGY

## 2023-12-13 PROCEDURE — 1123F ACP DISCUSS/DSCN MKR DOCD: CPT | Performed by: PSYCHIATRY & NEUROLOGY

## 2023-12-13 RX ORDER — CLOPIDOGREL BISULFATE 75 MG/1
TABLET ORAL
Qty: 90 TABLET | Refills: 1 | Status: SHIPPED | OUTPATIENT
Start: 2023-12-13

## 2023-12-13 RX ORDER — ROPINIROLE 1 MG/1
TABLET, FILM COATED ORAL
Qty: 90 TABLET | Refills: 1 | Status: SHIPPED | OUTPATIENT
Start: 2023-12-13

## 2023-12-13 ASSESSMENT — ENCOUNTER SYMPTOMS
EYE ITCHING: 0
SORE THROAT: 0
DIARRHEA: 0
CONSTIPATION: 0
APNEA: 0
VOMITING: 0
SINUS PRESSURE: 0
BACK PAIN: 0
EYE PAIN: 0
CHEST TIGHTNESS: 0
SWOLLEN GLANDS: 0
EYE REDNESS: 0
VISUAL CHANGE: 0
WHEEZING: 0
ABDOMINAL DISTENTION: 0
VOICE CHANGE: 0
TROUBLE SWALLOWING: 0
SHORTNESS OF BREATH: 0
FACIAL SWELLING: 0
BLOOD IN STOOL: 0
EYE DISCHARGE: 0
ABDOMINAL PAIN: 0
CHOKING: 0
PHOTOPHOBIA: 0
BOWEL INCONTINENCE: 0
COLOR CHANGE: 0
NAUSEA: 0
CHANGE IN BOWEL HABIT: 0
COUGH: 0

## 2023-12-14 ENCOUNTER — HOSPITAL ENCOUNTER (OUTPATIENT)
Dept: MRI IMAGING | Age: 73
Discharge: HOME | End: 2023-12-16
Attending: PSYCHIATRY & NEUROLOGY
Payer: MEDICARE

## 2023-12-14 DIAGNOSIS — G25.81 RLS (RESTLESS LEGS SYNDROME): ICD-10-CM

## 2023-12-14 PROCEDURE — 70551 MRI BRAIN STEM W/O DYE: CPT

## 2024-01-09 ENCOUNTER — OFFICE VISIT (OUTPATIENT)
Dept: CARDIOLOGY | Age: 74
End: 2024-01-09
Payer: MEDICARE

## 2024-01-09 VITALS
DIASTOLIC BLOOD PRESSURE: 70 MMHG | BODY MASS INDEX: 35.68 KG/M2 | WEIGHT: 189 LBS | SYSTOLIC BLOOD PRESSURE: 116 MMHG | HEIGHT: 61 IN | OXYGEN SATURATION: 99 % | HEART RATE: 67 BPM

## 2024-01-09 DIAGNOSIS — G45.9 TIA (TRANSIENT ISCHEMIC ATTACK): Primary | ICD-10-CM

## 2024-01-09 DIAGNOSIS — I63.89 CEREBROVASCULAR ACCIDENT (CVA) DUE TO OTHER MECHANISM (HCC): ICD-10-CM

## 2024-01-09 DIAGNOSIS — E78.2 MIXED HYPERLIPIDEMIA: ICD-10-CM

## 2024-01-09 DIAGNOSIS — Z98.890 S/P CARDIAC CATH: ICD-10-CM

## 2024-01-09 DIAGNOSIS — I10 HYPERTENSION, ESSENTIAL: ICD-10-CM

## 2024-01-09 DIAGNOSIS — Z86.73 H/O TIA (TRANSIENT ISCHEMIC ATTACK) AND STROKE: ICD-10-CM

## 2024-01-09 DIAGNOSIS — F41.9 ANXIETY: ICD-10-CM

## 2024-01-09 DIAGNOSIS — E78.00 PURE HYPERCHOLESTEROLEMIA: ICD-10-CM

## 2024-01-09 DIAGNOSIS — E11.9 CONTROLLED TYPE 2 DIABETES MELLITUS WITHOUT COMPLICATION, UNSPECIFIED WHETHER LONG TERM INSULIN USE (HCC): ICD-10-CM

## 2024-01-09 DIAGNOSIS — R41.3 MEMORY PROBLEM: ICD-10-CM

## 2024-01-09 PROCEDURE — 1090F PRES/ABSN URINE INCON ASSESS: CPT | Performed by: INTERNAL MEDICINE

## 2024-01-09 PROCEDURE — 2022F DILAT RTA XM EVC RTNOPTHY: CPT | Performed by: INTERNAL MEDICINE

## 2024-01-09 PROCEDURE — 3074F SYST BP LT 130 MM HG: CPT | Performed by: INTERNAL MEDICINE

## 2024-01-09 PROCEDURE — 93005 ELECTROCARDIOGRAM TRACING: CPT | Performed by: INTERNAL MEDICINE

## 2024-01-09 PROCEDURE — 99214 OFFICE O/P EST MOD 30 MIN: CPT | Performed by: INTERNAL MEDICINE

## 2024-01-09 PROCEDURE — G8427 DOCREV CUR MEDS BY ELIG CLIN: HCPCS | Performed by: INTERNAL MEDICINE

## 2024-01-09 PROCEDURE — 3078F DIAST BP <80 MM HG: CPT | Performed by: INTERNAL MEDICINE

## 2024-01-09 PROCEDURE — G8417 CALC BMI ABV UP PARAM F/U: HCPCS | Performed by: INTERNAL MEDICINE

## 2024-01-09 PROCEDURE — 99212 OFFICE O/P EST SF 10 MIN: CPT | Performed by: INTERNAL MEDICINE

## 2024-01-09 PROCEDURE — 3046F HEMOGLOBIN A1C LEVEL >9.0%: CPT | Performed by: INTERNAL MEDICINE

## 2024-01-09 PROCEDURE — G8399 PT W/DXA RESULTS DOCUMENT: HCPCS | Performed by: INTERNAL MEDICINE

## 2024-01-09 PROCEDURE — 1123F ACP DISCUSS/DSCN MKR DOCD: CPT | Performed by: INTERNAL MEDICINE

## 2024-01-09 PROCEDURE — 3017F COLORECTAL CA SCREEN DOC REV: CPT | Performed by: INTERNAL MEDICINE

## 2024-01-09 PROCEDURE — 1036F TOBACCO NON-USER: CPT | Performed by: INTERNAL MEDICINE

## 2024-01-09 PROCEDURE — 93010 ELECTROCARDIOGRAM REPORT: CPT | Performed by: INTERNAL MEDICINE

## 2024-01-09 PROCEDURE — G8484 FLU IMMUNIZE NO ADMIN: HCPCS | Performed by: INTERNAL MEDICINE

## 2024-01-09 NOTE — PROGRESS NOTES
Today's Date: 1/9/2024  Patient's Name: Ольга Lewis  Patient's age: 73 y.o., 1950    CC: HTN, DL    HPI:  The patient is a 73 y.o. year old, , female is in the office for F/U.  No chest pains, no sob, no palpitations, no le edema, no orthopnea, no PND.   Does lots of walking everyday walks to [employer].   Usually 30 minutes a day.   Does aerobics 3x/week.     Past Medical History:   has a past medical history of Acute confusion, CVA (cerebral infarction), Hyperlipidemia, Hypothyroidism, Lacunar infarction (HCC), Memory loss due to medical condition, Meralgia paresthetica, Myopia with astigmatism and presbyopia, Perennial allergic rhinitis, RLS (restless legs syndrome), Status post biopsy of skin, TIA (transient ischemic attack), and VBI (vertebrobasilar insufficiency).    Past Surgical History:   has a past surgical history that includes pre-malignant / benign skin lesion excision; [place] tooth extraction (1981); Cardiac catheterization (9/2014); Colonoscopy (02/25/2015); Insertable Cardiac Monitor (4/22/16); Intracapsular cataract extraction (Right, 9/29/2020); Intracapsular cataract extraction (Left, 10/27/2020); and Cataract removal (Bilateral, 2020).    Home Medications:  Prior to Admission medications    Medication Sig Start Date End Date Taking? Authorizing Provider   metoprolol tartrate (LOPRESSOR) 25 MG tablet take 1/2 tablet by mouth twice a day 12/18/23  Yes Earlene Vergara, APRN - NP   rOPINIRole (REQUIP) 1 MG tablet take 1 tablet by mouth at bedtime 12/13/23  Yes Peter Kahn MD   clopidogrel (PLAVIX) 75 MG tablet take 1 tablet by mouth once daily 12/13/23  Yes Peter Kahn MD   levothyroxine (SYNTHROID) 137 MCG tablet Take 1 tablet by mouth daily 12/6/23  Yes Shakira Patel MD   Lactobacillus (PROBIOTIC ACIDOPHILUS PO) Take by mouth   Yes ProviderKelly MD   atorvastatin (LIPITOR) 10 MG tablet take 1 tablet by mouth once daily 8/22/23  Yes Victor Hugo Muniz

## 2024-01-12 ENCOUNTER — HOSPITAL ENCOUNTER (OUTPATIENT)
Dept: NEUROLOGY | Age: 74
Discharge: HOME | End: 2024-01-12
Payer: MEDICARE

## 2024-01-12 DIAGNOSIS — G25.81 RLS (RESTLESS LEGS SYNDROME): ICD-10-CM

## 2024-01-12 PROCEDURE — 95813 EEG EXTND MNTR 61-119 MIN: CPT

## 2024-01-12 NOTE — PROCEDURES
significant  abnormality.    IMPRESSION:  No significant focal, lateralized or epileptiform features  noted during the current recording.    Further clinical correlation and followup recommended.          BRENT ALVES MD          D: 01/12/2024 16:27:57       T: 01/12/2024 17:04:17     [email or web address]  Job#: [id number]     Doc#: [id number]    CC:

## 2024-01-31 ENCOUNTER — OFFICE VISIT (OUTPATIENT)
Dept: NEUROLOGY | Age: 74
End: 2024-01-31
Payer: MEDICARE

## 2024-01-31 VITALS
BODY MASS INDEX: 35.72 KG/M2 | DIASTOLIC BLOOD PRESSURE: 78 MMHG | HEART RATE: 66 BPM | HEIGHT: 61 IN | OXYGEN SATURATION: 98 % | SYSTOLIC BLOOD PRESSURE: 110 MMHG | WEIGHT: 189.2 LBS

## 2024-01-31 DIAGNOSIS — R42 DIZZINESS: ICD-10-CM

## 2024-01-31 DIAGNOSIS — I63.81 LACUNAR INFARCTION (HCC): ICD-10-CM

## 2024-01-31 DIAGNOSIS — R41.3 MEMORY PROBLEM: ICD-10-CM

## 2024-01-31 DIAGNOSIS — I69.90 LATE EFFECTS OF CVA (CEREBROVASCULAR ACCIDENT): ICD-10-CM

## 2024-01-31 DIAGNOSIS — E66.01 SEVERE OBESITY (BMI 35.0-39.9) WITH COMORBIDITY (HCC): ICD-10-CM

## 2024-01-31 DIAGNOSIS — I63.9 CEREBROVASCULAR ACCIDENT (CVA), UNSPECIFIED MECHANISM (HCC): ICD-10-CM

## 2024-01-31 DIAGNOSIS — G57.10 MERALGIA PARESTHETICA, UNSPECIFIED LATERALITY: ICD-10-CM

## 2024-01-31 DIAGNOSIS — E03.9 ACQUIRED HYPOTHYROIDISM: ICD-10-CM

## 2024-01-31 DIAGNOSIS — G45.0 VBI (VERTEBROBASILAR INSUFFICIENCY): ICD-10-CM

## 2024-01-31 DIAGNOSIS — G25.81 RLS (RESTLESS LEGS SYNDROME): ICD-10-CM

## 2024-01-31 DIAGNOSIS — G57.12 MERALGIA PARESTHETICA OF LEFT SIDE: ICD-10-CM

## 2024-01-31 DIAGNOSIS — Z86.73 H/O TIA (TRANSIENT ISCHEMIC ATTACK) AND STROKE: Primary | ICD-10-CM

## 2024-01-31 PROCEDURE — 1090F PRES/ABSN URINE INCON ASSESS: CPT | Performed by: PSYCHIATRY & NEUROLOGY

## 2024-01-31 PROCEDURE — 99214 OFFICE O/P EST MOD 30 MIN: CPT | Performed by: PSYCHIATRY & NEUROLOGY

## 2024-01-31 PROCEDURE — 1036F TOBACCO NON-USER: CPT | Performed by: PSYCHIATRY & NEUROLOGY

## 2024-01-31 PROCEDURE — G8427 DOCREV CUR MEDS BY ELIG CLIN: HCPCS | Performed by: PSYCHIATRY & NEUROLOGY

## 2024-01-31 PROCEDURE — G8484 FLU IMMUNIZE NO ADMIN: HCPCS | Performed by: PSYCHIATRY & NEUROLOGY

## 2024-01-31 PROCEDURE — G8399 PT W/DXA RESULTS DOCUMENT: HCPCS | Performed by: PSYCHIATRY & NEUROLOGY

## 2024-01-31 PROCEDURE — G8417 CALC BMI ABV UP PARAM F/U: HCPCS | Performed by: PSYCHIATRY & NEUROLOGY

## 2024-01-31 PROCEDURE — 1123F ACP DISCUSS/DSCN MKR DOCD: CPT | Performed by: PSYCHIATRY & NEUROLOGY

## 2024-01-31 PROCEDURE — 3017F COLORECTAL CA SCREEN DOC REV: CPT | Performed by: PSYCHIATRY & NEUROLOGY

## 2024-01-31 RX ORDER — MULTIVIT WITH MINERALS/LUTEIN
1000 TABLET ORAL DAILY
COMMUNITY

## 2024-01-31 ASSESSMENT — ENCOUNTER SYMPTOMS
TROUBLE SWALLOWING: 0
VOMITING: 0
SHORTNESS OF BREATH: 0
COUGH: 0
EYE ITCHING: 0
EYE REDNESS: 0
VOICE CHANGE: 0
BACK PAIN: 0
VISUAL CHANGE: 0
PHOTOPHOBIA: 0
ABDOMINAL DISTENTION: 0
EYE PAIN: 0
WHEEZING: 0
NAUSEA: 0
BLOOD IN STOOL: 0
CHEST TIGHTNESS: 0
BOWEL INCONTINENCE: 0
COLOR CHANGE: 0
CHOKING: 0
ABDOMINAL PAIN: 0
FACIAL SWELLING: 0
SWOLLEN GLANDS: 0
SORE THROAT: 0
DIARRHEA: 0
SINUS PRESSURE: 0
EYE DISCHARGE: 0
CONSTIPATION: 0
APNEA: 0
CHANGE IN BOWEL HABIT: 0

## 2024-01-31 NOTE — PROGRESS NOTES
Behavior: Behavior is not agitated, slowed, aggressive, withdrawn, hyperactive or combative. Behavior is cooperative.         Thought Content: Thought content is not paranoid or delusional. Thought content does not include homicidal or suicidal ideation. Thought content does not include homicidal or suicidal plan.         Cognition and Memory: Cognition is impaired. Memory is impaired. She does not exhibit impaired recent memory or impaired remote memory.         Judgment: Judgment is not impulsive or inappropriate.         NEUROLOGICAL EXAMINATION :    A) MENTAL STATUS:                   Alert and  oriented  To time, place  And  Person.                  No Aphasia.  No  Dysarthria.                    Able   To  Follow     SIMPLE   commands without   Any  Difficulty.                 No right  To left confusion.                  Normal  Speech  And language function.                   Insight and  Judgment ,Fund  Of  Knowledge   within normal limits                Recent  And  Remote memory   DECREASED                  Attention &Concentration are    DECREASED                                                 B) CRANIAL NERVES :             2 CN : Visual  Acuity  And  Visual fields  within normal limits                        Fundi  Could  Not  Be  Could  Not  Be  Evaluated.           3,4,6 CN : Both  Pupils are   Reactive and  Equal.                            Extraocular   Movements  Are  Intact.                             No  Nystagmus.  No  TESSA.  No  Afferent  Pupillary  Defect noted.          5 CN :  Normal  Facial sensations and Corneal  Reflexes           7 CN :  Normal  Facial  Symmetry  And  Strength.  No facial  Weakness.           8 CN :  Hearing  Appears subnormal          9, 10 CN: Normal spontaneous, reflex palate movements         11 CN:   Normal  Shoulder shrug and  Strength         12 CN :   Normal  Tongue movements and  Tongue  In midline                        No tongue   Fasciculations or

## 2024-03-18 ENCOUNTER — OFFICE VISIT (OUTPATIENT)
Dept: PRIMARY CARE CLINIC | Age: 74
End: 2024-03-18
Payer: MEDICARE

## 2024-03-18 VITALS
HEIGHT: 61 IN | TEMPERATURE: 97.2 F | OXYGEN SATURATION: 96 % | BODY MASS INDEX: 35.5 KG/M2 | DIASTOLIC BLOOD PRESSURE: 68 MMHG | HEART RATE: 81 BPM | WEIGHT: 188 LBS | SYSTOLIC BLOOD PRESSURE: 136 MMHG

## 2024-03-18 DIAGNOSIS — R05.1 ACUTE COUGH: ICD-10-CM

## 2024-03-18 DIAGNOSIS — U07.1 COVID-19: Primary | ICD-10-CM

## 2024-03-18 LAB
INFLUENZA A ANTIGEN, POC: NEGATIVE
INFLUENZA B ANTIGEN, POC: NEGATIVE
LOT EXPIRE DATE: ABNORMAL
LOT KIT NUMBER: ABNORMAL
SARS-COV-2, POC: DETECTED
VALID INTERNAL CONTROL: ABNORMAL
VENDOR AND KIT NAME POC: ABNORMAL

## 2024-03-18 PROCEDURE — G8399 PT W/DXA RESULTS DOCUMENT: HCPCS | Performed by: NURSE PRACTITIONER

## 2024-03-18 PROCEDURE — 99213 OFFICE O/P EST LOW 20 MIN: CPT | Performed by: NURSE PRACTITIONER

## 2024-03-18 PROCEDURE — 3017F COLORECTAL CA SCREEN DOC REV: CPT | Performed by: NURSE PRACTITIONER

## 2024-03-18 PROCEDURE — G8427 DOCREV CUR MEDS BY ELIG CLIN: HCPCS | Performed by: NURSE PRACTITIONER

## 2024-03-18 PROCEDURE — G8417 CALC BMI ABV UP PARAM F/U: HCPCS | Performed by: NURSE PRACTITIONER

## 2024-03-18 PROCEDURE — 99212 OFFICE O/P EST SF 10 MIN: CPT | Performed by: NURSE PRACTITIONER

## 2024-03-18 PROCEDURE — 1036F TOBACCO NON-USER: CPT | Performed by: NURSE PRACTITIONER

## 2024-03-18 PROCEDURE — 1123F ACP DISCUSS/DSCN MKR DOCD: CPT | Performed by: NURSE PRACTITIONER

## 2024-03-18 PROCEDURE — 87428 SARSCOV & INF VIR A&B AG IA: CPT | Performed by: NURSE PRACTITIONER

## 2024-03-18 PROCEDURE — G8484 FLU IMMUNIZE NO ADMIN: HCPCS | Performed by: NURSE PRACTITIONER

## 2024-03-18 PROCEDURE — PBSHW POCT COVID-19 & INFLUENZA A/B: Performed by: NURSE PRACTITIONER

## 2024-03-18 PROCEDURE — 1090F PRES/ABSN URINE INCON ASSESS: CPT | Performed by: NURSE PRACTITIONER

## 2024-03-18 RX ORDER — LORATADINE 10 MG/1
10 TABLET ORAL DAILY
Qty: 30 TABLET | Refills: 0 | Status: SHIPPED | OUTPATIENT
Start: 2024-03-18

## 2024-03-18 RX ORDER — FLUTICASONE PROPIONATE 50 MCG
SPRAY, SUSPENSION (ML) NASAL
Qty: 1 EACH | Refills: 0 | Status: SHIPPED | OUTPATIENT
Start: 2024-03-18

## 2024-03-18 ASSESSMENT — PATIENT HEALTH QUESTIONNAIRE - PHQ9
SUM OF ALL RESPONSES TO PHQ QUESTIONS 1-9: 0
2. FEELING DOWN, DEPRESSED OR HOPELESS: NOT AT ALL
SUM OF ALL RESPONSES TO PHQ9 QUESTIONS 1 & 2: 0
SUM OF ALL RESPONSES TO PHQ QUESTIONS 1-9: 0
SUM OF ALL RESPONSES TO PHQ QUESTIONS 1-9: 0
1. LITTLE INTEREST OR PLEASURE IN DOING THINGS: NOT AT ALL
SUM OF ALL RESPONSES TO PHQ QUESTIONS 1-9: 0

## 2024-03-18 ASSESSMENT — ENCOUNTER SYMPTOMS
NAUSEA: 0
CHEST TIGHTNESS: 0
RHINORRHEA: 1
WHEEZING: 0
VOMITING: 0
SINUS COMPLAINT: 1
SHORTNESS OF BREATH: 0
CONSTIPATION: 0
SORE THROAT: 0
ABDOMINAL PAIN: 0
SINUS PAIN: 1
COLOR CHANGE: 0
ABDOMINAL DISTENTION: 0
DIARRHEA: 0
COUGH: 1
SINUS PRESSURE: 1

## 2024-03-18 NOTE — PROGRESS NOTES
Meningococcal (ACWY) vaccine  Aged Out    Breast cancer screen  Discontinued       Subjective:     Review of Systems   Constitutional:  Positive for fatigue. Negative for activity change, appetite change, chills, diaphoresis and fever.        Body Aches   HENT:  Positive for congestion, ear pain (right ear feels \"funny\"), rhinorrhea (yellow), sinus pressure and sinus pain. Negative for ear discharge, postnasal drip and sore throat.    Respiratory:  Positive for cough. Negative for chest tightness, shortness of breath and wheezing.    Cardiovascular:  Negative for chest pain, palpitations and leg swelling.   Gastrointestinal:  Negative for abdominal distention, abdominal pain, constipation, diarrhea, nausea and vomiting.   Skin:  Negative for color change and rash.   Neurological:  Positive for headaches. Negative for dizziness, weakness, light-headedness and numbness.       Objective:     Physical Exam  Vitals and nursing note reviewed.   Constitutional:       General: She is awake.      Appearance: Normal appearance. She is well-developed and well-groomed. She is not ill-appearing or toxic-appearing.   HENT:      Head: Normocephalic.      Right Ear: Tympanic membrane, ear canal and external ear normal.      Left Ear: Tympanic membrane, ear canal and external ear normal.      Ears:      Comments: Clear fluid behind bilateral TMs     Nose: Congestion present.      Right Sinus: Maxillary sinus tenderness present. No frontal sinus tenderness.      Left Sinus: Maxillary sinus tenderness present. No frontal sinus tenderness.      Mouth/Throat:      Lips: Pink.      Mouth: Mucous membranes are moist.      Pharynx: Oropharynx is clear.      Comments: Clear post nasal drainage  Cardiovascular:      Rate and Rhythm: Normal rate and regular rhythm.      Heart sounds: Normal heart sounds, S1 normal and S2 normal.   Pulmonary:      Effort: Pulmonary effort is normal.      Breath sounds: Normal breath sounds and air entry.

## 2024-05-01 ENCOUNTER — OFFICE VISIT (OUTPATIENT)
Dept: FAMILY MEDICINE CLINIC | Age: 74
End: 2024-05-01
Payer: MEDICARE

## 2024-05-01 ENCOUNTER — HOSPITAL ENCOUNTER (OUTPATIENT)
Age: 74
Discharge: HOME | End: 2024-05-01
Payer: MEDICARE

## 2024-05-01 VITALS
WEIGHT: 188 LBS | DIASTOLIC BLOOD PRESSURE: 72 MMHG | BODY MASS INDEX: 35.5 KG/M2 | HEART RATE: 80 BPM | OXYGEN SATURATION: 98 % | HEIGHT: 61 IN | SYSTOLIC BLOOD PRESSURE: 116 MMHG

## 2024-05-01 DIAGNOSIS — E78.2 MIXED HYPERLIPIDEMIA: ICD-10-CM

## 2024-05-01 DIAGNOSIS — R73.01 ELEVATED FASTING GLUCOSE: ICD-10-CM

## 2024-05-01 DIAGNOSIS — I10 ESSENTIAL HYPERTENSION: Primary | ICD-10-CM

## 2024-05-01 DIAGNOSIS — E03.9 ACQUIRED HYPOTHYROIDISM: ICD-10-CM

## 2024-05-01 LAB
ALBUMIN SERPL-MCNC: 3.9 G/DL (ref 3.5–5.2)
ALBUMIN/GLOB SERPL: 1.3 {RATIO} (ref 1–2.5)
ALP SERPL-CCNC: 103 U/L (ref 35–104)
ALT SERPL-CCNC: 19 U/L (ref 5–33)
ANION GAP SERPL CALCULATED.3IONS-SCNC: 12 MMOL/L (ref 9–17)
AST SERPL-CCNC: 19 U/L
BILIRUB SERPL-MCNC: 1.6 MG/DL (ref 0.3–1.2)
BUN SERPL-MCNC: 18 MG/DL (ref 8–23)
BUN/CREAT SERPL: 26 (ref 9–20)
CALCIUM SERPL-MCNC: 9.5 MG/DL (ref 8.6–10.4)
CHLORIDE SERPL-SCNC: 103 MMOL/L (ref 98–107)
CHOLEST SERPL-MCNC: 161 MG/DL (ref 0–199)
CHOLESTEROL/HDL RATIO: 4
CO2 SERPL-SCNC: 27 MMOL/L (ref 20–31)
CREAT SERPL-MCNC: 0.7 MG/DL (ref 0.5–0.9)
EST. AVERAGE GLUCOSE BLD GHB EST-MCNC: 128 MG/DL
GFR, ESTIMATED: >90 ML/MIN/1.73M2
GLUCOSE SERPL-MCNC: 112 MG/DL (ref 70–99)
HBA1C MFR BLD: 6.1 % (ref 4–6)
HDLC SERPL-MCNC: 46 MG/DL
LDLC SERPL CALC-MCNC: 86 MG/DL (ref 0–100)
POTASSIUM SERPL-SCNC: 4.1 MMOL/L (ref 3.7–5.3)
PROT SERPL-MCNC: 7 G/DL (ref 6.4–8.3)
SODIUM SERPL-SCNC: 142 MMOL/L (ref 135–144)
TRIGL SERPL-MCNC: 145 MG/DL
VLDLC SERPL CALC-MCNC: 29 MG/DL

## 2024-05-01 PROCEDURE — G8399 PT W/DXA RESULTS DOCUMENT: HCPCS | Performed by: FAMILY MEDICINE

## 2024-05-01 PROCEDURE — 3074F SYST BP LT 130 MM HG: CPT | Performed by: FAMILY MEDICINE

## 2024-05-01 PROCEDURE — 80053 COMPREHEN METABOLIC PANEL: CPT

## 2024-05-01 PROCEDURE — 1036F TOBACCO NON-USER: CPT | Performed by: FAMILY MEDICINE

## 2024-05-01 PROCEDURE — 3078F DIAST BP <80 MM HG: CPT | Performed by: FAMILY MEDICINE

## 2024-05-01 PROCEDURE — 83036 HEMOGLOBIN GLYCOSYLATED A1C: CPT

## 2024-05-01 PROCEDURE — 36415 COLL VENOUS BLD VENIPUNCTURE: CPT

## 2024-05-01 PROCEDURE — G8427 DOCREV CUR MEDS BY ELIG CLIN: HCPCS | Performed by: FAMILY MEDICINE

## 2024-05-01 PROCEDURE — 3017F COLORECTAL CA SCREEN DOC REV: CPT | Performed by: FAMILY MEDICINE

## 2024-05-01 PROCEDURE — 80061 LIPID PANEL: CPT

## 2024-05-01 PROCEDURE — 1090F PRES/ABSN URINE INCON ASSESS: CPT | Performed by: FAMILY MEDICINE

## 2024-05-01 PROCEDURE — 1123F ACP DISCUSS/DSCN MKR DOCD: CPT | Performed by: FAMILY MEDICINE

## 2024-05-01 PROCEDURE — 99214 OFFICE O/P EST MOD 30 MIN: CPT | Performed by: FAMILY MEDICINE

## 2024-05-01 PROCEDURE — G8417 CALC BMI ABV UP PARAM F/U: HCPCS | Performed by: FAMILY MEDICINE

## 2024-05-01 PROCEDURE — G2211 COMPLEX E/M VISIT ADD ON: HCPCS | Performed by: FAMILY MEDICINE

## 2024-05-01 RX ORDER — LEVOTHYROXINE SODIUM 137 UG/1
137 TABLET ORAL DAILY
Qty: 90 TABLET | Refills: 1 | Status: SHIPPED | OUTPATIENT
Start: 2024-05-01

## 2024-05-01 NOTE — PROGRESS NOTES
[place] [address]                        Telephone [phone]             Fax [phone]       Ольга Lewis  :  1950  Age:  73 y.o.   MRN:  [id number]  Date of visit:  2024       Assessment and Plan:    1. Essential hypertension  Her blood pressure is well-controlled today.  (BP: 116/72)   She was advised to continue current medications.  She states that she does not need a refill today.     2. Mixed hyperlipidemia  She had a lipid panel done earlier today.  The results are not available at the time of her office visit.  She will be contacted when the results are available.      She is tolerating Lipitor well.  She states that she does not need a refill today.     - Lipid Panel; Future was also ordered to be done prior to her return visit in 6 months.      3. Elevated fasting glucose  Her fasting glucose was elevated on the labs done earlier today.   The results of the HgbA1c done earlier today are not available at the time of her office visit.  She will be contacted when the results are available.     Labs were also ordered to be done prior to her return visit in 6 months:    - Comprehensive Metabolic Panel; Future  - Hemoglobin A1C; Future    4. Acquired hypothyroidism  TSH and free T4 were within normal limits on 2023  Lab Results   Component Value Date    TSH 1.05 2023    Free T4 1.6 2023      She was advised to continue with her current dose of Levothyroxine.  Levothyroxine was refilled:   - levothyroxine (SYNTHROID) 137 MCG tablet; Take 1 tablet by mouth daily  Dispense: 90 tablet; Refill: 1    Labs were ordered to be done prior to her return visit in 6 months:   - TSH; Future  - T4, Free; Future     5.  Routine health maintenance  Health maintenance was reviewed with the patient.   Covid vaccination was recommended.   RSV vaccination was recommended.        Follow up

## 2024-05-31 DIAGNOSIS — G25.81 RLS (RESTLESS LEGS SYNDROME): ICD-10-CM

## 2024-05-31 RX ORDER — ROPINIROLE 1 MG/1
TABLET, FILM COATED ORAL
Qty: 90 TABLET | Refills: 1 | Status: SHIPPED | OUTPATIENT
Start: 2024-05-31

## 2024-05-31 NOTE — TELEPHONE ENCOUNTER
Last Appt:  1/31/2024  Next Appt:   7/31/2024  Med verified in Epic     Patient needs refill on Ropinirole

## 2024-07-09 ENCOUNTER — OFFICE VISIT (OUTPATIENT)
Dept: CARDIOLOGY | Age: 74
End: 2024-07-09
Payer: MEDICARE

## 2024-07-09 VITALS
SYSTOLIC BLOOD PRESSURE: 118 MMHG | WEIGHT: 190.8 LBS | BODY MASS INDEX: 36.05 KG/M2 | DIASTOLIC BLOOD PRESSURE: 78 MMHG | HEART RATE: 70 BPM

## 2024-07-09 DIAGNOSIS — I25.10 CORONARY ARTERY DISEASE INVOLVING NATIVE CORONARY ARTERY OF NATIVE HEART WITHOUT ANGINA PECTORIS: ICD-10-CM

## 2024-07-09 DIAGNOSIS — I63.89 CEREBROVASCULAR ACCIDENT (CVA) DUE TO OTHER MECHANISM (HCC): Primary | ICD-10-CM

## 2024-07-09 DIAGNOSIS — I10 HYPERTENSION, ESSENTIAL: ICD-10-CM

## 2024-07-09 DIAGNOSIS — E78.00 PURE HYPERCHOLESTEROLEMIA: ICD-10-CM

## 2024-07-09 DIAGNOSIS — R94.31 ABNORMAL ECG: ICD-10-CM

## 2024-07-09 PROCEDURE — 3074F SYST BP LT 130 MM HG: CPT | Performed by: INTERNAL MEDICINE

## 2024-07-09 PROCEDURE — 93010 ELECTROCARDIOGRAM REPORT: CPT | Performed by: INTERNAL MEDICINE

## 2024-07-09 PROCEDURE — 1036F TOBACCO NON-USER: CPT | Performed by: INTERNAL MEDICINE

## 2024-07-09 PROCEDURE — 99212 OFFICE O/P EST SF 10 MIN: CPT | Performed by: INTERNAL MEDICINE

## 2024-07-09 PROCEDURE — 1090F PRES/ABSN URINE INCON ASSESS: CPT | Performed by: INTERNAL MEDICINE

## 2024-07-09 PROCEDURE — G8417 CALC BMI ABV UP PARAM F/U: HCPCS | Performed by: INTERNAL MEDICINE

## 2024-07-09 PROCEDURE — 3078F DIAST BP <80 MM HG: CPT | Performed by: INTERNAL MEDICINE

## 2024-07-09 PROCEDURE — G8427 DOCREV CUR MEDS BY ELIG CLIN: HCPCS | Performed by: INTERNAL MEDICINE

## 2024-07-09 PROCEDURE — 1123F ACP DISCUSS/DSCN MKR DOCD: CPT | Performed by: INTERNAL MEDICINE

## 2024-07-09 PROCEDURE — G8399 PT W/DXA RESULTS DOCUMENT: HCPCS | Performed by: INTERNAL MEDICINE

## 2024-07-09 PROCEDURE — 93005 ELECTROCARDIOGRAM TRACING: CPT | Performed by: INTERNAL MEDICINE

## 2024-07-09 PROCEDURE — 99214 OFFICE O/P EST MOD 30 MIN: CPT | Performed by: INTERNAL MEDICINE

## 2024-07-09 RX ORDER — ROSUVASTATIN CALCIUM 10 MG/1
10 TABLET, COATED ORAL DAILY
Qty: 90 TABLET | Refills: 3 | Status: SHIPPED | OUTPATIENT
Start: 2024-07-09 | End: 2024-07-09 | Stop reason: SDUPTHER

## 2024-07-09 RX ORDER — ROSUVASTATIN CALCIUM 10 MG/1
10 TABLET, COATED ORAL DAILY
Qty: 90 TABLET | Refills: 3 | Status: SHIPPED | OUTPATIENT
Start: 2024-07-09

## 2024-07-09 RX ORDER — EZETIMIBE 10 MG/1
10 TABLET ORAL DAILY
Qty: 90 TABLET | Refills: 3 | Status: SHIPPED | OUTPATIENT
Start: 2024-07-09 | End: 2024-07-09 | Stop reason: SDUPTHER

## 2024-07-09 RX ORDER — EZETIMIBE 10 MG/1
10 TABLET ORAL DAILY
Qty: 90 TABLET | Refills: 3 | Status: SHIPPED | OUTPATIENT
Start: 2024-07-09

## 2024-07-09 NOTE — PROGRESS NOTES
Today's Date: 7/9/2024  Patient's Name: Ольга Lewis  Patient's age: 74 y.o., 1950    CC: HTN, DL    HPI:  The patient is a 74 y.o. year old, , female is in the office for F/U.  No chest pains, no sob, no palpitations, no le edema, no orthopnea, no PND.   Does lots of walking everyday walks to [employer].   Usually 30 minutes a day.   Does aerobics 3x/week.     Past Medical History:   has a past medical history of Acute confusion, CVA (cerebral infarction), Hyperlipidemia, Hypothyroidism, Lacunar infarction (HCC), Memory loss due to medical condition, Meralgia paresthetica, Myopia with astigmatism and presbyopia, Perennial allergic rhinitis, RLS (restless legs syndrome), Status post biopsy of skin, TIA (transient ischemic attack), and VBI (vertebrobasilar insufficiency).    Past Surgical History:   has a past surgical history that includes pre-malignant / benign skin lesion excision; [place] tooth extraction (1981); Cardiac catheterization (9/2014); Colonoscopy (02/25/2015); Insertable Cardiac Monitor (4/22/16); Intracapsular cataract extraction (Right, 9/29/2020); Intracapsular cataract extraction (Left, 10/27/2020); and Cataract removal (Bilateral, 2020).    Home Medications:  Prior to Admission medications    Medication Sig Start Date End Date Taking? Authorizing Provider   rOPINIRole (REQUIP) 1 MG tablet take 1 tablet by mouth at bedtime 5/31/24  Yes Peter Kahn MD   levothyroxine (SYNTHROID) 137 MCG tablet Take 1 tablet by mouth daily 5/1/24  Yes Shakira Patel MD   fluticasone (FLONASE) 50 MCG/ACT nasal spray 2 sprays to each nostril once daily. 3/18/24  Yes Penny Calderon APRN - CNP   Ascorbic Acid (VITAMIN C) 1000 MG tablet Take 1 tablet by mouth daily   Yes Kelly Kaminski MD   metoprolol tartrate (LOPRESSOR) 25 MG tablet take 1/2 tablet by mouth twice a day 12/18/23  Yes Earlene Vergara APRN - NP   clopidogrel (PLAVIX) 75 MG tablet take 1 tablet by mouth once daily

## 2024-07-31 ENCOUNTER — OFFICE VISIT (OUTPATIENT)
Dept: NEUROLOGY | Age: 74
End: 2024-07-31
Payer: MEDICARE

## 2024-07-31 ENCOUNTER — HOSPITAL ENCOUNTER (OUTPATIENT)
Dept: CT IMAGING | Age: 74
Discharge: HOME | End: 2024-08-02
Attending: PSYCHIATRY & NEUROLOGY
Payer: MEDICARE

## 2024-07-31 VITALS
HEART RATE: 79 BPM | SYSTOLIC BLOOD PRESSURE: 120 MMHG | BODY MASS INDEX: 35.71 KG/M2 | WEIGHT: 189 LBS | DIASTOLIC BLOOD PRESSURE: 84 MMHG | OXYGEN SATURATION: 96 %

## 2024-07-31 DIAGNOSIS — I69.90 LATE EFFECTS OF CVA (CEREBROVASCULAR ACCIDENT): ICD-10-CM

## 2024-07-31 DIAGNOSIS — I63.9 CEREBROVASCULAR ACCIDENT (CVA), UNSPECIFIED MECHANISM (HCC): ICD-10-CM

## 2024-07-31 DIAGNOSIS — R42 DIZZINESS: Primary | ICD-10-CM

## 2024-07-31 DIAGNOSIS — I63.81 LACUNAR INFARCTION (HCC): ICD-10-CM

## 2024-07-31 DIAGNOSIS — G45.0 VBI (VERTEBROBASILAR INSUFFICIENCY): ICD-10-CM

## 2024-07-31 DIAGNOSIS — R41.3 MEMORY PROBLEM: ICD-10-CM

## 2024-07-31 DIAGNOSIS — Z86.73 H/O TIA (TRANSIENT ISCHEMIC ATTACK) AND STROKE: ICD-10-CM

## 2024-07-31 DIAGNOSIS — G25.81 RLS (RESTLESS LEGS SYNDROME): ICD-10-CM

## 2024-07-31 DIAGNOSIS — G93.89 BRAIN DYSFUNCTION: ICD-10-CM

## 2024-07-31 DIAGNOSIS — E03.9 ACQUIRED HYPOTHYROIDISM: ICD-10-CM

## 2024-07-31 DIAGNOSIS — G57.12 MERALGIA PARESTHETICA OF LEFT SIDE: ICD-10-CM

## 2024-07-31 DIAGNOSIS — R42 DIZZINESS: ICD-10-CM

## 2024-07-31 DIAGNOSIS — I79.8 OTHER DISORDERS OF ARTERIES, ARTERIOLES AND CAPILLARIES IN DISEASES CLASSIFIED ELSEWHERE (HCC): ICD-10-CM

## 2024-07-31 PROCEDURE — G8417 CALC BMI ABV UP PARAM F/U: HCPCS | Performed by: PSYCHIATRY & NEUROLOGY

## 2024-07-31 PROCEDURE — 99215 OFFICE O/P EST HI 40 MIN: CPT | Performed by: PSYCHIATRY & NEUROLOGY

## 2024-07-31 PROCEDURE — 70450 CT HEAD/BRAIN W/O DYE: CPT

## 2024-07-31 PROCEDURE — 1090F PRES/ABSN URINE INCON ASSESS: CPT | Performed by: PSYCHIATRY & NEUROLOGY

## 2024-07-31 PROCEDURE — G8427 DOCREV CUR MEDS BY ELIG CLIN: HCPCS | Performed by: PSYCHIATRY & NEUROLOGY

## 2024-07-31 PROCEDURE — 1123F ACP DISCUSS/DSCN MKR DOCD: CPT | Performed by: PSYCHIATRY & NEUROLOGY

## 2024-07-31 PROCEDURE — G8399 PT W/DXA RESULTS DOCUMENT: HCPCS | Performed by: PSYCHIATRY & NEUROLOGY

## 2024-07-31 PROCEDURE — 99214 OFFICE O/P EST MOD 30 MIN: CPT | Performed by: PSYCHIATRY & NEUROLOGY

## 2024-07-31 PROCEDURE — 1036F TOBACCO NON-USER: CPT | Performed by: PSYCHIATRY & NEUROLOGY

## 2024-07-31 RX ORDER — CLOPIDOGREL BISULFATE 75 MG/1
TABLET ORAL
Qty: 90 TABLET | Refills: 1 | Status: SHIPPED | OUTPATIENT
Start: 2024-07-31

## 2024-07-31 ASSESSMENT — ENCOUNTER SYMPTOMS
EYE REDNESS: 0
SORE THROAT: 0
EYE ITCHING: 0
COLOR CHANGE: 0
DIARRHEA: 0
WHEEZING: 0
SINUS PRESSURE: 0
EYE DISCHARGE: 0
SWOLLEN GLANDS: 0
COUGH: 0
ABDOMINAL PAIN: 0
BOWEL INCONTINENCE: 0
CONSTIPATION: 0
VOICE CHANGE: 0
FACIAL SWELLING: 0
TROUBLE SWALLOWING: 0
VISUAL CHANGE: 0
NAUSEA: 0
APNEA: 0
PHOTOPHOBIA: 0
ABDOMINAL DISTENTION: 0
VOMITING: 0
BACK PAIN: 0
CHANGE IN BOWEL HABIT: 0
SHORTNESS OF BREATH: 0
CHOKING: 0
BLOOD IN STOOL: 0
CHEST TIGHTNESS: 0
EYE PAIN: 0

## 2024-07-31 NOTE — PROGRESS NOTES
Subjective:        Patient ID: Ольга Lewis is a 74 y.o. RIGHT   HANDED female.            Cerebrovascular Accident  This is a chronic problem. Episode onset: TIA  IN  2008   AND  CVA  IN  2014. The problem occurs rarely. The problem has been resolved. Pertinent negatives include no abdominal pain, anorexia, arthralgias, change in bowel habit, chest pain, chills, congestion, coughing, diaphoresis, fatigue, fever, headaches, joint swelling, myalgias, nausea, neck pain, numbness, rash, sore throat, swollen glands, urinary symptoms, vertigo, visual change, vomiting or weakness. Nothing aggravates the symptoms. Treatments tried: ASA,  PLAVIX. The treatment provided significant relief.   Dizziness  This is a chronic problem. Episode onset: MORE  THAN   2 YEARS. The problem occurs intermittently. The problem has been resolved. Pertinent negatives include no abdominal pain, anorexia, arthralgias, change in bowel habit, chest pain, chills, congestion, coughing, diaphoresis, fatigue, fever, headaches, joint swelling, myalgias, nausea, neck pain, numbness, rash, sore throat, swollen glands, urinary symptoms, vertigo, visual change, vomiting or weakness. Nothing aggravates the symptoms. She has tried nothing for the symptoms. The treatment provided significant relief.   Neurologic Problem  The patient's pertinent negatives include no altered mental status, clumsiness, focal sensory loss, focal weakness, loss of balance, memory loss, near-syncope, slurred speech, syncope, visual change or weakness. Primary symptoms comment: MILD SHORT  TERM  MEMORY  PROBLEMS  . This is a chronic problem. The current episode started more than 1 year ago. The neurological problem developed insidiously. The problem is unchanged. There was no focality noted. Associated symptoms include dizziness. Pertinent negatives include no abdominal pain, auditory change, aura, back pain, bladder incontinence, bowel incontinence, chest pain, confusion,

## 2024-08-07 DIAGNOSIS — U07.1 COVID-19: ICD-10-CM

## 2024-08-07 DIAGNOSIS — J31.0 RHINITIS, UNSPECIFIED TYPE: Primary | ICD-10-CM

## 2024-08-07 RX ORDER — FLUTICASONE PROPIONATE 50 MCG
SPRAY, SUSPENSION (ML) NASAL
Qty: 16 G | Refills: 0 | Status: SHIPPED | OUTPATIENT
Start: 2024-08-07

## 2024-08-07 NOTE — TELEPHONE ENCOUNTER
Ольга called requesting a refill of the below medication which has been pended for you:     Requested Prescriptions     Pending Prescriptions Disp Refills    fluticasone (FLONASE) 50 MCG/ACT nasal spray [Pharmacy Med Name: FLUTICASONE PROP 50 MCG SPRAY] 16 g 0     Sig: instill 2 sprays into each nostril once daily       Last Appointment Date: 3/18/2024  Next Appointment Date: 11/12/24    Allergies   Allergen Reactions    Sulfamethoxazole-Trimethoprim Swelling     lips    Penicillins Rash

## 2024-08-08 RX ORDER — LORATADINE 10 MG/1
10 TABLET ORAL DAILY
Qty: 30 TABLET | Refills: 0 | OUTPATIENT
Start: 2024-08-08

## 2024-08-22 DIAGNOSIS — E03.9 ACQUIRED HYPOTHYROIDISM: ICD-10-CM

## 2024-08-22 RX ORDER — LEVOTHYROXINE SODIUM 137 UG/1
137 TABLET ORAL DAILY
Qty: 90 TABLET | Refills: 0 | Status: SHIPPED | OUTPATIENT
Start: 2024-08-22

## 2024-08-22 NOTE — TELEPHONE ENCOUNTER
Ольга called requesting a refill of the below medication which has been pended for you:     Requested Prescriptions     Pending Prescriptions Disp Refills    levothyroxine (SYNTHROID) 137 MCG tablet 90 tablet 0     Sig: Take 1 tablet by mouth daily       Last Appointment Date: 5/1/2024  Next Appointment Date: 11/12/2024    Allergies   Allergen Reactions    Sulfamethoxazole-Trimethoprim Swelling     lips    Penicillins Rash

## 2024-08-23 ENCOUNTER — HOSPITAL ENCOUNTER (OUTPATIENT)
Dept: GENERAL RADIOLOGY | Age: 74
End: 2024-08-23
Payer: MEDICARE

## 2024-08-23 ENCOUNTER — OFFICE VISIT (OUTPATIENT)
Dept: PRIMARY CARE CLINIC | Age: 74
End: 2024-08-23
Payer: MEDICARE

## 2024-08-23 VITALS
BODY MASS INDEX: 35.68 KG/M2 | TEMPERATURE: 97.7 F | RESPIRATION RATE: 18 BRPM | OXYGEN SATURATION: 100 % | SYSTOLIC BLOOD PRESSURE: 108 MMHG | HEART RATE: 84 BPM | HEIGHT: 61 IN | DIASTOLIC BLOOD PRESSURE: 74 MMHG | WEIGHT: 189 LBS

## 2024-08-23 DIAGNOSIS — R06.2 WHEEZING: ICD-10-CM

## 2024-08-23 DIAGNOSIS — R05.1 ACUTE COUGH: ICD-10-CM

## 2024-08-23 DIAGNOSIS — B96.89 BACTERIAL URI: Primary | ICD-10-CM

## 2024-08-23 DIAGNOSIS — J06.9 BACTERIAL URI: Primary | ICD-10-CM

## 2024-08-23 PROCEDURE — 71046 X-RAY EXAM CHEST 2 VIEWS: CPT

## 2024-08-23 PROCEDURE — 99214 OFFICE O/P EST MOD 30 MIN: CPT

## 2024-08-23 RX ORDER — PREDNISONE 20 MG/1
40 TABLET ORAL DAILY
Qty: 10 TABLET | Refills: 0 | Status: SHIPPED | OUTPATIENT
Start: 2024-08-23 | End: 2024-08-28

## 2024-08-23 RX ORDER — ALBUTEROL SULFATE 90 UG/1
2 AEROSOL, METERED RESPIRATORY (INHALATION) 4 TIMES DAILY PRN
Qty: 18 G | Refills: 0 | Status: SHIPPED | OUTPATIENT
Start: 2024-08-23

## 2024-08-23 RX ORDER — DOXYCYCLINE HYCLATE 100 MG
100 TABLET ORAL 2 TIMES DAILY
Qty: 14 TABLET | Refills: 0 | Status: SHIPPED | OUTPATIENT
Start: 2024-08-23 | End: 2024-08-30

## 2024-08-23 RX ORDER — BENZONATATE 100 MG/1
100 CAPSULE ORAL 3 TIMES DAILY PRN
Qty: 30 CAPSULE | Refills: 0 | Status: SHIPPED | OUTPATIENT
Start: 2024-08-23 | End: 2024-09-02

## 2024-08-23 ASSESSMENT — ENCOUNTER SYMPTOMS
SORE THROAT: 0
DIARRHEA: 0
NAUSEA: 0
RHINORRHEA: 1
SINUS PAIN: 1
SHORTNESS OF BREATH: 0
WHEEZING: 1
VOMITING: 0
ABDOMINAL PAIN: 0
COUGH: 1

## 2024-08-23 NOTE — PATIENT INSTRUCTIONS
Will call with results of xray  Take whole course of antibiotics  Steroid x 5 days  Discussed taking medication as prescribed in AM with food  Avoid NSAIDs while taking prescription steroid  Albuterol inhaler as needed for wheezing, shortness of breath  Benzonatate for cough  If symptoms worsen, SOB, difficulty swallowing, fever seek medical treatment  Patient verbalized understanding and agrees with plan of care

## 2024-08-23 NOTE — PROGRESS NOTES
[facility] Walk In department of [facility]  [address] [facility] [address]  Phone: [phone]  Fax: [phone]      Ольга Lewis  1950  MRN: [id number]  Date of visit: 8/23/2024    Chief Complaint:     Ольга Lewis is here for c/o of Cough (Having wheezing as well, along with nasal congestion and sore throat. Has been sick for over a week now. Symptoms are not getting better. )      HPI:     Ольга Lewis is a 74 y.o. female who presents to the [facility] today for her medical conditions/complaints as noted below.    URI   This is a new problem. Episode onset: 1 week. Associated symptoms include congestion, coughing, ear pain, a plugged ear sensation, rhinorrhea, sinus pain, sneezing and wheezing. Pertinent negatives include no abdominal pain, chest pain, diarrhea, headaches, nausea, sore throat or vomiting. Treatments tried: nyquil, benadryl, acetaminophen. The treatment provided mild relief.   No sick contacts    Past Medical History:   Diagnosis Date    Acute confusion     CVA (cerebral infarction)     Hyperlipidemia     Hypothyroidism     Lacunar infarction (HCC)     Memory loss due to medical condition     Meralgia paresthetica     left side    Myopia with astigmatism and presbyopia     Perennial allergic rhinitis     RLS (restless legs syndrome)     Status post biopsy of skin 09/2022    TIA (transient ischemic attack) 2008, 1/06/2014    with brief confusion    VBI (vertebrobasilar insufficiency)         Allergies   Allergen Reactions    Sulfamethoxazole-Trimethoprim Swelling     lips    Penicillins Rash         Subjective:      Review of Systems   HENT:  Positive for congestion, ear pain, rhinorrhea, sinus pain and sneezing. Negative for sore throat.    Respiratory:  Positive for cough and wheezing. Negative for shortness of breath.    Cardiovascular:  Negative for chest pain.   Gastrointestinal:  Negative for abdominal pain, diarrhea, nausea and

## 2024-08-26 ENCOUNTER — APPOINTMENT (OUTPATIENT)
Dept: INTERVENTIONAL RADIOLOGY/VASCULAR | Age: 74
End: 2024-08-26
Attending: PSYCHIATRY & NEUROLOGY
Payer: MEDICARE

## 2024-08-26 ENCOUNTER — HOSPITAL ENCOUNTER (OUTPATIENT)
Dept: INTERVENTIONAL RADIOLOGY/VASCULAR | Age: 74
Discharge: HOME | End: 2024-08-28
Attending: PSYCHIATRY & NEUROLOGY
Payer: MEDICARE

## 2024-08-26 ENCOUNTER — HOSPITAL ENCOUNTER (OUTPATIENT)
Dept: NEUROLOGY | Age: 74
Discharge: HOME | End: 2024-08-26
Attending: PSYCHIATRY & NEUROLOGY
Payer: MEDICARE

## 2024-08-26 DIAGNOSIS — R42 DIZZINESS: ICD-10-CM

## 2024-08-26 DIAGNOSIS — I63.9 CEREBROVASCULAR ACCIDENT (CVA), UNSPECIFIED MECHANISM (HCC): ICD-10-CM

## 2024-08-26 DIAGNOSIS — I79.8 OTHER DISORDERS OF ARTERIES, ARTERIOLES AND CAPILLARIES IN DISEASES CLASSIFIED ELSEWHERE (HCC): ICD-10-CM

## 2024-08-26 PROCEDURE — 95813 EEG EXTND MNTR 61-119 MIN: CPT

## 2024-08-26 PROCEDURE — 93880 EXTRACRANIAL BILAT STUDY: CPT

## 2024-08-26 NOTE — PROGRESS NOTES
EXTENDED EEG Completed with Ulisses Analysis.    PCP: Shakira Paetl MD    Ordering: Peter Kahn Neurologist    Interpreting Physician: Criss Molina Neurologmichael    Technician: Kerry Santo RN

## 2024-08-28 LAB
VAS LEFT BULB EDV: 16.3 CM/S
VAS LEFT BULB PSV: 74.5 CM/S
VAS LEFT CCA DIST EDV: 18.9 CM/S
VAS LEFT CCA DIST PSV: 72 CM/S
VAS LEFT CCA MID EDV: 15.01 CM/S
VAS LEFT CCA MID PSV: 64.19 CM/S
VAS LEFT CCA PROX EDV: 20.2 CM/S
VAS LEFT CCA PROX PSV: 68.1 CM/S
VAS LEFT ECA EDV: 6.6 CM/S
VAS LEFT ECA PSV: 46.5 CM/S
VAS LEFT ICA DIST EDV: 21.9 CM/S
VAS LEFT ICA DIST PSV: 59.4 CM/S
VAS LEFT ICA MID EDV: 19.3 CM/S
VAS LEFT ICA MID PSV: 60.7 CM/S
VAS LEFT ICA PROX EDV: 18 CM/S
VAS LEFT ICA PROX PSV: 51.6 CM/S
VAS LEFT ICA/CCA PSV: 1.16 NO UNITS
VAS LEFT VERTEBRAL EDV: 14.11 CM/S
VAS LEFT VERTEBRAL PSV: 42.6 CM/S
VAS RIGHT BULB EDV: 12.7 CM/S
VAS RIGHT BULB PSV: 49.4 CM/S
VAS RIGHT CCA DIST EDV: 11.9 CM/S
VAS RIGHT CCA DIST PSV: 58.1 CM/S
VAS RIGHT CCA MID EDV: 15.35 CM/S
VAS RIGHT CCA MID PSV: 65.08 CM/S
VAS RIGHT CCA PROX EDV: 13.6 CM/S
VAS RIGHT CCA PROX PSV: 57.2 CM/S
VAS RIGHT ECA EDV: 9.84 CM/S
VAS RIGHT ECA PSV: 86.2 CM/S
VAS RIGHT ICA DIST EDV: 30.5 CM/S
VAS RIGHT ICA DIST PSV: 75.8 CM/S
VAS RIGHT ICA MID EDV: 16.3 CM/S
VAS RIGHT ICA MID PSV: 50 CM/S
VAS RIGHT ICA PROX EDV: 13.7 CM/S
VAS RIGHT ICA PROX PSV: 42.2 CM/S
VAS RIGHT ICA/CCA PSV: 1.2 NO UNITS
VAS RIGHT VERTEBRAL EDV: 17.6 CM/S
VAS RIGHT VERTEBRAL PSV: 50 CM/S

## 2024-08-30 NOTE — PROCEDURES
[place] [address] [place] [address]                       ELECTROENCEPHALOGRAM REPORT      PATIENT NAME: ALEKSANDER TEJADA                : 1950  MED REC NO: [id number]                         ROOM:   ACCOUNT NO: [id number]                       ADMIT DATE: 2024  PROVIDER: Brent Alves MD      DATE OF EE2024    REFERRING PHYSICIAN:  BRENT ALVES    TECHNIQUE:  23 channels of EEG, 2 channels of EOG, 2 channel of EKG, and 1 channel ground and 1 channel reference were recorded with [employer] 32 channel system utilizing the International 10/20 system protocol.    Ulisses detection and seizure analysis protocols were utilized, and the study was reviewed using the comprehensive EEG monitoring.    This is an extended EEG recorded for 1 hour and 2 minutes.    CLINICAL DATA:  The patient is 74 years old with a history of memory loss, TIA, cerebral infarction, lacunar infarcts, dizziness, memory difficulties and problems.    The purpose of study is to evaluate for associated seizure activity.    BACKGROUND:  While the patient was awake, the background activity consisted of fairly-regulated low-amplitude 10 to 11 hertz waveforms seen over both cerebral hemispheres.    Intermittent brief frontal and eye movement artifacts noted.    ACTIVATION PROCEDURES:  HYPERVENTILATION:  Could not be performed due to the patient's clinical condition.    PHOTIC STIMULATION:  Photic stimulation was performed at the following frequencies: 1 Hz, 3 Hz, 6 Hz, 9 Hz, 12 Hz, 15 Hz, 18 Hz, 21 Hz, 25 Hz, 30 Hz and patient tolerated well.    Photic stimulation:  Very mild driving response noted.    SLEEP:  Stage 1 and 2.    EKG:  EKG showed normal sinus rhythm without any significant abnormality.    IMPRESSION:  No significant focal, lateralized, or epileptiform features noted during the current recording.    Further clinical correlation and

## 2024-09-11 ENCOUNTER — HOSPITAL ENCOUNTER (OUTPATIENT)
Dept: NEUROLOGY | Age: 74
Discharge: HOME | End: 2024-09-11
Payer: MEDICARE

## 2024-09-11 DIAGNOSIS — I79.8 OTHER DISORDERS OF ARTERIES, ARTERIOLES AND CAPILLARIES IN DISEASES CLASSIFIED ELSEWHERE (HCC): ICD-10-CM

## 2024-09-11 DIAGNOSIS — I63.9 CEREBROVASCULAR ACCIDENT (CVA), UNSPECIFIED MECHANISM (HCC): ICD-10-CM

## 2024-09-11 DIAGNOSIS — G93.89 BRAIN DYSFUNCTION: ICD-10-CM

## 2024-09-11 PROCEDURE — 93886 INTRACRANIAL COMPLETE STUDY: CPT

## 2024-09-11 PROCEDURE — 93892 TCD EMBOLI DETECT W/O INJ: CPT

## 2024-09-12 DIAGNOSIS — G25.81 RLS (RESTLESS LEGS SYNDROME): ICD-10-CM

## 2024-09-12 RX ORDER — ROPINIROLE 1 MG/1
TABLET, FILM COATED ORAL
Qty: 90 TABLET | Refills: 1 | Status: SHIPPED | OUTPATIENT
Start: 2024-09-12

## 2024-09-25 ENCOUNTER — OFFICE VISIT (OUTPATIENT)
Dept: NEUROLOGY | Age: 74
End: 2024-09-25
Payer: MEDICARE

## 2024-09-25 VITALS
BODY MASS INDEX: 35.33 KG/M2 | HEART RATE: 80 BPM | OXYGEN SATURATION: 97 % | WEIGHT: 187 LBS | DIASTOLIC BLOOD PRESSURE: 76 MMHG | RESPIRATION RATE: 16 BRPM | SYSTOLIC BLOOD PRESSURE: 116 MMHG

## 2024-09-25 DIAGNOSIS — I63.00 CEREBRAL INFARCTION DUE TO THROMBOSIS OF PRECEREBRAL ARTERY (HCC): ICD-10-CM

## 2024-09-25 DIAGNOSIS — Z86.73 H/O TIA (TRANSIENT ISCHEMIC ATTACK) AND STROKE: Primary | ICD-10-CM

## 2024-09-25 DIAGNOSIS — G25.81 RLS (RESTLESS LEGS SYNDROME): ICD-10-CM

## 2024-09-25 DIAGNOSIS — E03.9 ACQUIRED HYPOTHYROIDISM: ICD-10-CM

## 2024-09-25 DIAGNOSIS — I63.81 LACUNAR INFARCTION (HCC): ICD-10-CM

## 2024-09-25 DIAGNOSIS — G57.12 MERALGIA PARESTHETICA OF LEFT SIDE: ICD-10-CM

## 2024-09-25 DIAGNOSIS — G45.0 VBI (VERTEBROBASILAR INSUFFICIENCY): ICD-10-CM

## 2024-09-25 DIAGNOSIS — E66.01 SEVERE OBESITY (BMI 35.0-39.9) WITH COMORBIDITY: ICD-10-CM

## 2024-09-25 DIAGNOSIS — I63.9 CEREBROVASCULAR ACCIDENT (CVA), UNSPECIFIED MECHANISM (HCC): ICD-10-CM

## 2024-09-25 DIAGNOSIS — I69.90 LATE EFFECTS OF CVA (CEREBROVASCULAR ACCIDENT): ICD-10-CM

## 2024-09-25 DIAGNOSIS — R42 DIZZINESS: ICD-10-CM

## 2024-09-25 DIAGNOSIS — R41.3 MEMORY PROBLEM: ICD-10-CM

## 2024-09-25 PROCEDURE — 1036F TOBACCO NON-USER: CPT | Performed by: PSYCHIATRY & NEUROLOGY

## 2024-09-25 PROCEDURE — 99214 OFFICE O/P EST MOD 30 MIN: CPT | Performed by: PSYCHIATRY & NEUROLOGY

## 2024-09-25 PROCEDURE — G8399 PT W/DXA RESULTS DOCUMENT: HCPCS | Performed by: PSYCHIATRY & NEUROLOGY

## 2024-09-25 PROCEDURE — 1090F PRES/ABSN URINE INCON ASSESS: CPT | Performed by: PSYCHIATRY & NEUROLOGY

## 2024-09-25 PROCEDURE — G8417 CALC BMI ABV UP PARAM F/U: HCPCS | Performed by: PSYCHIATRY & NEUROLOGY

## 2024-09-25 PROCEDURE — G8427 DOCREV CUR MEDS BY ELIG CLIN: HCPCS | Performed by: PSYCHIATRY & NEUROLOGY

## 2024-09-25 PROCEDURE — 1123F ACP DISCUSS/DSCN MKR DOCD: CPT | Performed by: PSYCHIATRY & NEUROLOGY

## 2024-09-25 RX ORDER — ROPINIROLE 1 MG/1
TABLET, FILM COATED ORAL
Qty: 90 TABLET | Refills: 1 | Status: SHIPPED | OUTPATIENT
Start: 2024-09-25

## 2024-09-25 RX ORDER — CLOPIDOGREL BISULFATE 75 MG/1
TABLET ORAL
Qty: 90 TABLET | Refills: 1 | Status: SHIPPED | OUTPATIENT
Start: 2024-09-25

## 2024-09-25 ASSESSMENT — ENCOUNTER SYMPTOMS
ABDOMINAL PAIN: 0
VOMITING: 0
PHOTOPHOBIA: 0
BOWEL INCONTINENCE: 0
SORE THROAT: 0
CONSTIPATION: 0
CHEST TIGHTNESS: 0
FACIAL SWELLING: 0
COLOR CHANGE: 0
BLOOD IN STOOL: 0
CHANGE IN BOWEL HABIT: 0
NAUSEA: 0
EYE ITCHING: 0
SWOLLEN GLANDS: 0
CHOKING: 0
EYE REDNESS: 0
SHORTNESS OF BREATH: 0
ABDOMINAL DISTENTION: 0
WHEEZING: 0
SINUS PRESSURE: 0
VOICE CHANGE: 0
APNEA: 0
BACK PAIN: 0
DIARRHEA: 0
VISUAL CHANGE: 0
EYE DISCHARGE: 0
TROUBLE SWALLOWING: 0
EYE PAIN: 0
COUGH: 0

## 2024-09-25 ASSESSMENT — PATIENT HEALTH QUESTIONNAIRE - PHQ9
SUM OF ALL RESPONSES TO PHQ QUESTIONS 1-9: 0
2. FEELING DOWN, DEPRESSED OR HOPELESS: NOT AT ALL
SUM OF ALL RESPONSES TO PHQ QUESTIONS 1-9: 0
1. LITTLE INTEREST OR PLEASURE IN DOING THINGS: NOT AT ALL
SUM OF ALL RESPONSES TO PHQ QUESTIONS 1-9: 0
SUM OF ALL RESPONSES TO PHQ QUESTIONS 1-9: 0
SUM OF ALL RESPONSES TO PHQ9 QUESTIONS 1 & 2: 0

## 2024-09-30 RX ORDER — ROSUVASTATIN CALCIUM 10 MG/1
10 TABLET, COATED ORAL DAILY
Qty: 90 TABLET | Refills: 3 | Status: SHIPPED | OUTPATIENT
Start: 2024-09-30

## 2024-09-30 RX ORDER — EZETIMIBE 10 MG/1
10 TABLET ORAL DAILY
Qty: 90 TABLET | Refills: 3 | Status: SHIPPED | OUTPATIENT
Start: 2024-09-30

## 2024-10-01 RX ORDER — METOPROLOL TARTRATE 25 MG/1
12.5 TABLET, FILM COATED ORAL 2 TIMES DAILY
Qty: 90 TABLET | Refills: 0 | Status: SHIPPED | OUTPATIENT
Start: 2024-10-01

## 2024-10-08 ENCOUNTER — HOSPITAL ENCOUNTER (OUTPATIENT)
Age: 74
Discharge: HOME | End: 2024-10-08
Payer: MEDICARE

## 2024-10-08 DIAGNOSIS — E78.2 MIXED HYPERLIPIDEMIA: ICD-10-CM

## 2024-10-08 DIAGNOSIS — R73.01 ELEVATED FASTING GLUCOSE: ICD-10-CM

## 2024-10-08 DIAGNOSIS — E03.9 ACQUIRED HYPOTHYROIDISM: ICD-10-CM

## 2024-10-08 LAB
ALBUMIN SERPL-MCNC: 3.9 G/DL (ref 3.5–5.2)
ALBUMIN/GLOB SERPL: 1.3 {RATIO} (ref 1–2.5)
ALP SERPL-CCNC: 80 U/L (ref 35–104)
ALT SERPL-CCNC: 11 U/L (ref 5–33)
ANION GAP SERPL CALCULATED.3IONS-SCNC: 6 MMOL/L (ref 9–17)
AST SERPL-CCNC: 18 U/L
BILIRUB SERPL-MCNC: 1.2 MG/DL (ref 0.3–1.2)
BUN SERPL-MCNC: 14 MG/DL (ref 8–23)
BUN/CREAT SERPL: 23 (ref 9–20)
CALCIUM SERPL-MCNC: 9.5 MG/DL (ref 8.6–10.4)
CHLORIDE SERPL-SCNC: 106 MMOL/L (ref 98–107)
CHOLEST SERPL-MCNC: 131 MG/DL (ref 0–199)
CHOLESTEROL/HDL RATIO: 3
CO2 SERPL-SCNC: 28 MMOL/L (ref 20–31)
CREAT SERPL-MCNC: 0.6 MG/DL (ref 0.5–0.9)
EST. AVERAGE GLUCOSE BLD GHB EST-MCNC: 126 MG/DL
GFR, ESTIMATED: >90 ML/MIN/1.73M2
GLUCOSE SERPL-MCNC: 110 MG/DL (ref 70–99)
HBA1C MFR BLD: 6 % (ref 4–6)
HDLC SERPL-MCNC: 52 MG/DL
LDLC SERPL CALC-MCNC: 54 MG/DL (ref 0–100)
POTASSIUM SERPL-SCNC: 4 MMOL/L (ref 3.7–5.3)
PROT SERPL-MCNC: 7 G/DL (ref 6.4–8.3)
SODIUM SERPL-SCNC: 140 MMOL/L (ref 135–144)
T4 FREE SERPL-MCNC: 0.8 NG/DL (ref 0.92–1.68)
TRIGL SERPL-MCNC: 123 MG/DL
TSH SERPL DL<=0.05 MIU/L-ACNC: 9.01 UIU/ML (ref 0.3–5)
VLDLC SERPL CALC-MCNC: 25 MG/DL

## 2024-10-08 PROCEDURE — 80061 LIPID PANEL: CPT

## 2024-10-08 PROCEDURE — 80053 COMPREHEN METABOLIC PANEL: CPT

## 2024-10-08 PROCEDURE — 84439 ASSAY OF FREE THYROXINE: CPT

## 2024-10-08 PROCEDURE — 84443 ASSAY THYROID STIM HORMONE: CPT

## 2024-10-08 PROCEDURE — 36415 COLL VENOUS BLD VENIPUNCTURE: CPT

## 2024-10-08 PROCEDURE — 83036 HEMOGLOBIN GLYCOSYLATED A1C: CPT

## 2024-10-15 ENCOUNTER — OFFICE VISIT (OUTPATIENT)
Dept: FAMILY MEDICINE CLINIC | Age: 74
End: 2024-10-15

## 2024-10-15 VITALS
OXYGEN SATURATION: 97 % | SYSTOLIC BLOOD PRESSURE: 122 MMHG | DIASTOLIC BLOOD PRESSURE: 76 MMHG | BODY MASS INDEX: 36.44 KG/M2 | HEIGHT: 61 IN | HEART RATE: 84 BPM | WEIGHT: 193 LBS

## 2024-10-15 DIAGNOSIS — Z00.00 MEDICARE ANNUAL WELLNESS VISIT, SUBSEQUENT: Primary | ICD-10-CM

## 2024-10-15 DIAGNOSIS — E03.9 ACQUIRED HYPOTHYROIDISM: ICD-10-CM

## 2024-10-15 DIAGNOSIS — E78.2 MIXED HYPERLIPIDEMIA: ICD-10-CM

## 2024-10-15 DIAGNOSIS — R05.9 COUGH, UNSPECIFIED TYPE: ICD-10-CM

## 2024-10-15 DIAGNOSIS — Z86.73 HISTORY OF STROKE: ICD-10-CM

## 2024-10-15 DIAGNOSIS — I10 ESSENTIAL HYPERTENSION: ICD-10-CM

## 2024-10-15 DIAGNOSIS — R73.01 ELEVATED FASTING GLUCOSE: ICD-10-CM

## 2024-10-15 RX ORDER — LEVOTHYROXINE SODIUM 150 UG/1
150 TABLET ORAL DAILY
Qty: 30 TABLET | Refills: 1 | Status: SHIPPED | OUTPATIENT
Start: 2024-10-15

## 2024-10-15 RX ORDER — FLUTICASONE PROPIONATE 220 UG/1
2 AEROSOL, METERED RESPIRATORY (INHALATION) 2 TIMES DAILY
Qty: 1 EACH | Refills: 1 | Status: SHIPPED | OUTPATIENT
Start: 2024-10-15 | End: 2024-11-14

## 2024-10-15 RX ORDER — METOPROLOL TARTRATE 25 MG/1
12.5 TABLET, FILM COATED ORAL 2 TIMES DAILY
Qty: 90 TABLET | Refills: 1 | Status: SHIPPED | OUTPATIENT
Start: 2024-10-15

## 2024-10-15 SDOH — ECONOMIC STABILITY: FOOD INSECURITY: WITHIN THE PAST 12 MONTHS, YOU WORRIED THAT YOUR FOOD WOULD RUN OUT BEFORE YOU GOT MONEY TO BUY MORE.: PATIENT DECLINED

## 2024-10-15 SDOH — ECONOMIC STABILITY: FOOD INSECURITY: WITHIN THE PAST 12 MONTHS, THE FOOD YOU BOUGHT JUST DIDN'T LAST AND YOU DIDN'T HAVE MONEY TO GET MORE.: PATIENT DECLINED

## 2024-10-15 SDOH — ECONOMIC STABILITY: INCOME INSECURITY: HOW HARD IS IT FOR YOU TO PAY FOR THE VERY BASICS LIKE FOOD, HOUSING, MEDICAL CARE, AND HEATING?: PATIENT DECLINED

## 2024-10-15 ASSESSMENT — PATIENT HEALTH QUESTIONNAIRE - PHQ9
SUM OF ALL RESPONSES TO PHQ QUESTIONS 1-9: 0
SUM OF ALL RESPONSES TO PHQ QUESTIONS 1-9: 0
1. LITTLE INTEREST OR PLEASURE IN DOING THINGS: NOT AT ALL
SUM OF ALL RESPONSES TO PHQ QUESTIONS 1-9: 0
SUM OF ALL RESPONSES TO PHQ9 QUESTIONS 1 & 2: 0
SUM OF ALL RESPONSES TO PHQ QUESTIONS 1-9: 0
2. FEELING DOWN, DEPRESSED OR HOPELESS: NOT AT ALL

## 2024-10-15 NOTE — PROGRESS NOTES
Medicare Annual Wellness Visit    Ольга Lewis is here for Hyperlipidemia (6 month follow up), Hypertension (6 month follow up), and elevated fasting glucose (6 month follow up)    Assessment & Plan   Elevated fasting glucose  Mixed hyperlipidemia  Acquired hypothyroidism  The following orders have not been finalized:  -     levothyroxine (SYNTHROID) 137 MCG tablet    Recommendations for Preventive Services Due: see orders and patient instructions/AVS.  Recommended screening schedule for the next 5-10 years is provided to the patient in written form: see Patient Instructions/AVS.     No follow-ups on file.     Subjective       Patient's complete Health Risk Assessment and screening values have been reviewed and are found in Flowsheets. The following problems were reviewed today and where indicated follow up appointments were made and/or referrals ordered.    Positive Risk Factor Screenings with Interventions:                  Abnormal BMI (obese):  Body mass index is 36.47 kg/m². (!) Abnormal    Interventions:  See AVS for additional education material                           Objective   Vitals:    10/15/24 1613   BP: 122/76   Site: Right Upper Arm   Position: Sitting   Cuff Size: Large Adult   Pulse: 84   SpO2: 97%   Weight: 87.5 kg (193 lb)   Height: 1.549 m (5' 1\")      Body mass index is 36.47 kg/m².                    Allergies   Allergen Reactions    Sulfamethoxazole-Trimethoprim Swelling     lips    Penicillins Rash     Prior to Visit Medications    Medication Sig Taking? Authorizing Provider   metoprolol tartrate (LOPRESSOR) 25 MG tablet Take 0.5 tablets by mouth 2 times daily Yes Shakira Patel MD   rosuvastatin (CRESTOR) 10 MG tablet Take 1 tablet by mouth daily Yes Gianna Zapata APRN - CNP   clopidogrel (PLAVIX) 75 MG tablet take 1 tablet by mouth once daily Yes Peter Kahn MD   rOPINIRole (REQUIP) 1 MG tablet take 1 tablet by mouth at bedtime Yes Peter Kahn MD   albuterol 
Patient declines vaccines today  
without a race factor using the 2021 CKD-EPI equation.  Careful clinical correlation is recommended, particularly when comparing to results   calculated using previous equations.  The CKD-EPI equation is less accurate in patients with extremes of muscle mass, extra-renal   metabolism of creatine, excessive creatine ingestion, or following therapy that affects   renal tubular secretion.      BUN/Creatinine Ratio 10/08/2024 23 (H)  9 - 20 Final    Calcium 10/08/2024 9.5  8.6 - 10.4 mg/dL Final    Total Protein 10/08/2024 7.0  6.4 - 8.3 g/dL Final    Albumin 10/08/2024 3.9  3.5 - 5.2 g/dL Final    Albumin/Globulin Ratio 10/08/2024 1.3  1.0 - 2.5 Final    Total Bilirubin 10/08/2024 1.2  0.3 - 1.2 mg/dL Final    Alkaline Phosphatase 10/08/2024 80  35 - 104 U/L Final    ALT 10/08/2024 11  5 - 33 U/L Final    AST 10/08/2024 18  <32 U/L Final    T4 Free 10/08/2024 0.8 (L)  0.92 - 1.68 ng/dL Final    TSH 10/08/2024 9.01 (H)  0.30 - 5.00 uIU/mL Final       I reviewed the results of the chest x-ray done 8/23/2024 was reviewed with the patient.      8/23/2024 4:39 pm     COMPARISON:  03/16/2020     HISTORY:  ORDERING SYSTEM PROVIDED HISTORY: Wheezing  TECHNOLOGIST PROVIDED HISTORY:  wheezing, cough x 1 week     FINDINGS:  The lungs are without acute focal process.  There is no effusion or  pneumothorax. The cardiomediastinal silhouette is stable. The osseous  structures are stable.     IMPRESSION:  No acute process.    Results           The patient (or guardian, if applicable) and other individuals in attendance with the patient were advised that Artificial Intelligence will be utilized during this visit to record, process the conversation to generate a clinical note, and support improvement of the AI technology. The patient (or guardian, if applicable) and other individuals in attendance at the appointment consented to the use of AI, including the recording.                          (Please note that portions of this note were

## 2024-11-01 DIAGNOSIS — Z12.31 SCREENING MAMMOGRAM FOR BREAST CANCER: Primary | ICD-10-CM

## 2024-11-04 ENCOUNTER — TELEPHONE (OUTPATIENT)
Dept: PRIMARY CARE CLINIC | Age: 74
End: 2024-11-04

## 2024-11-04 DIAGNOSIS — Z12.31 VISIT FOR SCREENING MAMMOGRAM: Primary | ICD-10-CM

## 2024-11-26 ENCOUNTER — HOSPITAL ENCOUNTER (OUTPATIENT)
Age: 74
Discharge: HOME | End: 2024-11-26
Payer: MEDICARE

## 2024-11-26 DIAGNOSIS — E03.9 ACQUIRED HYPOTHYROIDISM: ICD-10-CM

## 2024-11-26 LAB
T4 FREE SERPL-MCNC: 1.7 NG/DL (ref 0.92–1.68)
TSH SERPL DL<=0.05 MIU/L-ACNC: 1.55 UIU/ML (ref 0.3–5)

## 2024-11-26 PROCEDURE — 84439 ASSAY OF FREE THYROXINE: CPT

## 2024-11-26 PROCEDURE — 84443 ASSAY THYROID STIM HORMONE: CPT

## 2024-11-26 PROCEDURE — 36415 COLL VENOUS BLD VENIPUNCTURE: CPT

## 2024-11-30 ENCOUNTER — HOSPITAL ENCOUNTER (OUTPATIENT)
Dept: MAMMOGRAPHY | Age: 74
Discharge: HOME | End: 2024-12-02
Payer: MEDICARE

## 2024-11-30 VITALS — WEIGHT: 190 LBS | HEIGHT: 61 IN | BODY MASS INDEX: 35.87 KG/M2

## 2024-11-30 DIAGNOSIS — Z12.31 SCREENING MAMMOGRAM FOR BREAST CANCER: ICD-10-CM

## 2024-11-30 PROCEDURE — 77063 BREAST TOMOSYNTHESIS BI: CPT

## 2024-12-06 DIAGNOSIS — E03.9 ACQUIRED HYPOTHYROIDISM: Primary | ICD-10-CM

## 2024-12-06 DIAGNOSIS — E03.9 ACQUIRED HYPOTHYROIDISM: ICD-10-CM

## 2024-12-06 RX ORDER — LEVOTHYROXINE SODIUM 150 UG/1
150 TABLET ORAL DAILY
Qty: 90 TABLET | Refills: 1 | Status: SHIPPED | OUTPATIENT
Start: 2024-12-06

## 2024-12-06 NOTE — TELEPHONE ENCOUNTER
Ольга called requesting a refill of the below medication which has been pended for you:     Requested Prescriptions     Pending Prescriptions Disp Refills    levothyroxine (SYNTHROID) 150 MCG tablet 90 tablet 1     Sig: Take 1 tablet by mouth daily       Last Appointment Date: 10/15/2024  Next Appointment Date: 4/15/2025    Allergies   Allergen Reactions    Sulfamethoxazole-Trimethoprim Swelling     lips    Penicillins Rash

## 2025-01-08 RX ORDER — EZETIMIBE 10 MG/1
10 TABLET ORAL DAILY
Qty: 90 TABLET | Refills: 3 | Status: SHIPPED | OUTPATIENT
Start: 2025-01-08

## 2025-02-04 ENCOUNTER — OFFICE VISIT (OUTPATIENT)
Dept: CARDIOLOGY | Age: 75
End: 2025-02-04
Payer: MEDICARE

## 2025-02-04 VITALS
DIASTOLIC BLOOD PRESSURE: 70 MMHG | SYSTOLIC BLOOD PRESSURE: 120 MMHG | OXYGEN SATURATION: 97 % | HEART RATE: 71 BPM | HEIGHT: 61 IN | WEIGHT: 190 LBS | BODY MASS INDEX: 35.87 KG/M2

## 2025-02-04 DIAGNOSIS — E78.00 PURE HYPERCHOLESTEROLEMIA: ICD-10-CM

## 2025-02-04 DIAGNOSIS — R94.31 ABNORMAL ECG: Primary | ICD-10-CM

## 2025-02-04 DIAGNOSIS — E66.01 CLASS 2 SEVERE OBESITY DUE TO EXCESS CALORIES WITH SERIOUS COMORBIDITY IN ADULT, UNSPECIFIED BMI: ICD-10-CM

## 2025-02-04 DIAGNOSIS — I10 HYPERTENSION, ESSENTIAL: ICD-10-CM

## 2025-02-04 DIAGNOSIS — I25.10 CORONARY ARTERY DISEASE INVOLVING NATIVE CORONARY ARTERY OF NATIVE HEART WITHOUT ANGINA PECTORIS: ICD-10-CM

## 2025-02-04 DIAGNOSIS — I63.89 CEREBROVASCULAR ACCIDENT (CVA) DUE TO OTHER MECHANISM (HCC): ICD-10-CM

## 2025-02-04 DIAGNOSIS — E66.812 CLASS 2 SEVERE OBESITY DUE TO EXCESS CALORIES WITH SERIOUS COMORBIDITY IN ADULT, UNSPECIFIED BMI: ICD-10-CM

## 2025-02-04 PROCEDURE — 93005 ELECTROCARDIOGRAM TRACING: CPT | Performed by: INTERNAL MEDICINE

## 2025-02-04 PROCEDURE — 1036F TOBACCO NON-USER: CPT | Performed by: INTERNAL MEDICINE

## 2025-02-04 PROCEDURE — 3078F DIAST BP <80 MM HG: CPT | Performed by: INTERNAL MEDICINE

## 2025-02-04 PROCEDURE — G8427 DOCREV CUR MEDS BY ELIG CLIN: HCPCS | Performed by: INTERNAL MEDICINE

## 2025-02-04 PROCEDURE — 99213 OFFICE O/P EST LOW 20 MIN: CPT | Performed by: INTERNAL MEDICINE

## 2025-02-04 PROCEDURE — 99214 OFFICE O/P EST MOD 30 MIN: CPT | Performed by: INTERNAL MEDICINE

## 2025-02-04 PROCEDURE — G8417 CALC BMI ABV UP PARAM F/U: HCPCS | Performed by: INTERNAL MEDICINE

## 2025-02-04 PROCEDURE — 3074F SYST BP LT 130 MM HG: CPT | Performed by: INTERNAL MEDICINE

## 2025-02-04 PROCEDURE — 93010 ELECTROCARDIOGRAM REPORT: CPT | Performed by: INTERNAL MEDICINE

## 2025-02-04 PROCEDURE — 1123F ACP DISCUSS/DSCN MKR DOCD: CPT | Performed by: INTERNAL MEDICINE

## 2025-02-04 PROCEDURE — 1159F MED LIST DOCD IN RCRD: CPT | Performed by: INTERNAL MEDICINE

## 2025-02-04 PROCEDURE — G8399 PT W/DXA RESULTS DOCUMENT: HCPCS | Performed by: INTERNAL MEDICINE

## 2025-02-04 PROCEDURE — 1090F PRES/ABSN URINE INCON ASSESS: CPT | Performed by: INTERNAL MEDICINE

## 2025-02-04 RX ORDER — DOXYCYCLINE 100 MG/1
CAPSULE ORAL
COMMUNITY
Start: 2024-11-12 | End: 2025-02-04

## 2025-02-04 NOTE — PROGRESS NOTES
Today's Date: 2/4/2025  Patient's Name: Ольга Lewis  Patient's age: 74 y.o., 1950    CC: HTN, DL    HPI:  The patient is a 74 y.o. year old, , female is in the office for F/U.  No chest pains, no sob, no palpitations, no le edema, no orthopnea, no PND.   Does lots of walking everyday walks to [place].   Usually 30 minutes a day.   Does aerobics 3x/week.     Past Medical History:   has a past medical history of Acute confusion, CVA (cerebral infarction), Hyperlipidemia, Hypothyroidism, Lacunar infarction (HCC), Memory loss due to medical condition, Meralgia paresthetica, Myopia with astigmatism and presbyopia, Perennial allergic rhinitis, RLS (restless legs syndrome), Status post biopsy of skin, TIA (transient ischemic attack), and VBI (vertebrobasilar insufficiency).    Past Surgical History:   has a past surgical history that includes pre-malignant / benign skin lesion excision; [place] tooth extraction (1981); Cardiac catheterization (9/2014); Colonoscopy (02/25/2015); Insertable Cardiac Monitor (4/22/16); Intracapsular cataract extraction (Right, 9/29/2020); Intracapsular cataract extraction (Left, 10/27/2020); and Cataract removal (Bilateral, 2020).    Home Medications:  Prior to Admission medications    Medication Sig Start Date End Date Taking? Authorizing Provider   ezetimibe (ZETIA) 10 MG tablet Take 1 tablet by mouth daily 1/8/25  Yes Gianna Zapata APRN - CNP   levothyroxine (SYNTHROID) 150 MCG tablet Take 1 tablet by mouth daily 12/6/24  Yes Shakira Patel MD   metoprolol tartrate (LOPRESSOR) 25 MG tablet Take 0.5 tablets by mouth 2 times daily 10/15/24  Yes Shakira Patel MD   rosuvastatin (CRESTOR) 10 MG tablet Take 1 tablet by mouth daily 9/30/24  Yes Gianna Zapata APRN - CNP   clopidogrel (PLAVIX) 75 MG tablet take 1 tablet by mouth once daily 9/25/24  Yes Peter Kahn MD   rOPINIRole (REQUIP) 1 MG tablet take 1 tablet by mouth at bedtime 9/25/24  Yes

## 2025-03-04 DIAGNOSIS — G25.81 RLS (RESTLESS LEGS SYNDROME): ICD-10-CM

## 2025-03-04 RX ORDER — ROPINIROLE 1 MG/1
TABLET, FILM COATED ORAL
Qty: 90 TABLET | Refills: 1 | Status: SHIPPED | OUTPATIENT
Start: 2025-03-04

## 2025-03-04 NOTE — TELEPHONE ENCOUNTER
Last Appt:  9/25/2024  Next Appt:   3/26/2025  Med verified in [facility]     Patient needs a refill on ropinirole sent to [facility], [place]

## 2025-03-23 DIAGNOSIS — I63.9 CEREBROVASCULAR ACCIDENT (CVA), UNSPECIFIED MECHANISM (HCC): ICD-10-CM

## 2025-03-25 RX ORDER — CLOPIDOGREL BISULFATE 75 MG/1
75 TABLET ORAL DAILY
Qty: 90 TABLET | Refills: 0 | Status: SHIPPED | OUTPATIENT
Start: 2025-03-25 | End: 2025-03-26 | Stop reason: SDUPTHER

## 2025-03-25 NOTE — TELEPHONE ENCOUNTER
Last Appt:  9/25/2024  Next Appt:   3/26/2025  Med verified in Epic     Patient is requesting a refill of plavix

## 2025-03-26 ENCOUNTER — OFFICE VISIT (OUTPATIENT)
Dept: NEUROLOGY | Age: 75
End: 2025-03-26
Payer: MEDICARE

## 2025-03-26 VITALS
BODY MASS INDEX: 35.52 KG/M2 | OXYGEN SATURATION: 95 % | WEIGHT: 188 LBS | SYSTOLIC BLOOD PRESSURE: 116 MMHG | DIASTOLIC BLOOD PRESSURE: 68 MMHG | HEART RATE: 90 BPM | RESPIRATION RATE: 16 BRPM

## 2025-03-26 DIAGNOSIS — G57.12 MERALGIA PARESTHETICA OF LEFT SIDE: ICD-10-CM

## 2025-03-26 DIAGNOSIS — I69.90 LATE EFFECTS OF CVA (CEREBROVASCULAR ACCIDENT): ICD-10-CM

## 2025-03-26 DIAGNOSIS — G25.81 RLS (RESTLESS LEGS SYNDROME): ICD-10-CM

## 2025-03-26 DIAGNOSIS — G45.0 VBI (VERTEBROBASILAR INSUFFICIENCY): ICD-10-CM

## 2025-03-26 DIAGNOSIS — E03.9 ACQUIRED HYPOTHYROIDISM: ICD-10-CM

## 2025-03-26 DIAGNOSIS — R42 DIZZINESS: ICD-10-CM

## 2025-03-26 DIAGNOSIS — I63.81 LACUNAR INFARCTION (HCC): ICD-10-CM

## 2025-03-26 DIAGNOSIS — E66.01 SEVERE OBESITY (BMI 35.0-39.9) WITH COMORBIDITY: ICD-10-CM

## 2025-03-26 DIAGNOSIS — R41.3 MEMORY PROBLEM: ICD-10-CM

## 2025-03-26 DIAGNOSIS — I63.00 CEREBRAL INFARCTION DUE TO THROMBOSIS OF PRECEREBRAL ARTERY (HCC): ICD-10-CM

## 2025-03-26 DIAGNOSIS — Z86.73 H/O TIA (TRANSIENT ISCHEMIC ATTACK) AND STROKE: Primary | ICD-10-CM

## 2025-03-26 PROCEDURE — 99214 OFFICE O/P EST MOD 30 MIN: CPT | Performed by: PSYCHIATRY & NEUROLOGY

## 2025-03-26 PROCEDURE — G8417 CALC BMI ABV UP PARAM F/U: HCPCS | Performed by: PSYCHIATRY & NEUROLOGY

## 2025-03-26 PROCEDURE — 1090F PRES/ABSN URINE INCON ASSESS: CPT | Performed by: PSYCHIATRY & NEUROLOGY

## 2025-03-26 PROCEDURE — 1159F MED LIST DOCD IN RCRD: CPT | Performed by: PSYCHIATRY & NEUROLOGY

## 2025-03-26 PROCEDURE — G8399 PT W/DXA RESULTS DOCUMENT: HCPCS | Performed by: PSYCHIATRY & NEUROLOGY

## 2025-03-26 PROCEDURE — 1123F ACP DISCUSS/DSCN MKR DOCD: CPT | Performed by: PSYCHIATRY & NEUROLOGY

## 2025-03-26 PROCEDURE — 1160F RVW MEDS BY RX/DR IN RCRD: CPT | Performed by: PSYCHIATRY & NEUROLOGY

## 2025-03-26 PROCEDURE — 1036F TOBACCO NON-USER: CPT | Performed by: PSYCHIATRY & NEUROLOGY

## 2025-03-26 PROCEDURE — 1125F AMNT PAIN NOTED PAIN PRSNT: CPT | Performed by: PSYCHIATRY & NEUROLOGY

## 2025-03-26 PROCEDURE — G8427 DOCREV CUR MEDS BY ELIG CLIN: HCPCS | Performed by: PSYCHIATRY & NEUROLOGY

## 2025-03-26 RX ORDER — CLOPIDOGREL BISULFATE 75 MG/1
75 TABLET ORAL DAILY
Qty: 90 TABLET | Refills: 1 | Status: SHIPPED | OUTPATIENT
Start: 2025-03-26

## 2025-03-26 RX ORDER — ROPINIROLE 1 MG/1
TABLET, FILM COATED ORAL
Qty: 90 TABLET | Refills: 1 | Status: SHIPPED | OUTPATIENT
Start: 2025-03-26

## 2025-03-26 ASSESSMENT — PATIENT HEALTH QUESTIONNAIRE - PHQ9
1. LITTLE INTEREST OR PLEASURE IN DOING THINGS: NOT AT ALL
2. FEELING DOWN, DEPRESSED OR HOPELESS: NOT AT ALL
SUM OF ALL RESPONSES TO PHQ QUESTIONS 1-9: 0

## 2025-03-26 ASSESSMENT — ENCOUNTER SYMPTOMS
EYE ITCHING: 0
SHORTNESS OF BREATH: 0
CHOKING: 0
DIARRHEA: 0
CHEST TIGHTNESS: 0
CONSTIPATION: 0
COLOR CHANGE: 0
BOWEL INCONTINENCE: 0
VOICE CHANGE: 0
BLOOD IN STOOL: 0
ABDOMINAL DISTENTION: 0
EYE DISCHARGE: 0
TROUBLE SWALLOWING: 0
WHEEZING: 0
APNEA: 0
EYE REDNESS: 0
EYE PAIN: 0
FACIAL SWELLING: 0
PHOTOPHOBIA: 0
SINUS PRESSURE: 0

## 2025-03-26 NOTE — PROGRESS NOTES
daily as needed for Wheezing 18 g 0    fluticasone (FLONASE) 50 MCG/ACT nasal spray instill 2 sprays into each nostril once daily 16 g 0    nystatin (MYCOSTATIN) 370681 UNIT/GM powder Apply 3 times daily. 1 each 2    clotrimazole-betamethasone (LOTRISONE) 1-0.05 % cream Apply topically 2 times daily prn. 30 g 2    calcium carbonate (OSCAL) 500 MG TABS tablet Take 1 tablet by mouth daily      aspirin 81 MG tablet Take 1 tablet by mouth daily      Vitamin D (CHOLECALCIFEROL) 1000 UNITS CAPS capsule Take 3 capsules by mouth daily      Multiple Vitamin (MULTI-VITAMINS PO) Take 1 tablet by mouth Daily.      Ascorbic Acid (VITAMIN C) 1000 MG tablet Take 1 tablet by mouth daily (Patient not taking: Reported on 3/26/2025)       No current facility-administered medications for this visit.             Allergies   Allergen Reactions    Sulfamethoxazole-Trimethoprim Swelling     lips    Penicillins Rash               Family History   Problem Relation Age of Onset    Stroke Mother     Other Mother          from pneumonia    Liver Disease Father         due to alcohol    Heart Disease Father         valve disease    Diabetes Father     Thyroid Disease Sister     Cancer Sister         breast cancer older sister    Breast Cancer Sister 50    High Cholesterol Sister     Diabetes Brother     Diabetes Brother     Breast Cancer Maternal Grandmother 69    Other Paternal Aunt         late onset alzheimers    No Known Problems Maternal Aunt     Glaucoma Neg Hx     Cataracts Neg Hx              Social History     Socioeconomic History    Marital status:      Spouse name: Not on file    Number of children: Not on file    Years of education: Not on file    Highest education level: Not on file   Occupational History    Occupation: retired    Tobacco Use    Smoking status: Never    Smokeless tobacco: Never   Vaping Use    Vaping status: Never Used   Substance and Sexual Activity    Alcohol use: No    Drug use: No    Sexual activity:

## 2025-04-15 ENCOUNTER — CLINICAL SUPPORT (OUTPATIENT)
Dept: LAB | Age: 75
End: 2025-04-15
Payer: MEDICARE

## 2025-04-15 ENCOUNTER — OFFICE VISIT (OUTPATIENT)
Dept: FAMILY MEDICINE CLINIC | Age: 75
End: 2025-04-15
Payer: MEDICARE

## 2025-04-15 VITALS
BODY MASS INDEX: 35.87 KG/M2 | HEIGHT: 61 IN | SYSTOLIC BLOOD PRESSURE: 114 MMHG | HEART RATE: 80 BPM | WEIGHT: 190 LBS | OXYGEN SATURATION: 97 % | TEMPERATURE: 98.1 F | DIASTOLIC BLOOD PRESSURE: 78 MMHG

## 2025-04-15 DIAGNOSIS — Z23 NEED FOR VACCINATION: Primary | ICD-10-CM

## 2025-04-15 DIAGNOSIS — E78.2 MIXED HYPERLIPIDEMIA: ICD-10-CM

## 2025-04-15 DIAGNOSIS — Z23 NEED FOR COVID-19 VACCINE: ICD-10-CM

## 2025-04-15 DIAGNOSIS — Z13.820 ENCOUNTER FOR OSTEOPOROSIS SCREENING IN ASYMPTOMATIC POSTMENOPAUSAL PATIENT: ICD-10-CM

## 2025-04-15 DIAGNOSIS — E66.9 OBESITY (BMI 30-39.9): ICD-10-CM

## 2025-04-15 DIAGNOSIS — I10 ESSENTIAL HYPERTENSION: Primary | ICD-10-CM

## 2025-04-15 DIAGNOSIS — R73.01 ELEVATED FASTING GLUCOSE: ICD-10-CM

## 2025-04-15 DIAGNOSIS — Z78.0 ENCOUNTER FOR OSTEOPOROSIS SCREENING IN ASYMPTOMATIC POSTMENOPAUSAL PATIENT: ICD-10-CM

## 2025-04-15 DIAGNOSIS — E03.9 ACQUIRED HYPOTHYROIDISM: ICD-10-CM

## 2025-04-15 DIAGNOSIS — E67.8 OTHER SPECIFIED HYPERALIMENTATION: ICD-10-CM

## 2025-04-15 PROBLEM — E11.9 CONTROLLED TYPE 2 DIABETES MELLITUS WITHOUT COMPLICATION, UNSPECIFIED WHETHER LONG TERM INSULIN USE: Status: ACTIVE | Noted: 2025-04-15

## 2025-04-15 PROCEDURE — 90471 IMMUNIZATION ADMIN: CPT | Performed by: FAMILY MEDICINE

## 2025-04-15 PROCEDURE — 99214 OFFICE O/P EST MOD 30 MIN: CPT | Performed by: FAMILY MEDICINE

## 2025-04-15 PROCEDURE — PBSHW COVID-19, COMIRNATY, (AGE 12Y+), IM, PF, 30MCG/0.3ML: Performed by: FAMILY MEDICINE

## 2025-04-15 PROCEDURE — 3078F DIAST BP <80 MM HG: CPT | Performed by: FAMILY MEDICINE

## 2025-04-15 PROCEDURE — G2211 COMPLEX E/M VISIT ADD ON: HCPCS | Performed by: FAMILY MEDICINE

## 2025-04-15 PROCEDURE — 1036F TOBACCO NON-USER: CPT | Performed by: FAMILY MEDICINE

## 2025-04-15 PROCEDURE — G8399 PT W/DXA RESULTS DOCUMENT: HCPCS | Performed by: FAMILY MEDICINE

## 2025-04-15 PROCEDURE — G8417 CALC BMI ABV UP PARAM F/U: HCPCS | Performed by: FAMILY MEDICINE

## 2025-04-15 PROCEDURE — 1159F MED LIST DOCD IN RCRD: CPT | Performed by: FAMILY MEDICINE

## 2025-04-15 PROCEDURE — 3074F SYST BP LT 130 MM HG: CPT | Performed by: FAMILY MEDICINE

## 2025-04-15 PROCEDURE — G8427 DOCREV CUR MEDS BY ELIG CLIN: HCPCS | Performed by: FAMILY MEDICINE

## 2025-04-15 PROCEDURE — 1160F RVW MEDS BY RX/DR IN RCRD: CPT | Performed by: FAMILY MEDICINE

## 2025-04-15 PROCEDURE — 1123F ACP DISCUSS/DSCN MKR DOCD: CPT | Performed by: FAMILY MEDICINE

## 2025-04-15 PROCEDURE — 1090F PRES/ABSN URINE INCON ASSESS: CPT | Performed by: FAMILY MEDICINE

## 2025-04-15 RX ORDER — METOPROLOL TARTRATE 25 MG/1
12.5 TABLET, FILM COATED ORAL 2 TIMES DAILY
Qty: 90 TABLET | Refills: 1 | Status: SHIPPED | OUTPATIENT
Start: 2025-04-15

## 2025-04-15 SDOH — ECONOMIC STABILITY: FOOD INSECURITY: WITHIN THE PAST 12 MONTHS, YOU WORRIED THAT YOUR FOOD WOULD RUN OUT BEFORE YOU GOT MONEY TO BUY MORE.: PATIENT DECLINED

## 2025-04-15 SDOH — ECONOMIC STABILITY: FOOD INSECURITY: WITHIN THE PAST 12 MONTHS, THE FOOD YOU BOUGHT JUST DIDN'T LAST AND YOU DIDN'T HAVE MONEY TO GET MORE.: PATIENT DECLINED

## 2025-04-15 NOTE — PROGRESS NOTES
Patent will get fasting labs this week. Patient states she does not need refills at this time  
problem list:  Patient Active Problem List   Diagnosis    Hypothyroidism    Hyperlipidemia    Cerebral infarction    Memory loss due to medical condition    TIA (transient ischemic attack)    Meralgia paresthetica    Cataract    Myopia of both eyes with astigmatism and presbyopia    RLS (restless legs syndrome)    Myopia of both eyes    Lacunar infarction (HCC)    CVA (cerebral vascular accident) (HCC)    VBI (vertebrobasilar insufficiency)    JOHNATHAN due to [place]    Mixed hyperlipidemia    Cerebrovascular accident (CVA) due to thrombosis of precerebral artery (HCC)    Cerebral infarction due to thrombosis of vertebral artery    Late effects of CVA (cerebrovascular accident)    Other hyperlipidemia    Dizziness    Memory problem    H/O TIA (transient ischemic attack) and stroke    Severe obesity (BMI 35.0-39.9) with comorbidity    History of stroke        Current medications are:  Outpatient Medications Marked as Taking for the 4/15/25 encounter (Office Visit) with Shakira Patel MD   Medication Sig Dispense Refill    rOPINIRole (REQUIP) 1 MG tablet take 1 tablet by mouth at bedtime 90 tablet 1    clopidogrel (PLAVIX) 75 MG tablet Take 1 tablet by mouth daily 90 tablet 1    ezetimibe (ZETIA) 10 MG tablet Take 1 tablet by mouth daily 90 tablet 3    levothyroxine (SYNTHROID) 150 MCG tablet Take 1 tablet by mouth daily 90 tablet 1    metoprolol tartrate (LOPRESSOR) 25 MG tablet Take 0.5 tablets by mouth 2 times daily 90 tablet 1    rosuvastatin (CRESTOR) 10 MG tablet Take 1 tablet by mouth daily 90 tablet 3    albuterol sulfate HFA (VENTOLIN HFA) 108 (90 Base) MCG/ACT inhaler Inhale 2 puffs into the lungs 4 times daily as needed for Wheezing 18 g 0    fluticasone (FLONASE) 50 MCG/ACT nasal spray instill 2 sprays into each nostril once daily (Patient taking differently: daily as needed instill 2 sprays into each nostril once daily) 16 g 0    nystatin (MYCOSTATIN) 002235 UNIT/GM powder Apply 3 times daily. 1 each 2

## 2025-04-17 ENCOUNTER — HOSPITAL ENCOUNTER (OUTPATIENT)
Age: 75
Discharge: HOME | End: 2025-04-17
Payer: MEDICARE

## 2025-04-17 ENCOUNTER — HOSPITAL ENCOUNTER (OUTPATIENT)
Dept: BONE DENSITY | Age: 75
Discharge: HOME | End: 2025-04-19
Attending: FAMILY MEDICINE
Payer: MEDICARE

## 2025-04-17 DIAGNOSIS — Z78.0 ENCOUNTER FOR OSTEOPOROSIS SCREENING IN ASYMPTOMATIC POSTMENOPAUSAL PATIENT: ICD-10-CM

## 2025-04-17 DIAGNOSIS — E66.9 OBESITY (BMI 30-39.9): ICD-10-CM

## 2025-04-17 DIAGNOSIS — E67.8 OTHER SPECIFIED HYPERALIMENTATION: ICD-10-CM

## 2025-04-17 DIAGNOSIS — E78.2 MIXED HYPERLIPIDEMIA: ICD-10-CM

## 2025-04-17 DIAGNOSIS — R73.01 ELEVATED FASTING GLUCOSE: ICD-10-CM

## 2025-04-17 DIAGNOSIS — E03.9 ACQUIRED HYPOTHYROIDISM: ICD-10-CM

## 2025-04-17 DIAGNOSIS — Z13.820 ENCOUNTER FOR OSTEOPOROSIS SCREENING IN ASYMPTOMATIC POSTMENOPAUSAL PATIENT: ICD-10-CM

## 2025-04-17 LAB
25(OH)D3 SERPL-MCNC: 47.8 NG/ML (ref 30–100)
ALBUMIN SERPL-MCNC: 4.1 G/DL (ref 3.5–5.2)
ALBUMIN/GLOB SERPL: 1.4 {RATIO} (ref 1–2.5)
ALP SERPL-CCNC: 88 U/L (ref 35–104)
ALT SERPL-CCNC: 14 U/L (ref 10–35)
ANION GAP SERPL CALCULATED.3IONS-SCNC: 11 MMOL/L (ref 9–16)
AST SERPL-CCNC: 19 U/L (ref 10–35)
BILIRUB SERPL-MCNC: 1.2 MG/DL (ref 0–1.2)
BUN SERPL-MCNC: 17 MG/DL (ref 8–23)
BUN/CREAT SERPL: 24 (ref 9–20)
CALCIUM SERPL-MCNC: 9.5 MG/DL (ref 8.6–10.4)
CHLORIDE SERPL-SCNC: 106 MMOL/L (ref 98–107)
CHOLEST SERPL-MCNC: 122 MG/DL (ref 0–199)
CHOLESTEROL/HDL RATIO: 2.5
CO2 SERPL-SCNC: 25 MMOL/L (ref 20–31)
CREAT SERPL-MCNC: 0.7 MG/DL (ref 0.6–0.9)
EST. AVERAGE GLUCOSE BLD GHB EST-MCNC: 126 MG/DL
GFR, ESTIMATED: 90 ML/MIN/1.73M2
GLUCOSE SERPL-MCNC: 115 MG/DL (ref 74–99)
HBA1C MFR BLD: 6 % (ref 4–6)
HDLC SERPL-MCNC: 49 MG/DL
LDLC SERPL CALC-MCNC: 54 MG/DL (ref 0–100)
POTASSIUM SERPL-SCNC: 4.2 MMOL/L (ref 3.7–5.3)
PROT SERPL-MCNC: 7 G/DL (ref 6.6–8.7)
SODIUM SERPL-SCNC: 142 MMOL/L (ref 136–145)
T4 FREE SERPL-MCNC: 1.7 NG/DL (ref 0.92–1.68)
TRIGL SERPL-MCNC: 94 MG/DL
TSH SERPL DL<=0.05 MIU/L-ACNC: 0.27 UIU/ML (ref 0.27–4.2)
VLDLC SERPL CALC-MCNC: 19 MG/DL (ref 1–30)

## 2025-04-17 PROCEDURE — 80061 LIPID PANEL: CPT

## 2025-04-17 PROCEDURE — 77085 DXA BONE DENSITY AXL VRT FX: CPT

## 2025-04-17 PROCEDURE — 84439 ASSAY OF FREE THYROXINE: CPT

## 2025-04-17 PROCEDURE — 83036 HEMOGLOBIN GLYCOSYLATED A1C: CPT

## 2025-04-17 PROCEDURE — 80053 COMPREHEN METABOLIC PANEL: CPT

## 2025-04-17 PROCEDURE — 77080 DXA BONE DENSITY AXIAL: CPT

## 2025-04-17 PROCEDURE — 36415 COLL VENOUS BLD VENIPUNCTURE: CPT

## 2025-04-17 PROCEDURE — 82306 VITAMIN D 25 HYDROXY: CPT

## 2025-04-17 PROCEDURE — 84443 ASSAY THYROID STIM HORMONE: CPT

## 2025-04-26 ENCOUNTER — RESULTS FOLLOW-UP (OUTPATIENT)
Dept: PRIMARY CARE CLINIC | Age: 75
End: 2025-04-26

## 2025-04-27 ENCOUNTER — RESULTS FOLLOW-UP (OUTPATIENT)
Dept: PRIMARY CARE CLINIC | Age: 75
End: 2025-04-27

## 2025-04-29 ENCOUNTER — TELEPHONE (OUTPATIENT)
Dept: FAMILY MEDICINE CLINIC | Age: 75
End: 2025-04-29

## 2025-06-05 DIAGNOSIS — E03.9 ACQUIRED HYPOTHYROIDISM: ICD-10-CM

## 2025-06-05 RX ORDER — LEVOTHYROXINE SODIUM 150 UG/1
150 TABLET ORAL DAILY
Qty: 90 TABLET | Refills: 1 | Status: SHIPPED | OUTPATIENT
Start: 2025-06-05

## 2025-06-05 NOTE — TELEPHONE ENCOUNTER
Ольга called requesting a refill of the below medication which has been pended for you:     Requested Prescriptions     Pending Prescriptions Disp Refills    levothyroxine (SYNTHROID) 150 MCG tablet [Pharmacy Med Name: Levothyroxine Sodium 150 MCG Oral Tablet] 90 tablet 0     Sig: Take 1 tablet by mouth once daily       Last Appointment Date: 4/15/2025  Next Appointment Date: 10/21/2025    Allergies   Allergen Reactions    Sulfamethoxazole-Trimethoprim Swelling     lips    Penicillins Rash

## 2025-08-18 ENCOUNTER — OFFICE VISIT (OUTPATIENT)
Dept: CARDIOLOGY | Age: 75
End: 2025-08-18
Payer: MEDICARE

## 2025-08-18 VITALS
BODY MASS INDEX: 36.82 KG/M2 | WEIGHT: 195 LBS | HEIGHT: 61 IN | DIASTOLIC BLOOD PRESSURE: 78 MMHG | OXYGEN SATURATION: 96 % | SYSTOLIC BLOOD PRESSURE: 124 MMHG | HEART RATE: 81 BPM

## 2025-08-18 DIAGNOSIS — I25.10 CORONARY ARTERY DISEASE INVOLVING NATIVE CORONARY ARTERY OF NATIVE HEART WITHOUT ANGINA PECTORIS: Primary | ICD-10-CM

## 2025-08-18 PROCEDURE — 1090F PRES/ABSN URINE INCON ASSESS: CPT | Performed by: SURGERY

## 2025-08-18 PROCEDURE — 99214 OFFICE O/P EST MOD 30 MIN: CPT | Performed by: SURGERY

## 2025-08-18 PROCEDURE — 93005 ELECTROCARDIOGRAM TRACING: CPT | Performed by: SURGERY

## 2025-08-18 PROCEDURE — 1123F ACP DISCUSS/DSCN MKR DOCD: CPT | Performed by: SURGERY

## 2025-08-18 PROCEDURE — G8417 CALC BMI ABV UP PARAM F/U: HCPCS | Performed by: SURGERY

## 2025-08-18 PROCEDURE — G8428 CUR MEDS NOT DOCUMENT: HCPCS | Performed by: SURGERY

## 2025-08-18 PROCEDURE — 93010 ELECTROCARDIOGRAM REPORT: CPT | Performed by: SURGERY

## 2025-08-18 PROCEDURE — 1036F TOBACCO NON-USER: CPT | Performed by: SURGERY

## 2025-08-18 PROCEDURE — 99213 OFFICE O/P EST LOW 20 MIN: CPT | Performed by: SURGERY

## 2025-08-18 PROCEDURE — 1126F AMNT PAIN NOTED NONE PRSNT: CPT | Performed by: SURGERY

## 2025-08-18 PROCEDURE — G8399 PT W/DXA RESULTS DOCUMENT: HCPCS | Performed by: SURGERY

## 2025-08-18 RX ORDER — EZETIMIBE 10 MG/1
10 TABLET ORAL DAILY
Qty: 90 TABLET | Refills: 3 | Status: SHIPPED | OUTPATIENT
Start: 2025-08-18

## 2025-08-18 RX ORDER — ROSUVASTATIN CALCIUM 10 MG/1
10 TABLET, COATED ORAL DAILY
Qty: 90 TABLET | Refills: 3 | Status: SHIPPED | OUTPATIENT
Start: 2025-08-18

## (undated) DEVICE — GLOVE ORANGE PI 7 1/2   MSG9075

## (undated) DEVICE — AGENT VISCOELASTIC 085ML NONPYROGENIC SOD HYALURONATE

## (undated) DEVICE — SINGLE USE MEDICAL DEVICE FOR OPHTHALMIC SURGERY: Brand: SIL. COATED I/A 45 MIL 12/B

## (undated) DEVICE — GLOVE SURG SZ 65 THK91MIL LTX FREE SYN POLYISOPRENE

## (undated) DEVICE — SOLUTION IRRIGATION BAL SALT SOLUTION 15 ML STRL BSS

## (undated) DEVICE — KNIFE OPHTHLMC 17.6X2.03X1.09MM 11MM ANGLD SDPRT LSRDGE ARRO

## (undated) DEVICE — CANNULA HYDRDISECT CHANG 90 DEG 27 GAX16 MM STRL DISP

## (undated) DEVICE — SURGICAL PROCEDURE PACK EYE

## (undated) DEVICE — GLOVE ORANGE PI 7   MSG9070

## (undated) DEVICE — SOLUTION IRRIGATION BAL SALT SOLUTION 500 ML BTL 6/CA BSS +

## (undated) DEVICE — KNIFE OPHTH SLT 45 DEG 2.4 MM ANGLED BVL UP SS NS XSTAR DISP

## (undated) DEVICE — 0.9 MM 45° KELMAN® MINI-FLARED ABS® TIP: Brand: ALCON, INFINITI, INTREPID

## (undated) DEVICE — VISCOAT OPTH SOLN 0.5ML VISCOELASTIC